# Patient Record
Sex: MALE | Race: WHITE
[De-identification: names, ages, dates, MRNs, and addresses within clinical notes are randomized per-mention and may not be internally consistent; named-entity substitution may affect disease eponyms.]

---

## 2017-05-09 ENCOUNTER — HOSPITAL ENCOUNTER (OUTPATIENT)
Dept: HOSPITAL 57 - HPCALD | Age: 77
Discharge: HOME | End: 2017-05-09
Attending: FAMILY MEDICINE
Payer: MEDICARE

## 2017-05-09 DIAGNOSIS — R31.0: Primary | ICD-10-CM

## 2017-05-09 LAB
ANION GAP SERPL CALC-SCNC: 12 MMOL/L (ref 10–20)
BASOPHILS # BLD AUTO: 0.1 THOU/UL (ref 0–0.2)
BASOPHILS NFR BLD AUTO: 0.7 % (ref 0–1)
BUN SERPL-MCNC: 20 MG/DL (ref 8.4–25.7)
CALCIUM SERPL-MCNC: 8.8 MG/DL (ref 7.8–10.44)
CHLORIDE SERPL-SCNC: 110 MMOL/L (ref 98–107)
CO2 SERPL-SCNC: 26 MMOL/L (ref 23–31)
CREAT CL PREDICTED SERPL C-G-VRATE: 0 ML/MIN (ref 70–130)
EOSINOPHIL # BLD AUTO: 0 THOU/UL (ref 0–0.7)
EOSINOPHIL NFR BLD AUTO: 0.5 % (ref 0–10)
GLUCOSE SERPL-MCNC: 92 MG/DL (ref 83–110)
HGB BLD-MCNC: 15.6 G/DL (ref 14–18)
LYMPHOCYTES # BLD AUTO: 1.5 THOU/UL (ref 1.2–3.4)
LYMPHOCYTES NFR BLD AUTO: 20 % (ref 21–51)
MCH RBC QN AUTO: 30.4 PG (ref 27–31)
MCV RBC AUTO: 94.7 FL (ref 80–94)
MONOCYTES # BLD AUTO: 0.7 THOU/UL (ref 0.11–0.59)
MONOCYTES NFR BLD AUTO: 9.3 % (ref 0–10)
NEUTROPHILS # BLD AUTO: 5.2 THOU/UL (ref 1.4–6.5)
NEUTROPHILS NFR BLD AUTO: 69.4 % (ref 42–75)
PLATELET # BLD AUTO: 194 THOU/UL (ref 130–400)
POTASSIUM SERPL-SCNC: 4.4 MMOL/L (ref 3.5–5.1)
RBC # BLD AUTO: 5.16 MILL/UL (ref 4.7–6.1)
SODIUM SERPL-SCNC: 144 MMOL/L (ref 136–145)
WBC # BLD AUTO: 7.5 THOU/UL (ref 4.8–10.8)

## 2017-05-09 PROCEDURE — 80048 BASIC METABOLIC PNL TOTAL CA: CPT

## 2017-05-09 PROCEDURE — 85025 COMPLETE CBC W/AUTO DIFF WBC: CPT

## 2017-05-09 PROCEDURE — 36415 COLL VENOUS BLD VENIPUNCTURE: CPT

## 2017-05-09 PROCEDURE — 87086 URINE CULTURE/COLONY COUNT: CPT

## 2017-05-14 ENCOUNTER — HOSPITAL ENCOUNTER (EMERGENCY)
Dept: HOSPITAL 57 - BURERS | Age: 77
LOS: 1 days | Discharge: HOME | End: 2017-05-15
Payer: MEDICARE

## 2017-05-14 DIAGNOSIS — N13.2: Primary | ICD-10-CM

## 2017-05-14 DIAGNOSIS — Z79.82: ICD-10-CM

## 2017-05-14 DIAGNOSIS — Z79.899: ICD-10-CM

## 2017-05-14 DIAGNOSIS — I10: ICD-10-CM

## 2017-05-14 LAB
BACTERIA UR QL AUTO: (no result) HPF
RBC UR QL AUTO: (no result) HPF (ref 0–3)
SP GR UR STRIP: 1.02 (ref 1–1.03)
WBC UR QL AUTO: (no result) HPF (ref 0–3)

## 2017-05-14 PROCEDURE — 74176 CT ABD & PELVIS W/O CONTRAST: CPT

## 2017-05-14 PROCEDURE — 96372 THER/PROPH/DIAG INJ SC/IM: CPT

## 2017-05-14 PROCEDURE — 87086 URINE CULTURE/COLONY COUNT: CPT

## 2017-05-14 PROCEDURE — 81003 URINALYSIS AUTO W/O SCOPE: CPT

## 2017-05-14 PROCEDURE — 81015 MICROSCOPIC EXAM OF URINE: CPT

## 2017-05-15 NOTE — CT
PRELIMINARY REPORT/[facility]/EMERGENCY AFTER

HOURS PROCEDURE:

 

EXAM:

CT Abdomen and Pelvis Without Intravenous Contrast

 

CLINICAL HISTORY:

76 years old, male; Pain; Abdominal pain; Flank; Right lower quadrant (rlq); Patient HX: Pt having s
harp right flank pain started today.

 

TECHNIQUE:

Axial computed tomography images of the abdomen and pelvis without intravenous contrast. This CT exa
m was performed using one or more of the following dose reduction techniques: automated exposure con
trol, adjustment of the mA and/or kV according to patient size, and/or use of iterative

reconstruction technique.

Coronal reformatted images were created and reviewed.

 

EXAM DATE/TIME:

5/14/2017 10:34 PM

 

COMPARISON:

No relevant prior studies available.

 

FINDINGS:

Limitations: Masses and lesions in the solid organs including traumatic injury and vascular and infe
ctious pathology can be missed without intravenous contrast.

Lower thorax: Right lower lobe 9 mm pulmonary nodule. Small hiatal hernia

 

ABDOMEN:

Liver: unremarkable for age

Gallbladder and bile ducts: No gallbladder distention. No CBD dilation.

Pancreas: unremarkable

Spleen: unremarkable

Adrenals: unremarkable

Kidneys and ureters: Right nephrolithiasis. Mild right hydronephrosis from right UVJ 2 mm stone.

Left renal cortical and parapelvic cysts.

Stomach and bowel: No obstruction.

Appendix: Not seen

 

PELVIS:

Bladder: Urinary bladder decompressed

Reproductive: Slightly enlarged prostate with hypertrophy of the lobe of Tom.

 

ABDOMEN and PELVIS:

Intraperitoneal space: No ascites. No pneumoperitoneum.

Bones/joints: Multiple osteosclerotic foci

Soft tissues: Fat enters umbilicus.

Vasculature: No abdominal aortic aneurysm.

Lymph nodes: Retroperitoneal and pelvic lymphadenopathy most pronounced left iliac chain

 

IMPRESSION:

- Right nephrolithiasis. Mild right hydronephrosis from right UVJ 2 mm stone.

- Adenopathy and osteosclerosis worrisome for metastatic disease.

- Right lower lobe 9 mm pulmonary nodule.

 

Thank you for allowing us to participate in the care of your patient.

 

Dictated and Authenticated by: Alexander Gomez MD

05/14/2017 11:03 PM Central Time ([place] \T\ [place])

 

 

FINAL REPORT

CT ABDOMEN AND PELVIS WITHOUT CONTRAST:

 

Date:  05/14/17 

 

Comparison made with the 09/23/16 CT of the chest. 

 

As noted previously, there is a small nodule in the right lower lobe. It seems slightly larger today
 and measures about 10.0 mm in size. The fluid collection seen previously along the right posterolat
eral hemithorax has resolved. There are no effusions. 

 

Right hydronephrosis is present secondary to a small distal right ureteral calculus measuring about 
5.0 mm in size near the right UVJ. There is at least one tiny calculus remaining in the right kidney
. The left kidney shows some cysts and parapelvic cysts. Without IV contrast, it would be difficult 
to see any solid masses. 

 

The liver, spleen, pancreas, and gallbladder show no acute findings. A tiny right adrenal mass seen 
on the 2016 study is difficult to assess well in this noncontrast study, but the left adrenal seems 
to be slightly enlarged. The aorta is normal in caliber. 

 

Of considerable concern on this study is abundance of paraaortic and pericaval adenopathy that jazmyn
nues on into the iliac chains bilaterally. In addition, there are several lumbar vertebra that have 
sclerotic areas within it that are suspicious for metastatic disease. There is considerable degenera
tive change throughout the spine otherwise. 

 

CT of the pelvis was remarkable for some of the iliac chain adenopathy, some nodes of which are jaden
uring up to 3.0 cm in size. Otherwise, no pelvic masses were seen. The patient's prostate seems rath
er generous in size and not entirely uniform. It would be worth being checked. 

 

IMPRESSION: 

1.  5.0 mm distal right ureteral calculus causing mild right hydronephrosis. 

 

2.  Tiny, nonobstructing calculus in the right kidney. Multiple cystic areas seen in the left kidney
. 

 

3.  Abundance of paraaortic and pericaval adenopathy, as well as iliac chain adenopathy. This, taken
 in conjunction with sclerotic areas seen in the lumbar spine, raise a significant question of metas
tatic disease. The prostate, in particular, would be worth checking. 

 

4.  10.0 mm right lower lobe nodule which seems slightly larger than on the 2016 CT of the chest. 

 

Findings in agreement with preliminary reading by Trip. 

 

 

POS: HOME

## 2018-02-25 ENCOUNTER — HOSPITAL ENCOUNTER (EMERGENCY)
Dept: HOSPITAL 57 - BURERS | Age: 78
Discharge: HOME | End: 2018-02-25
Payer: MEDICARE

## 2018-02-25 DIAGNOSIS — Z79.82: ICD-10-CM

## 2018-02-25 DIAGNOSIS — Z79.899: ICD-10-CM

## 2018-02-25 DIAGNOSIS — D49.2: ICD-10-CM

## 2018-02-25 DIAGNOSIS — G89.29: ICD-10-CM

## 2018-02-25 DIAGNOSIS — C41.4: ICD-10-CM

## 2018-02-25 DIAGNOSIS — M54.5: Primary | ICD-10-CM

## 2018-02-25 DIAGNOSIS — I10: ICD-10-CM

## 2018-02-25 LAB
BACTERIA UR QL AUTO: (no result) HPF
HYALINE CASTS #/AREA URNS LPF: (no result) LPF
RBC UR QL AUTO: (no result) HPF (ref 0–3)
SP GR UR STRIP: 1.02 (ref 1–1.03)
WBC UR QL AUTO: (no result) HPF (ref 0–3)

## 2018-02-25 PROCEDURE — 81015 MICROSCOPIC EXAM OF URINE: CPT

## 2018-02-25 PROCEDURE — 74176 CT ABD & PELVIS W/O CONTRAST: CPT

## 2018-02-25 PROCEDURE — 81003 URINALYSIS AUTO W/O SCOPE: CPT

## 2018-02-25 NOTE — CT
CT ABDOMEN AND PELVIS WITHOUT CONTRAST:

 

Date:  02/25/18 

 

Comparison made with the 05/14/17 study. 

 

Axial slices were acquired, then coronal and sagittal reconstructions were done. No oral or IV contra
st was used. 

 

FINDINGS:

No infiltrates or effusions seen in the lung bases. There are several small pulmonary nodules seen bi
laterally in all lobes. The largest is in the right lower lobe which is 7.0 mm in size. It is actuall
y a little smaller than before. 

 

The liver, spleen, pancreas, adrenal glands, and aorta show no acute findings. A hiatal hernia is pre
sent. 

 

There is no obstruction on the right today or renal calculi. There is moderate left hydronephrosis an
d hydroureter all the way down to the urinary bladder. Inside the bladder, there appears to be a mass
 near the left UVJ internally that may be originating from the prostate. In addition to these finding
s, there is extensive paraaortic and pericaval adenopathy, as well as iliac adenopathy bilaterally. T
hese findings were present before, but have advanced somewhat. The other change between now and then 
is presence of many more sclerotic areas in the spine and the pelvis. Findings suggest diffuse metast
atic disease, probably of a prostatic origin given the sclerotic nature of the findings. There is als
o moderately severe spinal stenosis at L4-L5 due to facet and ligamentous hypertrophy and a diffuse b
ulging disc. 

 

The bowel is nondistended and shows no inflammatory change around it. No free air or free fluid seen.
 A minimal fat-filled umbilical hernia was present of no concern. There is a little bit of adenopathy
 in the mesentery. 

 

CT of the pelvis was remarkable for the findings already listed above. There were no inflammatory jacob
nges or fluid. The prostate is enlarged and irregular. 

 

IMPRESSION: 

1.  Left hydronephrosis and hydroureter that appears to be due to a mass within the urinary bladder, 
probably of prostatic origin. No evidence of right-sided obstruction. 

 

2.  Multiple sclerotic areas in the spine and pelvis consistent with metastatic disease. These have i
ncreased in size and number since 2017. 

 

3.  Extensive paraaortic and pericaval adenopathy, in addition to iliac adenopathy. Roughly similar t
o before, but probably slightly more nevertheless. 

 

4.  Spinal stenosis at L4-L5. 

 

Findings discussed with Dr. Augustin at 1942 hours on 02/25/18. 

CODE CR. 

 

 

POS: HOME

## 2018-04-07 ENCOUNTER — HOSPITAL ENCOUNTER (EMERGENCY)
Dept: HOSPITAL 92 - ERS | Age: 78
Discharge: HOME | End: 2018-04-07
Payer: MEDICARE

## 2018-04-07 PROCEDURE — 96374 THER/PROPH/DIAG INJ IV PUSH: CPT

## 2018-04-07 PROCEDURE — 96375 TX/PRO/DX INJ NEW DRUG ADDON: CPT

## 2018-04-07 PROCEDURE — 93970 EXTREMITY STUDY: CPT

## 2018-04-07 NOTE — RAD
RADIOGRAPH LEFT LEG TIBIA AND FIBULA 2 VIEWS:

 

Date:  04/07/18 

 

HISTORY:  

77-year-old male with bilateral leg pain for 1 month. 

 

FINDINGS:

Severe DJD at medial compartment of left knee. Moderate DJD at patellofemoral compartment and lateral
 compartment. Diffuse osteopenia. No periosteal elevation, osteolytic lesion, osteoblastic lesion, or
 permeative lesion, or fracture involving the tibia and fibula. 

 

IMPRESSION: 

1.  Severe osteoarthrosis of the left knee. 

2.  Osteopenia. 

3.  No destructive osseous lesion. 

 

 

POS: [facility]

## 2018-04-07 NOTE — RAD
RADIOGRAPH RIGHT LEG TIBIA AND FIBULA 2 VIEWS:

 

Date:  04/07/18 

 

HISTORY:  

77-year-old male with nontraumatic bilateral leg pain for 1 month. 

 

FINDINGS:

Diffuse osteopenia. No periosteal elevation, permeative lesion, osteoblastic lesion, or osteolytic le
herber. No fracture. DJD at knee, with moderate to severe degenerative changes at the medial compartmen
t and patellofemoral compartment. 

 

IMPRESSION: 

1.  Osteopenia. 

2.  No fracture or destructive osseous lesion. 

3.  Osteoarthrosis of the knee, moderate to severe. 

 

 

POS: [facility]

## 2018-04-07 NOTE — ULT
ULTRASOUND WITH DOPPLER DUPLEX VENOUS LOWER EXTREMITIES BILATERAL:

 

HISTORY: 

77-year-old male with bilateral lower extremity pain. 

 

TECHNIQUE:

Color flow Doppler, spectral waveform analysis of pulsed Doppler, and gray-scale imaging with ronn
herber and augmentation, were used to evaluate the bilateral common femoral, femoral, popliteal, poster
ior tibial, and superficial femoral, veins; and the proximal portions of the profunda femoral and gre
ater saphenous, veins.

 

FINDINGS:

There is normal compressibility, demonstration of blood flow by color Doppler and pulsed Doppler, and
 response to augmentation, in all interrogated veins. Sluggish flow noted by the sonographer during s
levon. 

 

IMPRESSION:

1.  No deep vein thrombosis in the bilateral lower extremities.

2.  Sluggish flow throughout the deep veins of bilateral lower extremities. 

 

 

vonda[] 

 

POS: [facility]

## 2018-07-16 ENCOUNTER — HOSPITAL ENCOUNTER (EMERGENCY)
Dept: HOSPITAL 57 - BURERS | Age: 78
LOS: 1 days | Discharge: ANOTHER HOSPITAL | End: 2018-07-17
Payer: MEDICARE

## 2018-07-16 DIAGNOSIS — Z79.82: ICD-10-CM

## 2018-07-16 DIAGNOSIS — E78.5: ICD-10-CM

## 2018-07-16 DIAGNOSIS — I10: ICD-10-CM

## 2018-07-16 DIAGNOSIS — Z79.899: ICD-10-CM

## 2018-07-16 DIAGNOSIS — G95.20: Primary | ICD-10-CM

## 2018-07-16 LAB
ALBUMIN SERPL BCG-MCNC: 3.7 G/DL (ref 3.4–4.8)
ALP SERPL-CCNC: 286 U/L (ref 40–150)
ALT SERPL W P-5'-P-CCNC: 8 U/L (ref 8–55)
ANION GAP SERPL CALC-SCNC: 19 MMOL/L (ref 10–20)
ANISOCYTOSIS BLD QL SMEAR: (no result) (100X)
APAP SERPL-MCNC: (no result) MCG/ML (ref 10–30)
AST SERPL-CCNC: 11 U/L (ref 5–34)
BILIRUB SERPL-MCNC: 0.4 MG/DL (ref 0.2–1.2)
BUN SERPL-MCNC: 33 MG/DL (ref 8.4–25.7)
CALCIUM SERPL-MCNC: 8.9 MG/DL (ref 7.8–10.44)
CHLORIDE SERPL-SCNC: 108 MMOL/L (ref 98–107)
CK MB SERPL-MCNC: 1.1 NG/ML (ref 0–6.6)
CK SERPL-CCNC: 47 U/L (ref 30–200)
CO2 SERPL-SCNC: 19 MMOL/L (ref 23–31)
CREAT CL PREDICTED SERPL C-G-VRATE: 0 ML/MIN (ref 70–130)
GLOBULIN SER CALC-MCNC: 3.5 G/DL (ref 2.4–3.5)
GLUCOSE SERPL-MCNC: 159 MG/DL (ref 83–110)
HGB BLD-MCNC: 13.5 G/DL (ref 14–18)
LIPASE SERPL-CCNC: 26 U/L (ref 8–78)
MCH RBC QN AUTO: 30 PG (ref 27–31)
MCV RBC AUTO: 85.9 FL (ref 78–98)
MDIFF COMPLETE?: YES
PLATELET # BLD AUTO: 280 THOU/UL (ref 130–400)
PLATELET BLD QL SMEAR: (no result)
POTASSIUM SERPL-SCNC: 4.6 MMOL/L (ref 3.5–5.1)
RBC # BLD AUTO: 4.51 MILL/UL (ref 4.7–6.1)
SALICYLATES SERPL-MCNC: (no result) MG/DL (ref 15–30)
SODIUM SERPL-SCNC: 141 MMOL/L (ref 136–145)
TROPONIN I SERPL DL<=0.01 NG/ML-MCNC: 0.02 NG/ML (ref ?–0.03)
WBC # BLD AUTO: 10.5 THOU/UL (ref 4.8–10.8)

## 2018-07-16 PROCEDURE — 96361 HYDRATE IV INFUSION ADD-ON: CPT

## 2018-07-16 PROCEDURE — 84484 ASSAY OF TROPONIN QUANT: CPT

## 2018-07-16 PROCEDURE — 82553 CREATINE MB FRACTION: CPT

## 2018-07-16 PROCEDURE — 96374 THER/PROPH/DIAG INJ IV PUSH: CPT

## 2018-07-16 PROCEDURE — 71275 CT ANGIOGRAPHY CHEST: CPT

## 2018-07-16 PROCEDURE — 83605 ASSAY OF LACTIC ACID: CPT

## 2018-07-16 PROCEDURE — 93005 ELECTROCARDIOGRAM TRACING: CPT

## 2018-07-16 PROCEDURE — 83690 ASSAY OF LIPASE: CPT

## 2018-07-16 PROCEDURE — 82550 ASSAY OF CK (CPK): CPT

## 2018-07-16 PROCEDURE — 85379 FIBRIN DEGRADATION QUANT: CPT

## 2018-07-16 PROCEDURE — 80307 DRUG TEST PRSMV CHEM ANLYZR: CPT

## 2018-07-16 PROCEDURE — 85025 COMPLETE CBC W/AUTO DIFF WBC: CPT

## 2018-07-16 PROCEDURE — 96375 TX/PRO/DX INJ NEW DRUG ADDON: CPT

## 2018-07-16 PROCEDURE — 80053 COMPREHEN METABOLIC PANEL: CPT

## 2018-07-16 PROCEDURE — 96376 TX/PRO/DX INJ SAME DRUG ADON: CPT

## 2018-07-17 ENCOUNTER — HOSPITAL ENCOUNTER (OUTPATIENT)
Dept: HOSPITAL 92 - ERS | Age: 78
Setting detail: OBSERVATION
LOS: 3 days | Discharge: HOME | End: 2018-07-20
Attending: INTERNAL MEDICINE | Admitting: INTERNAL MEDICINE
Payer: MEDICARE

## 2018-07-17 VITALS — BODY MASS INDEX: 30.1 KG/M2

## 2018-07-17 DIAGNOSIS — N13.1: ICD-10-CM

## 2018-07-17 DIAGNOSIS — N17.9: Primary | ICD-10-CM

## 2018-07-17 DIAGNOSIS — Z91.19: ICD-10-CM

## 2018-07-17 DIAGNOSIS — R41.0: ICD-10-CM

## 2018-07-17 DIAGNOSIS — C61: ICD-10-CM

## 2018-07-17 DIAGNOSIS — R53.83: ICD-10-CM

## 2018-07-17 DIAGNOSIS — Z91.14: ICD-10-CM

## 2018-07-17 DIAGNOSIS — Z79.899: ICD-10-CM

## 2018-07-17 DIAGNOSIS — N32.89: ICD-10-CM

## 2018-07-17 DIAGNOSIS — G62.9: ICD-10-CM

## 2018-07-17 DIAGNOSIS — R33.8: ICD-10-CM

## 2018-07-17 DIAGNOSIS — E87.2: ICD-10-CM

## 2018-07-17 DIAGNOSIS — E78.5: ICD-10-CM

## 2018-07-17 DIAGNOSIS — T43.015A: ICD-10-CM

## 2018-07-17 DIAGNOSIS — Z87.442: ICD-10-CM

## 2018-07-17 DIAGNOSIS — C79.89: ICD-10-CM

## 2018-07-17 DIAGNOSIS — I10: ICD-10-CM

## 2018-07-17 DIAGNOSIS — M48.061: ICD-10-CM

## 2018-07-17 DIAGNOSIS — Z79.82: ICD-10-CM

## 2018-07-17 LAB
ALBUMIN SERPL BCG-MCNC: 3.7 G/DL (ref 3.4–4.8)
ALP SERPL-CCNC: 294 U/L (ref 40–150)
ALT SERPL W P-5'-P-CCNC: 9 U/L (ref 8–55)
ANION GAP SERPL CALC-SCNC: 14 MMOL/L (ref 10–20)
AST SERPL-CCNC: 15 U/L (ref 5–34)
BASOPHILS # BLD AUTO: 0 THOU/UL (ref 0–0.2)
BASOPHILS NFR BLD AUTO: 0 % (ref 0–1)
BILIRUB SERPL-MCNC: 0.3 MG/DL (ref 0.2–1.2)
BUN SERPL-MCNC: 30 MG/DL (ref 8.4–25.7)
CALCIUM SERPL-MCNC: 8.7 MG/DL (ref 7.8–10.44)
CHLORIDE SERPL-SCNC: 110 MMOL/L (ref 98–107)
CO2 SERPL-SCNC: 19 MMOL/L (ref 23–31)
CREAT CL PREDICTED SERPL C-G-VRATE: 0 ML/MIN (ref 70–130)
CRYSTAL-AUWI FLAG: 0 (ref 0–15)
CRYSTAL-AUWI FLAG: 0 (ref 0–15)
EOSINOPHIL # BLD AUTO: 0 THOU/UL (ref 0–0.7)
EOSINOPHIL NFR BLD AUTO: 0.4 % (ref 0–10)
GLOBULIN SER CALC-MCNC: 3 G/DL (ref 2.4–3.5)
GLUCOSE SERPL-MCNC: 184 MG/DL (ref 83–110)
HEV IGM SER QL: 2.4 (ref 0–7.99)
HEV IGM SER QL: 4.5 (ref 0–7.99)
HGB BLD-MCNC: 13.4 G/DL (ref 14–18)
HYALINE CASTS #/AREA URNS LPF: (no result) LPF
HYALINE CASTS #/AREA URNS LPF: (no result) LPF
LYMPHOCYTES # BLD: 0.9 THOU/UL (ref 1.2–3.4)
LYMPHOCYTES NFR BLD AUTO: 11.4 % (ref 21–51)
MCH RBC QN AUTO: 31.5 PG (ref 27–31)
MCV RBC AUTO: 94.4 FL (ref 78–98)
MONOCYTES # BLD AUTO: 0.2 THOU/UL (ref 0.11–0.59)
MONOCYTES NFR BLD AUTO: 2 % (ref 0–10)
NEUTROPHILS # BLD AUTO: 6.9 THOU/UL (ref 1.4–6.5)
NEUTROPHILS NFR BLD AUTO: 86.2 % (ref 42–75)
PATHC CAST-AUWI FLAG: 0 (ref 0–2.49)
PATHC CAST-AUWI FLAG: 1.3 (ref 0–2.49)
PLATELET # BLD AUTO: 250 THOU/UL (ref 130–400)
POTASSIUM SERPL-SCNC: 5 MMOL/L (ref 3.5–5.1)
RBC # BLD AUTO: 4.26 MILL/UL (ref 4.7–6.1)
RBC UR QL AUTO: (no result) HPF (ref 0–3)
RBC UR QL AUTO: (no result) HPF (ref 0–3)
SODIUM SERPL-SCNC: 138 MMOL/L (ref 136–145)
SP GR UR STRIP: 1.02 (ref 1–1.04)
SP GR UR STRIP: 1.03 (ref 1–1.04)
SPERM-AUWI FLAG: 0 (ref 0–9.9)
SPERM-AUWI FLAG: 0 (ref 0–9.9)
WBC # BLD AUTO: 8 THOU/UL (ref 4.8–10.8)
WBC UR QL AUTO: (no result) HPF (ref 0–3)
WBC UR QL AUTO: (no result) HPF (ref 0–3)
YEAST-AUWI FLAG: 0 (ref 0–25)
YEAST-AUWI FLAG: 0 (ref 0–25)

## 2018-07-17 PROCEDURE — 85025 COMPLETE CBC W/AUTO DIFF WBC: CPT

## 2018-07-17 PROCEDURE — 51798 US URINE CAPACITY MEASURE: CPT

## 2018-07-17 PROCEDURE — G0378 HOSPITAL OBSERVATION PER HR: HCPCS

## 2018-07-17 PROCEDURE — 81015 MICROSCOPIC EXAM OF URINE: CPT

## 2018-07-17 PROCEDURE — 97530 THERAPEUTIC ACTIVITIES: CPT

## 2018-07-17 PROCEDURE — 80053 COMPREHEN METABOLIC PANEL: CPT

## 2018-07-17 PROCEDURE — 99285 EMERGENCY DEPT VISIT HI MDM: CPT

## 2018-07-17 PROCEDURE — 84153 ASSAY OF PSA TOTAL: CPT

## 2018-07-17 PROCEDURE — 97535 SELF CARE MNGMENT TRAINING: CPT

## 2018-07-17 PROCEDURE — 81003 URINALYSIS AUTO W/O SCOPE: CPT

## 2018-07-17 PROCEDURE — 87086 URINE CULTURE/COLONY COUNT: CPT

## 2018-07-17 PROCEDURE — 97139 UNLISTED THERAPEUTIC PX: CPT

## 2018-07-17 PROCEDURE — 36415 COLL VENOUS BLD VENIPUNCTURE: CPT

## 2018-07-17 PROCEDURE — 72158 MRI LUMBAR SPINE W/O & W/DYE: CPT

## 2018-07-17 PROCEDURE — 96374 THER/PROPH/DIAG INJ IV PUSH: CPT

## 2018-07-17 PROCEDURE — 96376 TX/PRO/DX INJ SAME DRUG ADON: CPT

## 2018-07-17 PROCEDURE — 81001 URINALYSIS AUTO W/SCOPE: CPT

## 2018-07-17 PROCEDURE — 96375 TX/PRO/DX INJ NEW DRUG ADDON: CPT

## 2018-07-17 PROCEDURE — 97116 GAIT TRAINING THERAPY: CPT

## 2018-07-17 PROCEDURE — A9579 GAD-BASE MR CONTRAST NOS,1ML: HCPCS

## 2018-07-17 PROCEDURE — 96361 HYDRATE IV INFUSION ADD-ON: CPT

## 2018-07-17 PROCEDURE — 80048 BASIC METABOLIC PNL TOTAL CA: CPT

## 2018-07-17 PROCEDURE — 93970 EXTREMITY STUDY: CPT

## 2018-07-17 PROCEDURE — A4216 STERILE WATER/SALINE, 10 ML: HCPCS

## 2018-07-17 RX ADMIN — Medication SCH: at 21:37

## 2018-07-17 NOTE — MRI
PRELIMINARY REPORT/[facility]/EMERGENTY AFTER-HOURS PROCEDURE 

 

MR Lumbar Spine Without And With Intravenous Contrast

 

CLINICAL HISTORY:

78 years old, male; Pain; Low back pain; Patient HX: MJennifer presents to ed as tfr from [place] for bila
teral leg pain. Pt reports pain in legs that began february and has gotten progressively worse. Pt re
ports the pain today was "excruciating, worst ever. " pt reports pain is from the knees down and is

not associated to his previous knee pain, feels different. Pt denies changes in bowel movements, weak
ness in knees. Pt reports decreased urination since february with medication chance. Hx possible canc
er (dr. Kulkarni, dr. Dye a few months ago, possible prostate cancer, has not followed up).

 

TECHNIQUE:

Magnetic resonance images of the lumbar spine without and with intravenous contrast in multiple plane
s.

 

CONTRAST:

8 mL of MULTIHANCE administered intravenously.

 

COMPARISON:

No relevant prior studies available.

 

FINDINGS:

Vertebrae: There is nonspecific signal abnormality of the L5 vertebral body and enhancing foci within
 the posterior elements on the RIGHT for which bone metastasis and/or pathological fracture are possi
ble.

Other bones/joints: There is abnormal enhancement within the RIGHT sacrum possibly representing neopl
astic infiltration.

Spinal cord: Normal. Normal signal. No abnormal enhancement.

Soft tissues: Normal.

Lymph nodes: Innumerable round masses anterior to the spine suspicious for neoplastic lymphadenopathy
.

 

DISCS/SPINAL CANAL/NEURAL FORAMINA:

L1-L2: Normal. No significant disc disease. No stenosis.

L2-L3: Normal. No significant disc disease. No stenosis.

L3-L4: Normal. No significant disc disease. No stenosis.

L4-L5: There is broad-based disc bulge at L4-L5 resulting in moderate to marked spinal canal stenosis
.

L5-S1: Normal. No significant disc disease. No stenosis.

 

IMPRESSION:

1. There is signal abnormality of the L5 vertebral body and enhancing foci within the posterior eleme
nts on the RIGHT suspicious for pathological fracture from bone metastasis. CT lumbar spine may be he
lpful for further evaluation.

2. There is abnormal enhancement within the RIGHT sacrum possibly representing neoplastic infiltratio
n.

3. Innumerable round masses anterior to the spine suggestive of marked lymphadenopathy.

 

Thank you for allowing us to participate in the care of your patient.

Dictated and Authenticated by: Andrew Horta MD

07/17/2018 5:07 AM Central Time ([place] & [place])

 

 

FINAL REPORT 

 

MRI LUMBAR SPINE WITH AND WITHOUT CONTRAST:

 

HISTORY: 

History of possible prostate cancer.  Back pain.

 

FINDINGS: 

There is abnormal T1 signal throughout the visualized vertebrae including lumbar spine and sacrum.  D
iffuse low T1 signal throughout all visualized osseous structures.  This is nonspecific but indicates
 replacement of fatty marrow.  Findings could represent diffuse neoplastic infiltration or anemia.

 

There are areas of abnormal enhancement in the L5 vertebra and in the sacrum consistent with neoplast
ic involvement as noted on the preliminary report.  Retroperitoneal adenopathy is noted as described 
on the preliminary report.

 

I am in agreement with the preliminary report.

 

POS: [facility]

## 2018-07-17 NOTE — HP
DATE OF ADMISSION:  2018

 

REASON FOR ADMISSION:  Acute kidney injury, left hydronephrosis with 
obstructive uropathy, prostate cancer with multiple mets, bilateral lower 
extremity weakness and pain, severe spinal stenosis at the L3-L4, metabolic 
acidosis.

 

HISTORY OF PRESENTING ILLNESS:  The patient initially came to ER with 
complaints of lower extremity severe burning pain with weakness, which has been 
worse from last 2 days.  He has lower extremity pain from February on, but this 
became very severe to the point that he could not ambulate.  He got excellent 
relief with morphine after arriving in the ER.  The patient also has a history 
of prostate cancer, which has been slowly metastasizing from  on.  No 
complaints of shortness of breath, but he is on oxygen due to him receiving 
morphine with resultant mild respiratory depression from it.  No complaints of 
chest pain or palpitation.  No complaints of fever.  Has trouble with urinary 
stream and also has associated dribbling after urination.  The patient states 
he has seen Dr. Dye for his prostate cancer once in February and has not 
followed up as the treatment was very expensive for him.  He has never seen a 
urologist so far.

 

PAST MEDICAL AND SURGICAL HISTORY:  History of metastatic prostate cancer, 
hypertension, dyslipidemia, appendectomy, history of kidney stones in the past 
with no procedures done towards the same.

 

CURRENT MEDICATIONS:  Norvasc 10 mg daily, losartan 100 mg daily, Ultram p.r.n. 
for pain, Colace 100 mg twice daily, aspirin 81 mg daily.

 

ALLERGIES:  No known drug allergies.

 

PERSONAL HISTORY:  Does not abuse alcohol or drugs.  No history of smoking.  
Lives with his wife.  Ambulates with a cane.

 

FAMILY HISTORY:  Mother  at the age of 89 years from natural causes.  
Father  at the age of 79 years.  He has had a history of CVA.

 

REVIEW OF SYSTEMS:  The following complete review of systems was negative, 
unless otherwise mentioned in the HPI or below:  Constitutional:  Weight loss 
or gain, ability to conduct usual activities.  Skin:  Rash, itching.  Eyes:  
Double vision, pain.  ENT/Mouth:  Nose bleeding, neck stiffness, pain, 
tenderness.  Cardiovascular:  Palpitations, dyspnea on exertion, orthopnea.  
Respiratory:  Shortness of breath, wheezing, cough, hemoptysis, fever or night 
sweats.  Gastrointestinal:  Poor appetite, abdominal pain, heartburn, nausea, 
vomiting, constipation or diarrhea.  Genitourinary:  Urgency, frequency, dysuria
, nocturia.  Musculoskeletal:  Pain, swelling.  Neurologic/Psychiatric:  Anxiety
, depression.  Allergy/Immunologic:  Skin rash, bleeding tendency.

 

CODE STATUS:  Full.  Power of  is his wife.

 

PHYSICAL EXAMINATION:

GENERAL:  The patient is a 78-year-old male who is currently not in any acute 
distress.  His lower extremity pain is resolved with morphine, which he 
received just a while ago.

VITAL SIGNS:  Blood pressure 144/94, pulse 110 per minute, respiratory rate 18 
per minute, temperature 98.2 degrees Fahrenheit, saturating 94% on 3 liters 
oxygen.

NECK:  Supple.  No elevated JVD.

HEENT:  Extraocular muscles intact.  Pupils reacting to light.  Oral cavity 
mucous membranes are moist.

CARDIOVASCULAR:  S1, S2 heard.  Regular rhythm.

RESPIRATORY:  Air entry 1+ bilateral.  Scattered rhonchi plus.  No wheezes.

ABDOMEN:  Soft.  Bowel sounds heard.  No tenderness, rigidity or guarding.

EXTREMITIES:  There is bilateral edema in the lower extremities.  No calf 
tenderness.

VASCULAR:  Peripheral pulses are 1+ bilateral.  No ischemic ulcerations or 
gangrene.

CENTRAL NERVOUS SYSTEM:  No gross focal deficits noted.  The patient is alert, 
awake, oriented well.

PSYCHIATRIC:  The patient's mood is euthymic.  No hallucinations or delusions.

 

LABORATORY DATA:  Had CT aortic dissection protocol done, which showed no 
evidence of dissection or aneurysm.  There is evidence of diffuse metastatic 
disease including pulmonary nodules, changes in the bones and adenopathy.  
There were enlarged nodes seen in the paraaortic, pericaval and iliac chains, 
particularly the left iliac region.  There were also mesenteric nodes seen as 
well as mediastinal nodes.  Severe spinal stenosis at L3-L4.  Severe left 
hydronephrosis due to mass in the urinary bladder that most likely is of 
prostatic origin.  MRI lumbar spine done showed findings suggestive of possible 
diffuse neoplastic infiltration.  There is also abnormal enhancement in the L5 
vertebra and in the sacrum consistent with neoplastic involvement.  There is an 
incidental finding of retroperitoneal adenopathy.  White count of 10, H and H 
13 and 38, platelet count 280 with 48% neutrophils and 43% lymphocytes.  Serum 
bicarbonate 19, BUN 33, creatinine 1.7, glucose 159, lactic acid 3.8, alkaline 
phosphatase 286.  AST and ALT within normal limits.  Albumin is 3.7.  Serum 
calcium level is 8.9.  Albumin is 3.7.  First set of cardiac enzymes are 
negative.  Lipase is 26.

 

CLINICAL IMPRESSION AND PLAN:  The patient will be admitted to medical floor 
with acute kidney injury, left severe hydronephrosis due to obstructive 
uropathy from malignancy, metabolic acidosis, multiple metastases including 
spine, lower extremity pain and burning sensation.  He will be on normal saline 
at 80 mL per hour and continue Norvasc as before.  We will also place him on 
Flomax.  I have discussed his clinical findings and imaging studies with Dr. Dye and Dr. Edgar who will be consulting on patient.  Neurosurgery 
has already evaluated the patient on the floor.  He will be kept n.p.o. for 
possible urologic procedure for his severe hydronephrosis on the left with 
obstructive uropathy.  For pain, the patient will be on Ultram, lidocaine patch 
and Lyrica 75 mg twice daily.  The patient is wanting to have treatment for his 
metastatic prostate cancer now and we will follow up with Dr. Dye as well 
for the same.  He wants financial help with medications if it is too expensive.

 

MTDD

## 2018-07-17 NOTE — CON
DATE OF CONSULTATION:  07/17/2018

 

PRIMARY CARE PHYSICIAN:  Dr. Peres.

 

ONCOLOGIST:  Dr. Dye.

 

REFERRING:  Dr. Garcia from Hospitalist.

 

REASON FOR CONSULTATION:  Regarding history of prostate cancer.

 

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 
, who is followed by Dr. Peres in [place], seen by  Dr. Dye, 
regarding his prostate cancer.  He did not have a prior biopsy in the past; 
however, as he presented with grossly elevated PSA of 852, consistent with 
metastatic disease he was advised regarding Lupron, Zytiga, prednisone by Dr. Dye.  They have attempted to contact him on numerous occasions; however, 
has been noncompliant.  He was referred to my partner, Dr. Flaherty; however, 
failed to follow up for that appointment as well.  He does relate history of 
incontinence, urinary caliber variable.  Denies sensation of incomplete void, 
dysuria, gross hematuria, flank or abdominal pain.  The only reason he 
presented to the emergency room is bilateral lower extremity discomfort; lower 
extremity Doppler demonstrates no evidence of DVT.  He states he lives in a 
private residence with his wife.  He has been incontinent of urine.  Per my 
request, postvoid residue was obtained by nursing staff demonstrating 400 mL of 
post-void residual.  Admitting urinalysis demonstrates 0-3 rbc's, 4-6 wbc's.  
Culture was not obtained.  He does have a spontaneous passage of kidney stone 
in the remote past.  Denies current symptoms.  Much of the history is obtained 
per chart as well as his imaging, which dates back, which I reviewed myself.  
He does have history of hydronephrosis that was initially diagnosed in 02/2018, 
and recent CT scan demonstrates hydronephrosis that not significantly changed 
down to the level of the bladder consistent with local invasion of prostate 
cancer, obstructing his trigone.

 

PAST MEDICAL HISTORY:  Hyperlipidemia; hypertension; history of kidney stones 
with spontaneous passage; elevated PSA of 852, consistent with metastatic 
prostate cancer.

 

PAST SURGICAL HISTORY:  Appendectomy.

 

PSYCHIATRIC HISTORY:  Denies psychiatric history.

 

SOCIAL HISTORY:  Negative x3.

 

FAMILY HISTORY:  Unknown.

 

ALLERGIES:  No known drug allergies.

 

HOME MEDICATIONS:  Include amlodipine, losartan, tramadol, Colace, aspirin.

 

CURRENT MEDICATIONS:  Include acetaminophen; amlodipine; Pepcid; finasteride 
was started by Hospitalist on this admission; guaifenesin; morphine; Flomax 0.4 
mg one p.o. daily, which was started by the hospitalist on admission, he has 
received 1 dose so far; tramadol.

 

PHYSICAL EXAMINATION:

VITAL SIGNS:  Stable.  He is 97.8, 119, 18, 93, 158/81.  I's and O's are not 
strictly documented.

GENERAL:  The patient appears to be in no acute distress.  He is alert and 
oriented x3, answers questions appropriately, and provides his own clinical 
history.

HEENT:  Grossly unremarkable.

HEART:  Regular rate.

LUNGS:  Decreased inspiratory effort.

ABDOMEN:  Soft, protuberant, no CVA tenderness, no suprapubic tenderness.

GENITOURINARY EXAM:  Demonstrates circumcised phallus.  Meatus is grossly 
unremarkable.  Testes are descended with no evidence of intratesticular mass.

DIGITAL RECTAL EXAM:  Demonstrates grossly abnormal CHANTE with nodularity/
diffusely firm.  No gross palpable extension; however, it is consistent with 
locally invasive prostate cancer.

EXTREMITIES:  Lower extremity edema.  No calf tenderness is appreciated.

 

PERTINENT LABORATORIES AND IMAGING:  White count 8, hemoglobin 13, platelets 
250.  Baseline creatinine is 1.1 in March; 07/16, yesterday evening 1.74; with 
IV fluids, his creatinine is 1.46.  Urinalysis demonstrates trace leukocytes, 
no rbc's, 4-6 wbc's.  Culture was not obtained. 

  in 03/2018.  Alkaline phosphatase elevated at 294, consistent with 
bony metastatic prostate cancer.

 

CT scan, 05/2017, demonstrates right 2 mm ureterovesical junction stone with 
mild hydronephrosis.  Bladder is decompressed, slightly enlarged prostate on 
that CT with numerous periaortic-pericaval lymphadenopathy, iliac 
lymphadenopathy, sclerotic areas of the lumbar spine consistent with metastatic 
prostate cancer, right lower lobe lung nodule.

 

CT  02/2018, demonstrates right kidney is grossly unremarkable with no evidence 
of renal calculi;  left hydroureteronephrosis, unchanged from 02/2018.  There 
is extensive lymphadenopathy of the periaortic-pericaval iliac nodes, many 
sclerotic areas of the spine and pelvis consistent with diffuse metastatic 
disease.  Moderate L4-L5 spinal stenosis.

 

07/16/2018, multiple bilateral lung nodules consistent with widespread osseous 
metastatic disease.  Mediastinal and left hilar lymphadenopathy, no pulmonary 
embolism, severe left hydroureteronephrosis, unchanged from 02/2018.  Prostate 
is irregular with lobulated mass in the bladder extending from the prostate 
consistent with local invasion.  Extensive bilateral pelvic lymphadenopathy, 
measuring 6.8 x 2.8 cm, left inguinal lymphadenopathy.

 

Lower extremity Doppler, 04/07/2018, no evidence of DVT.

 

MRI, 07/2018, abnormal signal of the L5 vertebral body, suspicious for 
pathologic fracture from bony metastatic disease.  Marked lymphadenopathy.  



Due to incomplete void(voided approximately 150 mL of clear yellow urine)  
Subsequently, bladder scan demonstrates 400 mL.  I informed patient regarding 
indications for Andrew catheter.  He agreed.  A 16-[place]ian Andrew catheter passed 
without difficulty with clear urine yellow output of 400 mL of post-void 
residual return.  This was secured to gravity bag with stat locks.

 

IMPRESSION/PLAN:  Mr. Link is a 78-year-old male with history of hypertension, 
hyperlipidemia, remote history of kidney stone, presents for bilateral lower 
extremity discomfort, deep vein thrombosis has been ruled out.

1.  Labs and clinical history consistent with diffuse metastatic prostate 
cancer.  He has established with Dr. Dye. I did speak with Dr. Dye 
regarding his clinical history and care. He recommends patient follow-up with 
him upon discharge to initiate medical therapy as previously advise. I 
discussed with patient regarding importance of followup with Dr. Dye to 
proceed with palliative therapy for his metastatic prostate cancer.  I do 
prefer to perform prostate biopsy for tissue diagnosis, which he declines.  As 
his physical exam, clinical history labs indicates obvious prostate cancer, I 
agree with Dr. Dye regarding medical therapy. Compliance have been an 
issue. I offered tomorrow morning for transrectal ultrasound biopsy.  He 
appears to be more concerned about infection process than cancer itself.  A 
long discussion with the patient at bedside regarding importance of followup 
regarding his prostate cancer, as his prognosis is poor/grim given the diffuse 
metastatic disease.  He verbalizes understanding and states that he will follow 
up with Dr. Dye

.

2.  Bladder mass consistent with local invasion from prostate cancer.



3.  Left hydronephrosis, chronic in nature from invasive prostate cancer.  This 
is inapproachable from cystoscopic ureteral stent, as there is a mass in the 
trigone consistent with local invasion from the prostate cancer.  This is 
better addressed with left percutaneous nephrostomy tube.  However, given that 
he has had more than 5 months of hydronephrosis, permanent loss of left kidney 
is likely.  Nephrostomy tube was discussed, indications reviewed.  He declines 
nephrostomy tube.  Ongoing noncompliance will result in trigonal invasion of 
the contralateral kidney, result in renal failure, requiring dialysis.  He 
verbalizes understanding and declines nephrostomy tube nor prostate biopsy on 
this admission.



4. Incomplete emptying, patient subjectively not having any symptoms of pain. 
Flomax and Proscar was initiated by hospitalist on admission.

  will increase Flomax  to b.i.d.  I will reassess his voiding parameters.  I 
do have concerns regarding this patient being discharged with indwelling Andrew, 
as there is high risk of trauma/ traumatic Andrew catheter removal. If he 
develops hematuria trauma with his prostate cancer, hematuria may be very 
difficult to control. As such  repeat voiding trial when he is ready for 
discharge, if no significant postvoid residual concern and mild incomplete void 
I do believe it to be safer for him to be discharged without indwelling Anrdew 
catheter with aggressive medical therapy.   I will recheck another PSA on this 
admission.  It may have a component of elevation due to catheter placement, 
however follow-up PSA to assess for gross elevation from previous.  Prognosis 
remains guarded and poor.  Consider Palliative Care consult.  He may be 
discharged in the next few days if he is cleared by neurosurgery /and medical 
team. Follow-up appointment with Dr. Dye should be provided prior to 
discharge. We'll initiate Casodex .

 

IRENE

## 2018-07-17 NOTE — CT
PRELIMINARY REPORT/[facility]/EMERGENTY AFTER-HOURS PROCEDURE 

 

CT Angiography Chest With Intravenous Contrast

 

CLINICAL HISTORY:

78 years old, male; Signs and symptoms; Dyspnea; Other: Diaphoretic; Patient HX: Diaphoretic rt leg p
ain + d dimer; Additional info: Pt has sevre ca fround out post scan/reviewed patient's previous note
s and radiology studies. Patient has advanced metastatic cancer seen on previous studies. Has rae dolany been told multiple times about the findings. I reviewed previous findings with the patient and sp
ouse. Seems to be some lack of understanding and/or acceptance about the nature and severity of his c
ondition. Directly stated the facts to patient and spouse, met with some resistance.

 

TECHNIQUE:

Axial computed tomographic angiography images of the chest with intravenous contrast using pulmonary 
embolism protocol.

MIP reconstructed images were created and reviewed.

 

CONTRAST:

100 mL of [id number] administered intravenously.

 

COMPARISON:

No relevant prior studies available.

 

FINDINGS:

Pulmonary arteries: Unremarkable. No pulmonary embolism.

Aorta: Calcifications in the walls of the aorta and other arteries are consistent with atherosclerosi
s. No thoracic aortic aneurysm or dissection is identified.

Lungs: Small low density areas adjacent to left subscapularis muscle are likely bullae. Ground glass 
opacities in the lungs may be due to under expansion and edema. There is evidence of air trapping. Th
ere is a consolidation in the left upper lobe. Multiple bilateral pulmonary nodules m 2.1 x 1.4 cm in
 the left lung base.

Pleural space: Unremarkable. No significant effusion. No pneumothorax.

Heart: There are widespread coronary artery calcifications. The heart is enlarged.

Mediastinum: There is a small hiatal hernia.

Thyroid: Subcentimeter thyroid lesions are indeterminate.

Bones/joints: There are multiple sclerotic bone lesions. There are degenerative changes in the spine.
 No acute fracture. No dislocation.

Soft tissues: Unremarkable.

Lymph nodes: An enlarged mediastinal lymph node measures 1.9 x 1.4 cm. There are confluent enlarged l
eft hilar lymph nodes with overall measurements 2.6 x 1.8 cm.

 

IMPRESSION:

1. Multiple bilateral lung nodules consistent with metastases.

2. Widespread osseous metastases.

3. Mediastinal and left hilar lymphadenopathy.

4. No pulmonary embolism identified.

5. No thoracic aortic aneurysm or dissection identified.

6. Additional findings as above.

_______________________________________________

CT Angiography Abdomen and Pelvis With Intravenous Contrast

 

TECHNIQUE:

Axial computed tomographic angiography images of the abdomen and pelvis with intravenous contrast. Al
l CT scans at this facility use at least one of these dose optimization techniques: automated exposur
e control; mA and/or kV adjustment per patient size (includes targeted exams where dose is matched to
 clinical indication); or iterative reconstruction. 3D reconstructed images were created and reviewed
.

 

CONTRAST:

100 mL of [id number] administered intravenously. 100 mL of [id number] administered intravenously.

 

COMPARISON:

No relevant prior studies available.

 

FINDINGS:

 

VASCULATURE:

Aorta: Calcifications in the walls of the aorta and other arteries are consistent with atherosclerosi
s. No abdominal aortic aneurysm or dissection is identified.

Celiac trunk and mesenteric arteries: No acute findings. No occlusion or significant stenosis.

Renal arteries: No acute findings. No occlusion or significant stenosis.

Iliac arteries: No acute findings. No occlusion or significant stenosis.

 

ABDOMEN:

Liver: There is evidence of fatty infiltration of the liver.

Gallbladder and bile ducts: Unremarkable. No calcified stones. No ductal dilation.

Pancreas: Unremarkable. No ductal dilation. No mass.

Spleen: Unremarkable. No splenomegaly.

Adrenals: Unremarkable. No mass.

Kidneys and ureters: There is severe left hydronephrosis with hydroureter. The cause of ureteral obst
ruction is apparently the pelvic mass.

Stomach and bowel: Diverticulosis is identified in the colon. No obstruction. No mucosal thickening.

 

PELVIS:

Appendix: The appendix is not identified.

Bladder: There is a 6.0 x 5.5 cm lobulated mass in the pelvis. It contains calcifications and invades
 the urinary bladder.

Reproductive: See above.

 

ABDOMEN and PELVIS:

Intraperitoneal space: Unremarkable. No significant fluid collection. No free air.

Bones/joints: There are widespread sclerotic osseous lesions. No acute fracture. No dislocation.

Soft tissues: A small hernia in the umbilical area contains fat.

Lymph nodes: There is confluent retroperitoneal lymphadenopathy. The largest lymph node measures 3.6 
x 2.9 cm. Enlarged bilateral pelvic sidewall lymph nodes measure up to 6.8 x 2.8 cm. There is left in
guinal lymphadenopathy.

 

IMPRESSION:

1. Lobulated pelvic mass invading the urinary bladder and causing left ureteral obstruction.

2. Widespread retroperitoneal and pelvic sidewall lymphadenopathy.

3. Widespread osseous metastatic disease.

4. No abdominal aortic aneurysm or dissection identified.

5. Additional findings as above.

 

Thank you for allowing us to participate in the care of your patient.

Dictated and Authenticated by: Terry Mendoza MD

07/17/2018 1:06 AM Central Time ([place] & [place])

 

 

FINAL REPORT 

 

CT AORTIC DISSECTION PROTOCOL:

 

DATE: 7/16/18.

 

COMPARISON: 

Comparison is made with the prior study dated 2/25/18.  That exam showed diffuse metastatic disease o
f the bones, urinary bladder, and extensive adenopathy.  

 

FINDINGS: 

Axial slices were acquired today after giving IV contrast.  Coronal and sagittal reconstructions were
 then done.  Because of the findings, the scan covered the entire abdomen and most of the pelvis.  

 

There is reasonably good opacification of the aorta that shows no evidence of dissection or aneurysm.
  Arteriosclerotic change is seen throughout it.  There is normal filling of the celiac artery and me
senteric arteries.  Both renal arteries fill appropriately.  The iliac arteries fill appropriately.  
Thus, there is no sign of aortic disease beyond arteriosclerosis.

 

The thoracic portion of the CT shows several pulmonary nodules, bilateral, with the largest measuring
 about 2 cm in the left upper lobe.  There is significant adenopathy present with 1 node just lateral
 to the aortic arch measuring up to about 8.4 cm in size.  There is a density adjacent to this that i
s probably collapsed atelectatic lung.  There are no effusions.  No filling defects were seen in the 
pulmonary arteries to suggest emboli.  No pericardial effusion is seen..  Areas of sclerosis and a fe
w lytic areas are seen within bone, spine, and ribs, consistent with metastatic disease.  

 

The abdominal portion of the CT suggests a small hiatal hernia.  No focal hepatic mass was seen.  Gal
lbladder appears normal as does the pancreas and spleen.  There appears to be a small 2 cm mass in th
e right adrenal gland that I do not appreciate on the February study and there may be a small one on 
the left.  The right kidney showed no acute change.  The left kidney shows longstanding hydronephrosi
s and thinning of its cortex.  There is massive paraaortic and pericaval adenopathy as before.  This 
continues down into the pelvis with large amounts of iliac adenopathy, more on the left than the righ
t.  

 

CT of the pelvis shows the large mass protruding into the urinary bladder, probably of prostatic orig
in with adjacent severe adenopathy.  Some johnny groups are as big as 5-6 cm in size.  No free fluid i
s seen.  There are advanced changes of metastatic disease in the bony pelvis and lumbar spine consist
ing of lytic or sclerotic lesions, mainly sclerotic.  There is severe spinal stenosis at the L4-L5 le
deon.  Otherwise, I do not see any gross spinal canal obstruction. 

 

IMPRESSION: 

1.  No evidence of aortic dissection or aneurysm.

 

2.  Evidence of diffuse metastatic disease including pulmonary nodules, changes in the bones, and alea
nopathy.

 

3.  Enlarged nodes are most prominent in the paraaortic, pericaval, and iliac chains, particularly th
e left iliac region.  There are also mesenteric nodes present as well as mediastinal nodes.  

 

4.  Severe spinal stenosis of L3-L4.

 

5.  Severe left hydronephrosis, longstanding, due to mass in the urinary bladder that is most likely 
of prostatic origin.

 

Note, overall, the appearance of these findings has increased and worsened since the 2/25/18 scan.  N
odes are more numerous and larger and there are more bony lesions than before.  

 

Report in agreement with preliminary reading by [facility].

 

POS: HOME

## 2018-07-17 NOTE — ULT
BILATERAL LOWER EXTREMITY VENOUS DOPPLER WITH SPECTRAL ANALYSIS AND COLOR FLOW EVALUATION

7/17/18

 

HISTORY: 

Bilateral lower extremity swelling and pain. 

 

FINDINGS:  

Gray scale, color flow, doppler evaluation, with spectral analysis of the bilateral lower extremity v
enous structures is performed with 2D imaging. The bilateral lower extremity common femoral, superfic
ial femoral, popliteal, posterior tibial, and most proximal greater saphenous 

and profunda femoral veins are imaged. 

 

There is normal lumen compressibility, flow, and augmentation in the visualized deep venous structure
s of the bilateral lower extremities.

 

IMPRESSION:  

No evidence of a DVT involving the visualized deep venous structures bilateral lower extremities.

 

POS: [facility]

## 2018-07-17 NOTE — CON
DATE OF CONSULTATION:  07/17/2018

 

ATTENDING PHYSICIAN:  Dr. Juan Antonio Hankins

 

HISTORY OF PRESENT ILLNESS:  The patient is a 78-year-old  male with a past medical history 
of hypertension, kidney stones, elevated PSA with recent diagnosis of prostate cancer who presented t
o the emergency department as transfer from [facility] for bilateral leg discomfort and urinary inco
ntinence.  The patient reports that his symptoms of bilateral leg pain began in February as well as i
ntermittent urinary incontinence and the only being able to go small amounts at a time.  He describes
 his bilateral leg pain as a burning sensation which is diffuse from the knees down.  He denies any l
eg weakness, numbness, tingling, difficulty walking or fecal incontinence.  The patient was recently 
seen by Jamar Gomez in the office for similar symptoms approximately 1 month ago and was recommend
ed for outpatient lumbar MRI.  The patient did not follow up with this imaging at that time.  He also
 states several months ago he was told he likely has prostate cancer, but he also did not follow up f
urther for this diagnosis.  The patient received lumbar MRI through the emergency department which wa
s notable for bony enhancement at multiple levels, particularly L5 and the sacrum consistent with lik
ely metastatic bony lesions as well as a broad based disk bulge at L4-L5 with some central canal sten
osis.  Other CT imaging is notable for bilateral lung nodules consistent with metastases, mediastinal
 and hilar lymphadenopathy and a lobulated pelvic mass which invades the urinary bladder causing left
 ureteral obstruction along with retroperitoneal and pelvic lymphadenopathy.  The patient appears to 
have widespread metastatic disease.  

 

I am seeing the patient at the bedside.  He is awake, alert, in no acute distress.  His vital signs a
re stable.  He is moving all 4s without difficulty.  He has no focal motor weakness.  He is hyporefle
xive throughout the lower extremities.  Negative Nash's, negative clonus.

 

PAST MEDICAL HISTORY:  Hypertension, elevated PSA with a suspected diagnosis of prostate cancer; butterfield
chad, the patient has not followed up further for this.

 

PAST SURGICAL HISTORY:  Appendectomy.

 

SOCIAL HISTORY:  The patient does not smoke, drink or use any drugs.

 

FAMILY HISTORY:  Noncontributory.

 

ALLERGIES:  The patient has no known drug allergies.

 

MEDICATIONS:  Amlodipine 10 mg tab 1 tab p.o. daily, losartan 100 mg tab 1 tab p.o. daily, tramadol 5
0 mg tab 1 tab p.o. p.r.n., Colace 100 mg tab 1 tab p.o. b.i.d., aspirin 81 mg tab 1 tab p.o. q.a.m.

 

PHYSICAL EXAMINATION:  

VITAL SIGNS:  BP is 157/91, pulse is 116.  O2 is 98%.

CONSTITUTIONAL:  The patient is awake, alert, no acute distress.

HEAD:  Normocephalic, atraumatic.

EYES:  PERRLA. Extraocular movements are intact.

ENT:  Oral mucosa is pink, intact and moist.  Patient has normal voice.

NECK:  Nontender to palpation.  Free active range of motion, no meningismus or nuchal rigidity.

RESPIRATORY:  Symmetric chest expansion, no evidence of respiratory distress.

CARDIOVASCULAR:  The patient is tachycardic.

BACK:  Nontender to palpation.  Free active range of motion.

MUSCULOSKELETAL:  He has free active range of motion of all extremities.  No focal motor weakness is 
appreciated.  The patient is hyporeflexive throughout the upper and lower extremities 0-4.

NEURO:  He is alert and oriented x4.  No focal neurologic deficits are appreciated.  The patient has 
normal speech.  He has normal gait.

 

ASSESSMENT AND PLAN:  This is an unfortunate 78-year-old male who presents to the ER [facility] for wors
ening bilateral leg pain as well as some decreased urination, only able to go small amounts at a time
 and intermittent incontinence.  This has been progressive over the last several months.  He has a re
cent diagnosis of likely prostate cancer, but he has not followed up for this with Dr. Dye.

 

His CT imaging today shows widespread metastatic disease.  He also had a lumbar MRI which did show me
tastatic lesions to the spine and had broad based disk bulge at L4-L5 with moderate central canal xander
nosis.  I reviewed this imaging with Dr. Hankins who thought although he does have stenosis at this 
level, this is less likely contributing to his urinary complaints.  His urinary issues are more likel
y related to his underlying prostate disease.  With regards to his leg pain, he has no focal motor we
akness or any other deficits at this time.  We will recommend that he follow outpatient with Neurosur
jaye for continued evaluation of his back.  Please reach out to Neurosurgery for additional questions
 or concerns.

## 2018-07-18 LAB
ANION GAP SERPL CALC-SCNC: 12 MMOL/L (ref 10–20)
BASOPHILS # BLD AUTO: 0 THOU/UL (ref 0–0.2)
BASOPHILS NFR BLD AUTO: 0.3 % (ref 0–1)
BUN SERPL-MCNC: 27 MG/DL (ref 8.4–25.7)
CALCIUM SERPL-MCNC: 7.9 MG/DL (ref 7.8–10.44)
CHLORIDE SERPL-SCNC: 111 MMOL/L (ref 98–107)
CO2 SERPL-SCNC: 22 MMOL/L (ref 23–31)
CREAT CL PREDICTED SERPL C-G-VRATE: 61 ML/MIN (ref 70–130)
EOSINOPHIL # BLD AUTO: 0 THOU/UL (ref 0–0.7)
EOSINOPHIL NFR BLD AUTO: 0.4 % (ref 0–10)
GLUCOSE SERPL-MCNC: 80 MG/DL (ref 83–110)
HGB BLD-MCNC: 12 G/DL (ref 14–18)
LYMPHOCYTES # BLD: 1.2 THOU/UL (ref 1.2–3.4)
LYMPHOCYTES NFR BLD AUTO: 15.6 % (ref 21–51)
MCH RBC QN AUTO: 31.8 PG (ref 27–31)
MCV RBC AUTO: 94.9 FL (ref 78–98)
MONOCYTES # BLD AUTO: 0.5 THOU/UL (ref 0.11–0.59)
MONOCYTES NFR BLD AUTO: 6.7 % (ref 0–10)
NEUTROPHILS # BLD AUTO: 6 THOU/UL (ref 1.4–6.5)
NEUTROPHILS NFR BLD AUTO: 77 % (ref 42–75)
PLATELET # BLD AUTO: 228 THOU/UL (ref 130–400)
POTASSIUM SERPL-SCNC: 4.5 MMOL/L (ref 3.5–5.1)
RBC # BLD AUTO: 3.77 MILL/UL (ref 4.7–6.1)
SODIUM SERPL-SCNC: 140 MMOL/L (ref 136–145)
WBC # BLD AUTO: 7.8 THOU/UL (ref 4.8–10.8)

## 2018-07-18 RX ADMIN — Medication SCH ML: at 08:09

## 2018-07-18 RX ADMIN — Medication SCH: at 20:58

## 2018-07-18 NOTE — PRG
DATE OF SERVICE:   07/18/2018

 

SUBJECTIVE:  The patient is resting, wife is at bedside, complaining of 
neuropathic pain, burning-like sensation is right lower leg on Lyrica.  Per 
nursing staff the patient refused his Casodex, and his Flomax b.i.d. dosing due 
to history of urinary retention.  When inquired regarding why he did not comply 
with medical therapy, he states that he does not like taking medication at 
night.  I informed him the importance of compliance with medical therapy as he 
presents with grossly metastatic prostate cancer.  His prognosis and diagnosis 
reviewed at bedside with the patient and his wife in detail and the importance 
of follow up with Medical Oncology.  They verbalize understanding and states 
that they will comply with Casodex / Flomax as previously prescribed.  

 

PHYSICAL EXAMINATION: 

VITAL SIGNS:  Stable.  He is afebrile.  Urine output 1920 of clear concentrated 
yellow urine.

ABDOMEN:  Soft, nontender, nondistended.  

:  Andrew catheter draining yellow urine.

 

LABORATORY DATA:  Creatinine improved from creatinine of 1.7, 1.4, subsequently 
this morning is 1.2, PSA 03/2018 852.  Repeat PSA is pending.  Urine culture 
preliminary is negative.  His prior UA did not demonstrate microscopic hematuria
, repeat UA was sent via Andrew catheter demonstrating microhematuria catheter 
related, no gross evidence of infection.

 

IMPRESSION AND PLAN:

1.  Mr. Link is a 78-year-old male with clinical history consistent with 
grossly metastatic prostate cancer.

2.  History of noncompliance.

3.  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

4.  Prostate cancer with local extension involving the left hemitrigone 
resulting in left hydronephrosis, chronic

5.  Bladder mass consistent with local invasive prostate cancer.

6.  Left hydronephrosis due to local invasion, longstanding.

 

RECOMMENDATIONS:  We previously discussed indications for nephrostomy tube, he 
refuses.  Moreover, given the prolonged interval of hydronephrosis, concern 
regarding permanent compromise of the left kidney.  I informed the patient 
regarding compliance with medical therapy is advised with Flomax b.i.d. dosing.
  I did initiate Casodex as Lupron will be provided by Medical Oncology in the 
very near future.  He will be provided an appointment with Dr. Dye.  A long 
discussion with patient and wife at the bedside regarding importance of 
compliance with medical therapy as he presents with metastatic prostate cancer, 
as his prognosis is poor, progression of cancer resulting in renal failure, 
pathologic fracture, morbidity/mortality reviewed.  Discussed with Hospitalist 
regarding better pain control of his neuropathic pain.  Recommend [place].  As 
outpatient continue Flomax b.i.d., Casodex as outpatient.   will remove his 
Andrew catheter tomorrow morning to assess for degree of postvoid residual.  I 
informed the patient if it is relatively stable, I will consider him to go home 
without a Andrew catheter.  He states  that he will not go home with the Andrew 
catheter.  I informed him that if he agrees I will keep him overnight and see 
what his voiding status is tomorrow for further consultation.  He agrees to 
that.

 

IRENE

## 2018-07-18 NOTE — CON
DATE OF CONSULTATION:  07/17/2018

 

REASON FOR CONSULTATION:  Prostate cancer.

 

HISTORY OF PRESENT ILLNESS:  Mr. Link is a 78-year-old  male who saw 
Dr. Dye in 03/2018.  He was diagnosed with metastatic hormone sensitive 
prostate cancer.  The recommendation was that he began Lupron and Zytiga.  
Unfortunately, the patient did not follow up with Dr. Dye.  He states that 
he was unable to afford the Zytiga medication, although he did not inform us of 
his concerns. He did not start Lupron injections. He saw Dr. Keller in 
[place] but did not follow-up with him either. Over the last few months, he has 
had some burning down the back of his leg.  He has seen Dr. Palumbo and was 
diagnosed with spinal stenosis.  He presented to the emergency room yesterday 
for lower extremity weakness and pain.  Neurosurgery has seen the patient and 
feels that his burning is likely from metastatic disease rather than his spinal 
stenosis.  Dr. Edgar has seen Mr. Link.  Patient does have left 
hydronephrosis.  She discussed a nephrostomy tube and possible stents with the 
patient.  He declined at this time.  He has no complaints at this time other 
than occasional back pain, depending on how he moves his leg.

 

PAST MEDICAL HISTORY:

1.  Metastatic prostate cancer.

2.  Hyperlipidemia.

3.  Hypertension.

4.  History of kidney stones.

5.  Spinal stenosis.

6.  Noncompliance

 

PAST SURGICAL HISTORY:  Appendectomy.

 

ALLERGIES:  No known drug allergies.

 

HOME MEDICATIONS:

1.  Amlodipine 10 mg daily.

2.  Aspirin daily.

3.  Dulcolax daily.

4.  Losartan 100 mg daily.

5.  Tramadol p.r.n.

 

FAMILY HISTORY:  No history of malignancy.

 

SOCIAL HISTORY:  He is , has one child.  Lives with his spouse.  No 
alcohol, tobacco, or illicit drug use.

 

REVIEW OF SYSTEMS:  Twelve-point review of systems is negative except for noted 
in HPI.

 

PHYSICAL EXAMINATION:

VITAL SIGNS:  Temperature is 97.5, pulse is 62, respiratory rate 18, BP is 129/
75.  He is 95% on room air.

GENERAL:  This is a disheveled male in no acute distress.

HEENT:  Normocephalic, atraumatic.  Pupils equal and reactive to light.

NECK:  Supple.

CARDIOVASCULAR:  Regular rate and rhythm.

LUNGS:  Clear.

ABDOMEN:  Soft, nontender, bowel sounds are positive.

GENITOURINARY:  He has a Andrew catheter with yellow urine.

EXTREMITIES:  He has got 1+ bilateral lower extremities.

SKIN:  No rash.

HEMATOLOGIC:  No petechia or purpura.

NEUROLOGIC:  Nonfocal.

PSYCHIATRIC:  Patient is alert and oriented.

 

PERTINENT LABORATORY AND X-RAYS:  Current WBCs are 8.0, hemoglobin 13.4, 
hematocrit 40.2, platelet count 250,000.  He has got 86% neutrophils, 12% 
lymphocytes.  Sodium is 138, potassium 5.0, chloride 110, CO2 is 19, BUN is 30, 
creatinine 1.46, calcium is 8.7.  Bilirubin 0.3, AST is 15, ALT is 9, alkaline 
phosphatase is 294.  Serum total protein is 6.7, albumin 3.7, globulin 3.  
Urine showed small leukocyte esterase, small blood, no bacteria.  MRI of the 
lumbar spine showed diffuse metastatic lesions of the bone.

 

ASSESSMENT:  Metastatic prostate cancer.

 

DISCUSSION:  Patient has not seen Dr. Dye since March.  He has failed to 
follow up regarding treatment options.  We discussed possible financial 
assistance with chemotherapy.  We also discussed Lupron. He has been started on 
Casodex by Urology.  If he chooses to seek treatment for his prostate cancer, 
he can follow up with Dr. Dye in the clinic.  I have discussed this with 
Dr. Dye. If he does not wish to see Dr. Dye, I encouraged him to seek 
treatment elsewhere. He states he understand the gravity of his metastatic 
disease. 

 

Thank you for the consult.

 

[facility]ENNIFER

## 2018-07-18 NOTE — PDOC.PN
- Subjective


Encounter Start Date: 07/18/18


Encounter Start Time: 09:25


Subjective: awake, not in distress


-: has burning sensation over both legs





- Objective


Resuscitation Status: 


 











Resuscitation Status           FULL:Full Resuscitation














MAR Reviewed: Yes


Vital Signs & Weight: 


 Vital Signs (12 hours)











  Temp Pulse Pulse Pulse Resp BP BP


 


 07/18/18 09:05    105 H  105 H    152/86 H


 


 07/18/18 08:00  97.6 F  106 H    18  163/84 H 


 


 07/18/18 07:21  97.6 F  106 H    18  


 


 07/18/18 06:03  98.2 F  85    18  


 


 07/18/18 01:15       














  BP BP BP Pulse Ox Pulse Ox Pulse Ox


 


 07/18/18 09:05  114/80     89 L  96


 


 07/18/18 08:00     93 L  


 


 07/18/18 07:21   163/84 H   93 L  


 


 07/18/18 06:03   144/76 H   99  


 


 07/18/18 01:15    163/82 H   








 Weight











Weight                         196 lb 3.382 oz














I&O: 


 











 07/17/18 07/18/18 07/19/18





 06:59 06:59 06:59


 


Intake Total  2010 


 


Output Total  1920 


 


Balance  90 











Result Diagrams: 


 07/18/18 05:00





 07/18/18 05:00





Phys Exam





- Physical Examination


HEENT: PERRLA, moist MMs


Neck: no JVD, supple


Respiratory: no wheezing, no rales


Cardiovascular: RRR, no significant murmur


Gastrointestinal: soft, non-tender, positive bowel sounds


Musculoskeletal: no edema, pulses present


Neurological: non-focal, moves all 4 limbs


Psychiatric: normal affect, A&O x 3





Dx/Plan


(1) Prostate cancer metastatic to multiple sites


Code(s): C61 - MALIGNANT NEOPLASM OF PROSTATE   Status: Acute   





(2) KENAN (acute kidney injury)


Code(s): N17.9 - ACUTE KIDNEY FAILURE, UNSPECIFIED   Status: Acute   





(3) Hydronephrosis, left


Code(s): N13.30 - UNSPECIFIED HYDRONEPHROSIS   Status: Acute   Comment: sec to 

prostate cancer invasion of trigone and ureteral orifice on left   





(4) HTN (hypertension)


Code(s): I10 - ESSENTIAL (PRIMARY) HYPERTENSION   Status: Chronic   


Qualifiers: 


   Hypertension type: essential hypertension   Qualified Code(s): I10 - 

Essential (primary) hypertension   





(5) Spinal stenosis of lumbar region


Code(s): M48.061 - SPINAL STENOSIS, LUMBAR REGION WITHOUT NEUROGENIC CASA   

Status: Chronic   


Qualifiers: 


   Neurogenic claudication status: unspecified   Qualified Code(s): M48.061 - 

Spinal stenosis, lumbar region without neurogenic claudication   


Comment: L3-4   





- Plan


is on casodex, flomax and finasteride


-: counselled reg med compliance and f/u with specialists


-: is aware of very advanced prostate cancer with mets


-: d/w , will re-eval in am for keenan removal 


-: trial of amitryptiline along with lyrica for burning pain in legs





* .


Will dc amitryptiline if he has voiding issues and add gabapentin if needed or 

increase lyrica dose based on renal function.


To ambulate in hallway


discussed with patient and wife at length about all the tests results, current 

diagnosis and need for f/u with specialists.


Also gave a option of hospice/palliative care if he didn't want to pursue med 

treatment for cancer.


I have also drawn anatomic line diagrams to show him the situation of his left 

ureter and cancer encroachment.


Plan is for dc in am if ok with 


May switch to inpatient if he qualifies, d/w Mr.Rodney FRENCH





Review of Systems





- Medications/Allergies


Allergies/Adverse Reactions: 


 Allergies











Allergy/AdvReac Type Severity Reaction Status Date / Time


 


No Known Allergies Allergy   Unverified 07/17/18 07:41











Medications: 


 Current Medications





Acetaminophen (Tylenol)  650 mg PO Q4H PRN


   PRN Reason: Headache/Fever or Pain


Amitriptyline HCl (Elavil)  50 mg PO DAILY [facility]


Amlodipine Besylate (Norvasc)  10 mg PO 2030 [facility]


Bicalutamide (Casodex)  50 mg PO DAILY [facility]


   Last Admin: 07/18/18 08:20 Dose:  Not Given


Enoxaparin Sodium (Lovenox)  40 mg SC 0900 [facility]


   Last Admin: 07/18/18 08:09 Dose:  Not Given


Famotidine (Pepcid)  20 mg PO BID [facility]


   Last Admin: 07/18/18 08:08 Dose:  Not Given


Finasteride (Proscar)  5 mg PO DAILY [facility]


   Last Admin: 07/18/18 08:07 Dose:  Not Given


Guaifenesin/Dextromethorphan (Robitussin Dm)  15 ml PO Q4H PRN


   PRN Reason: Cough


Pregabalin (Lyrica)  75 mg PO BID [facility]


   Last Admin: 07/18/18 08:08 Dose:  Not Given


Sodium Chloride (Flush - Normal Saline)  10 ml IVF Q12HR [facility]


   Last Admin: 07/18/18 08:09 Dose:  10 ml


Sodium Chloride (Flush - Normal Saline)  10 ml IVF PRN PRN


   PRN Reason: Saline Flush


Tamsulosin HCl (Flomax)  0.4 mg PO BID [facility]


   Last Admin: 07/18/18 08:08 Dose:  0.4 mg


Tramadol HCl (Ultram)  50 mg PO Q6H PRN


   PRN Reason: Pain

## 2018-07-19 RX ADMIN — Medication SCH: at 20:13

## 2018-07-19 RX ADMIN — Medication SCH ML: at 08:22

## 2018-07-19 NOTE — PRG
DATE OF SERVICE:  07/19/2018

 

SUBJECTIVE:  The patient  voided, PVR approximately 114 mL, Andrew catheter was 
removed at 5:00 a.m.

 

PHYSICAL EXAMINATION:

VITAL SIGNS:  Stable, afebrile.

ABDOMEN:  Soft, nontender, nondistended.

 

LABORATORY DATA:  Repeat PSA which I obtained 07/18/2018 finalized at 2274.  
Urine culture preliminary is negative.

 

IMPRESSION AND PLAN:  

1.  A 78-year-old male with history of metastatic prostate cancer, noncompliant.

2.  History of  mL on this admission.

3.  History of left hydro longstanding due to trigonal invasion of prostate 
cancer.

4.  Bladder mass consistent with locally invasive prostate cancer.  I have 
previously discussed with patient regarding nephrostomy tube, which he declines
, however, it is unlikely the kidney has adequate function given prolonged 
obstructive component.  I recommend the patient be discharged with Casodex.  He 
will follow up with Dr. Dye for Lupron in situ/prednisone.

5.  Urinary retention.  Andrew catheter removed today, will monitor for 
significant elevation of PVR, patient, although refuses a Andrew catheter; 
however, I will assess for grossly significant retention of concern.  If it is 
near his baseline, I am okay with him being discharged with indwelling Andrew 
catheter as there is higher risk of him removing his catheter given his 
clinical history, and his history of noncompliance is concerning to me.  He has 
a follow up with me, appointment in chart.  Please discharge, patient with 
Casodex, Flomax 0.4 mg one p.o. b.i.d.  Nursing staff will contact me later 
this morning regarding update of repeat PVR.  From a urologic perspective, upon 
my review I will recheck PVR.  The patient can be discharged.

Updated from nurse PVR largest 140. This is significantly improved. No change 
in plans discharged with above medication from urologic perspective when 
medically cleared 

 

MTDD

## 2018-07-19 NOTE — PDOC.PN
- Subjective


Encounter Start Date: 07/19/18


Encounter Start Time: 09:20


Subjective: got his keenan removed at 4am per patient


-: has been voiding small amounts of urine per patient


-: no pain/burning in LE now





- Objective


Resuscitation Status: 


 











Resuscitation Status           FULL:Full Resuscitation














MAR Reviewed: Yes


Vital Signs & Weight: 


 Vital Signs (12 hours)











  Temp Pulse Resp BP BP BP Pulse Ox


 


 07/19/18 11:21       102/67 


 


 07/19/18 08:00  98.7 F  102 H  20  94/64  94/64   100


 


 07/19/18 04:10  98.1 F  95  20   118/75   95








 Weight











Admit Weight                   196 lb 3.382 oz


 


Weight                         196 lb 3.382 oz














I&O: 


 











 07/18/18 07/19/18 07/20/18





 06:59 06:59 06:59


 


Intake Total 2010 981 


 


Output Total 1920 2070 


 


Balance 90 -1089 











Result Diagrams: 


 07/18/18 05:00





 07/18/18 05:00





Phys Exam





- Physical Examination


HEENT: PERRLA, moist MMs


Neck: no JVD, supple


Respiratory: no wheezing, no rales


Cardiovascular: RRR, no significant murmur


Gastrointestinal: soft, non-tender, positive bowel sounds


Musculoskeletal: no edema, pulses present


Neurological: non-focal, moves all 4 limbs


Psychiatric: A&O x 3





Dx/Plan


(1) Prostate cancer metastatic to multiple sites


Code(s): C61 - MALIGNANT NEOPLASM OF PROSTATE   Status: Acute   





(2) KENAN (acute kidney injury)


Code(s): N17.9 - ACUTE KIDNEY FAILURE, UNSPECIFIED   Status: Resolved   





(3) Hydronephrosis, left


Code(s): N13.30 - UNSPECIFIED HYDRONEPHROSIS   Status: Acute   Comment: sec to 

prostate cancer invasion of trigone and ureteral orifice on left   





(4) HTN (hypertension)


Code(s): I10 - ESSENTIAL (PRIMARY) HYPERTENSION   Status: Chronic   


Qualifiers: 


   Hypertension type: essential hypertension   Qualified Code(s): I10 - 

Essential (primary) hypertension   





(5) Spinal stenosis of lumbar region


Code(s): M48.061 - SPINAL STENOSIS, LUMBAR REGION WITHOUT NEUROGENIC CASA   

Status: Chronic   


Qualifiers: 


   Neurogenic claudication status: unspecified   Qualified Code(s): M48.061 - 

Spinal stenosis, lumbar region without neurogenic claudication   


Comment: L3-4   





- Plan


dc amitryptiline, continue lyrica 75mg daily


-: continue casodex, flomax and finasteride


-: d/w pt and wife 


-: may dc home once cleared by urology today





* .








Review of Systems





- Medications/Allergies


Allergies/Adverse Reactions: 


 Allergies











Allergy/AdvReac Type Severity Reaction Status Date / Time


 


No Known Allergies Allergy   Unverified 07/17/18 07:41











Medications: 


 Current Medications





Acetaminophen (Tylenol)  650 mg PO Q4H PRN


   PRN Reason: Headache/Fever or Pain


   Last Admin: 07/18/18 17:15 Dose:  650 mg


Amitriptyline HCl (Elavil)  50 mg PO DAILY [facility]


   Last Admin: 07/19/18 08:22 Dose:  Not Given


Amlodipine Besylate (Norvasc)  10 mg PO 2030 [facility]


   Last Admin: 07/18/18 20:48 Dose:  Not Given


Bicalutamide (Casodex)  50 mg PO DAILY [facility]


   Last Admin: 07/19/18 08:19 Dose:  50 mg


Enoxaparin Sodium (Lovenox)  40 mg SC 0900 [facility]


   Last Admin: 07/19/18 08:22 Dose:  Not Given


Famotidine (Pepcid)  20 mg PO BID [facility]


   Last Admin: 07/19/18 08:22 Dose:  Not Given


Finasteride (Proscar)  5 mg PO DAILY [facility]


   Last Admin: 07/19/18 08:21 Dose:  5 mg


Guaifenesin/Dextromethorphan (Robitussin Dm)  15 ml PO Q4H PRN


   PRN Reason: Cough


Pregabalin (Lyrica)  75 mg PO BID [facility]


   Last Admin: 07/19/18 08:19 Dose:  75 mg


Sodium Chloride (Flush - Normal Saline)  10 ml IVF Q12HR [facility]


   Last Admin: 07/19/18 08:22 Dose:  10 ml


Sodium Chloride (Flush - Normal Saline)  10 ml IVF PRN PRN


   PRN Reason: Saline Flush


Tamsulosin HCl (Flomax)  0.4 mg PO BID [facility]


   Last Admin: 07/19/18 08:19 Dose:  0.4 mg


Tramadol HCl (Ultram)  50 mg PO Q6H PRN


   PRN Reason: Pain DISPLAY PLAN FREE TEXT

## 2018-07-20 VITALS — TEMPERATURE: 99.4 F | DIASTOLIC BLOOD PRESSURE: 76 MMHG | SYSTOLIC BLOOD PRESSURE: 130 MMHG

## 2018-07-20 RX ADMIN — Medication SCH: at 10:36

## 2018-07-20 NOTE — PRG
DATE OF SERVICE:  07/20/2018

 

SUBJECTIVE:  The patient appears to be comfortable this morning.  His clinical 
impression significantly changed from yesterday.  He appears quite comfortable.
  States that he feels well.  Wife is at bedside.

 

PHYSICAL EXAMINATION:

VITAL SIGNS:  T-max of 100, currently 99.4, 94%, blood pressure is stable.

ABDOMEN:  Soft, nontender, nondistended.  No CVA tenderness.

 

LABORATORY DATA:  White count previously normal, creatinine 1.2.  Urine culture 
negative.  Voiding trial yesterday demonstrates postvoid residual minimal at 
114 mL.  He previously had an indwelling Andrew catheter 400 mL of post-void 
residual.



PSA>[id number]

 

IMPRESSION AND PLAN:  

1.  Mr. Link is a 78-year-old male with history of metastatic prostate cancer, 
noncompliant. 

2.  History of  on this admission,  resolved on Flomax b.i.d.

3.  History of left hydronephrosis longstanding secondary to trigonal invasion 
of prostate cancer.

4.  Bladder mass consistent with locally invasive prostate cancer.  

 

His PSA is 2274, PSA on 03/21/2018 was 852.  The patient has diffuse metastatic 
disease, his prognosis is poor.  He has a followup appointment with Dr. Dye 
to proceed with Lupron, Zytiga/prednisone as previously advised.  Please 
continue his Casodex, Flomax b.i.d. as an outpatient.  Outpatient appointment 
with me in chart.  He can be discharged when medically clear.   will sign off.

DR Baumann covering me this weekend

 

MTDD

## 2018-07-20 NOTE — PDOC.PN
- Subjective


Encounter Start Date: 07/20/18


Encounter Start Time: 07:30


Subjective: alert and awake this am


-: wants to try low dose lyrica before he goes home to see if he stays awake





- Objective


Resuscitation Status: 


 











Resuscitation Status           FULL:Full Resuscitation














MAR Reviewed: Yes


Vital Signs & Weight: 


 Vital Signs (12 hours)











  Temp Pulse Resp BP Pulse Ox Pulse Ox


 


 07/20/18 09:15       93 L


 


 07/20/18 08:00  99.4 F  109 H  18   


 


 07/20/18 07:10  99.4 F  109 H  18  130/76  94 L 








 Weight











Admit Weight                   196 lb 3.382 oz


 


Weight                         196 lb 3.382 oz














I&O: 


 











 07/19/18 07/20/18 07/21/18





 06:59 06:59 06:59


 


Intake Total 981 1580 


 


Output Total 2070 370 


 


Balance -1089 1210 











Result Diagrams: 


 07/18/18 05:00





 07/18/18 05:00





Phys Exam





- Physical Examination


HEENT: PERRLA, moist MMs


Neck: no JVD, supple


Respiratory: no wheezing, no rales


Cardiovascular: RRR, no significant murmur


Gastrointestinal: soft, non-tender, positive bowel sounds


Musculoskeletal: no edema, pulses present


Neurological: non-focal, moves all 4 limbs


Psychiatric: normal affect, A&O x 3





Dx/Plan


(1) Prostate cancer metastatic to multiple sites


Code(s): C61 - MALIGNANT NEOPLASM OF PROSTATE   Status: Acute   





(2) KENNA (acute kidney injury)


Code(s): N17.9 - ACUTE KIDNEY FAILURE, UNSPECIFIED   Status: Resolved   





(3) Hydronephrosis, left


Code(s): N13.30 - UNSPECIFIED HYDRONEPHROSIS   Status: Acute   Comment: sec to 

prostate cancer invasion of trigone and ureteral orifice on left   





(4) HTN (hypertension)


Code(s): I10 - ESSENTIAL (PRIMARY) HYPERTENSION   Status: Chronic   


Qualifiers: 


   Hypertension type: essential hypertension   Qualified Code(s): I10 - 

Essential (primary) hypertension   





(5) Spinal stenosis of lumbar region


Code(s): M48.061 - SPINAL STENOSIS, LUMBAR REGION WITHOUT NEUROGENIC CASA   

Status: Chronic   


Qualifiers: 


   Neurogenic claudication status: unspecified   Qualified Code(s): M48.061 - 

Spinal stenosis, lumbar region without neurogenic claudication   


Comment: L3-4   





- Plan


low dose lyrica for neuropathic b/l leg pains


-: hemostable


-: dc pt home


-: d/w pt and wife at bedside





* .








Review of Systems





- Medications/Allergies


Allergies/Adverse Reactions: 


 Allergies











Allergy/AdvReac Type Severity Reaction Status Date / Time


 


No Known Allergies Allergy   Unverified 07/17/18 07:41

## 2018-07-20 NOTE — DIS
DATE OF ADMISSION:  07/17/2018

 

DATE OF DISCHARGE:  07/19/2018

 

DISCHARGE DISPOSITION:  To home.

 

PRIMARY DISCHARGE DIAGNOSES:  Prostate cancer with multiple mets, noncompliant 
with followups or treatment; spinal stenosis of L3-L4 area; hypertension; left 
hydronephrosis due to encroachment of cancer on the ureteral orifice to the 
left in the trigone area of the bladder; acute kidney injury.

 

PROCEDURES DONE DURING HOSPITALIZATION:  Lumbar spine MRI done showed L4-L5 
broad-based disk bulge with moderate-to-marked spinal canal stenosis, those L5 
vertebral body signal abnormality with enhancing foci within the posterior 
elements on the right, suspicious for pathological fracture from bone 
metastasis.  There is also abnormal enhancement of the right sacrum suspicious 
for neoplastic infiltration, innumerable round mass is anterior to the spine 
suggestive of marked lymphadenopathy.  CT dissection protocol done showed 
multiple bilateral lung nodules consistent with widespread osseous metastasis 
was seen.  Mediastinal and left hilar adenopathy.  No thoracic aneurysm or 
dissection was seen.  No pulmonary embolism was seen.  Bilateral lower 
extremity venous Doppler done showed no evidence of DVT.  Urine culture, no 
growth.  H and H 12 and 35, platelet count 228,000.  White count of 7.8, MCV 
94.  His prostate specific antigen was 2274.  Admitting BUN and creatinine of 
33 and 1.7.  Discharge BUN and creatinine of 27 and 1.2, alkaline phosphatase 
was 286.

 

DISCHARGE MEDICATIONS:  Flomax 0.4 mg p.o. twice daily, Lyrica 75 mg p.o. daily
, finasteride 5 mg p.o. daily, Casodex 50 mg p.o. daily, aspirin 81 mg p.o. 
daily, Norvasc 10 mg p.o. daily, Ultram p.r.n. for pain.

 

ALLERGIES:  No known drug allergies.

 

INPATIENT CONSULTS:  Dr. Edgar for Urology, Ms. Narcisa Lopes for Dr. Dye, nurse practitioner for Oncology.

 

DISCHARGE PLAN:  Patient is to follow up with Dr. Dye as advised and Dr. Edgar as advised to follow up with primary care physician in 1 week.

 

BRIEF COURSE DURING HOSPITALIZATION:  Patient initially came to ER with 
complaints of lower extremity burning sensation.  He had a CT dissection 
protocol done at the transferring ER where he was found to have had multiple 
metastases and left hydronephrosis as well.  The patient has known history of 
prostate cancer from almost a year and has not followed up with Dr. Dye.  
He has been known to be noncompliant with medications as well.  His CAT scan 
revealed prostate cancer encroaching onto the trigone area and to the left 
ureteral orifice causing left hydronephrosis.  He also had acute kidney injury, 
which is resolved with fluid resuscitation.  The patient's lower extremity 
burning sensation and pain is likely due to neuropathy and has responded well 
to Lyrica.  He was initially on morphine for the same.  He was evaluated by Dr. Dye, nurse practitioner and Dr. Carmencita Edgar for Urology.  He was 
counseled multiple times with regard to medication compliance and followups 
with specialists.  He is new to Flomax, finasteride, Casodex, Lyrica.  Prior to 
discharge, he is voiding and has been cleared by Dr. Edgar.  He is 
otherwise hemodynamically stable and will be shortly discharged home. Please 
see a face to face documentation on [facility] for the day of discharge.

 

[facility]

## 2018-07-22 NOTE — DIS
DATE OF ADMISSION:  07/17/2018

 

DATE OF DISCHARGE:  07/20/2018 

 

BRIEF COURSE DURING HOSPITALIZATION:  Please see my discharge summary dictated on the 19th.  The hermelinda
ent remained in the hospital for increased lethargy and confusion after he took Lyrica and amitriptyl
ine.  His amitriptyline was discontinued and Lyrica was reduced to 25 mg daily.  The patient needs to
 continue on 25 mg for now on Lyrica and slowly escalate the dose for response for his lower extremit
y neuropathy.  He is otherwise hemodynamically stable and is being discharged home.  He needs to foll
ow up with Dr. Dye as advised and Dr. Edgar as advised as well.

 

Please see a face-to-face documentation on [facility] for the day of discharge.

## 2018-07-31 ENCOUNTER — HOSPITAL ENCOUNTER (EMERGENCY)
Dept: HOSPITAL 57 - BURERS | Age: 78
Discharge: ANOTHER HOSPITAL | End: 2018-07-31
Payer: MEDICARE

## 2018-07-31 ENCOUNTER — HOSPITAL ENCOUNTER (INPATIENT)
Dept: HOSPITAL 92 - ERS | Age: 78
LOS: 3 days | Discharge: HOME | DRG: 872 | End: 2018-08-03
Attending: HOSPITALIST | Admitting: HOSPITALIST
Payer: MEDICARE

## 2018-07-31 VITALS — BODY MASS INDEX: 30.9 KG/M2

## 2018-07-31 DIAGNOSIS — Z87.442: ICD-10-CM

## 2018-07-31 DIAGNOSIS — E78.5: ICD-10-CM

## 2018-07-31 DIAGNOSIS — I12.9: ICD-10-CM

## 2018-07-31 DIAGNOSIS — A41.9: Primary | ICD-10-CM

## 2018-07-31 DIAGNOSIS — E87.2: ICD-10-CM

## 2018-07-31 DIAGNOSIS — D64.9: ICD-10-CM

## 2018-07-31 DIAGNOSIS — Z90.49: ICD-10-CM

## 2018-07-31 DIAGNOSIS — N17.9: ICD-10-CM

## 2018-07-31 DIAGNOSIS — N18.3: ICD-10-CM

## 2018-07-31 DIAGNOSIS — N12: ICD-10-CM

## 2018-07-31 DIAGNOSIS — C61: ICD-10-CM

## 2018-07-31 DIAGNOSIS — C79.11: ICD-10-CM

## 2018-07-31 DIAGNOSIS — N13.30: ICD-10-CM

## 2018-07-31 DIAGNOSIS — I47.1: ICD-10-CM

## 2018-07-31 DIAGNOSIS — Z91.19: ICD-10-CM

## 2018-07-31 DIAGNOSIS — M48.062: ICD-10-CM

## 2018-07-31 DIAGNOSIS — Z85.46: ICD-10-CM

## 2018-07-31 DIAGNOSIS — I10: ICD-10-CM

## 2018-07-31 DIAGNOSIS — Z79.891: ICD-10-CM

## 2018-07-31 DIAGNOSIS — N39.0: Primary | ICD-10-CM

## 2018-07-31 DIAGNOSIS — Z79.899: ICD-10-CM

## 2018-07-31 LAB
ALBUMIN SERPL BCG-MCNC: 3.2 G/DL (ref 3.4–4.8)
ALP SERPL-CCNC: 298 U/L (ref 40–150)
ALT SERPL W P-5'-P-CCNC: 37 U/L (ref 8–55)
ANION GAP SERPL CALC-SCNC: 18 MMOL/L (ref 10–20)
AST SERPL-CCNC: 12 U/L (ref 5–34)
BACTERIA UR QL AUTO: (no result) HPF
BASOPHILS # BLD AUTO: 0 THOU/UL (ref 0–0.2)
BASOPHILS NFR BLD AUTO: 0.4 % (ref 0–1)
BILIRUB SERPL-MCNC: 0.5 MG/DL (ref 0.2–1.2)
BUN SERPL-MCNC: 60 MG/DL (ref 8.4–25.7)
CALCIUM SERPL-MCNC: 8 MG/DL (ref 7.8–10.44)
CHLORIDE SERPL-SCNC: 108 MMOL/L (ref 98–107)
CK MB SERPL-MCNC: 1.3 NG/ML (ref 0–6.6)
CO2 SERPL-SCNC: 21 MMOL/L (ref 23–31)
CREAT CL PREDICTED SERPL C-G-VRATE: 0 ML/MIN (ref 70–130)
EOSINOPHIL # BLD AUTO: 0 THOU/UL (ref 0–0.7)
EOSINOPHIL NFR BLD AUTO: 0.4 % (ref 0–10)
GLOBULIN SER CALC-MCNC: 2.8 G/DL (ref 2.4–3.5)
GLUCOSE SERPL-MCNC: 104 MG/DL (ref 83–110)
HGB BLD-MCNC: 11.2 G/DL (ref 14–18)
HYALINE CASTS #/AREA URNS LPF: (no result) LPF
LYMPHOCYTES # BLD AUTO: 1 THOU/UL (ref 1.2–3.4)
LYMPHOCYTES NFR BLD AUTO: 9.4 % (ref 21–51)
MCH RBC QN AUTO: 29.7 PG (ref 27–31)
MCV RBC AUTO: 85.2 FL (ref 78–98)
MONOCYTES # BLD AUTO: 0.3 THOU/UL (ref 0.11–0.59)
MONOCYTES NFR BLD AUTO: 3.1 % (ref 0–10)
NEUTROPHILS # BLD AUTO: 9.6 THOU/UL (ref 1.4–6.5)
NEUTROPHILS NFR BLD AUTO: 86.8 % (ref 42–75)
PLATELET # BLD AUTO: 302 THOU/UL (ref 130–400)
POTASSIUM SERPL-SCNC: 5 MMOL/L (ref 3.5–5.1)
RBC # BLD AUTO: 3.77 MILL/UL (ref 4.7–6.1)
RBC UR QL AUTO: (no result) HPF (ref 0–3)
SODIUM SERPL-SCNC: 142 MMOL/L (ref 136–145)
SP GR UR STRIP: 1.01 (ref 1–1.04)
TROPONIN I SERPL DL<=0.01 NG/ML-MCNC: 0.02 NG/ML (ref ?–0.03)
WBC # BLD AUTO: 11.1 THOU/UL (ref 4.8–10.8)

## 2018-07-31 PROCEDURE — 87086 URINE CULTURE/COLONY COUNT: CPT

## 2018-07-31 PROCEDURE — 93005 ELECTROCARDIOGRAM TRACING: CPT

## 2018-07-31 PROCEDURE — 85025 COMPLETE CBC W/AUTO DIFF WBC: CPT

## 2018-07-31 PROCEDURE — 96375 TX/PRO/DX INJ NEW DRUG ADDON: CPT

## 2018-07-31 PROCEDURE — 81015 MICROSCOPIC EXAM OF URINE: CPT

## 2018-07-31 PROCEDURE — 80053 COMPREHEN METABOLIC PANEL: CPT

## 2018-07-31 PROCEDURE — 96374 THER/PROPH/DIAG INJ IV PUSH: CPT

## 2018-07-31 PROCEDURE — 85027 COMPLETE CBC AUTOMATED: CPT

## 2018-07-31 PROCEDURE — 87040 BLOOD CULTURE FOR BACTERIA: CPT

## 2018-07-31 PROCEDURE — A4216 STERILE WATER/SALINE, 10 ML: HCPCS

## 2018-07-31 PROCEDURE — 82553 CREATINE MB FRACTION: CPT

## 2018-07-31 PROCEDURE — 83605 ASSAY OF LACTIC ACID: CPT

## 2018-07-31 PROCEDURE — 80048 BASIC METABOLIC PNL TOTAL CA: CPT

## 2018-07-31 PROCEDURE — 84484 ASSAY OF TROPONIN QUANT: CPT

## 2018-07-31 PROCEDURE — 76770 US EXAM ABDO BACK WALL COMP: CPT

## 2018-07-31 PROCEDURE — 81003 URINALYSIS AUTO W/O SCOPE: CPT

## 2018-07-31 PROCEDURE — 96366 THER/PROPH/DIAG IV INF ADDON: CPT

## 2018-07-31 PROCEDURE — 94760 N-INVAS EAR/PLS OXIMETRY 1: CPT

## 2018-07-31 PROCEDURE — 83880 ASSAY OF NATRIURETIC PEPTIDE: CPT

## 2018-07-31 PROCEDURE — 36415 COLL VENOUS BLD VENIPUNCTURE: CPT

## 2018-07-31 PROCEDURE — 71045 X-RAY EXAM CHEST 1 VIEW: CPT

## 2018-07-31 PROCEDURE — 96365 THER/PROPH/DIAG IV INF INIT: CPT

## 2018-07-31 PROCEDURE — 87077 CULTURE AEROBIC IDENTIFY: CPT

## 2018-07-31 PROCEDURE — 87149 DNA/RNA DIRECT PROBE: CPT

## 2018-07-31 PROCEDURE — 87186 SC STD MICRODIL/AGAR DIL: CPT

## 2018-07-31 PROCEDURE — 96361 HYDRATE IV INFUSION ADD-ON: CPT

## 2018-07-31 NOTE — RAD
PORTABLE CHEST

7/31/18

 

Moderate cardiomegaly is present but the vessels do not seem congested. There are no large effusions.
 There is a very small amount of streaking in the lung bases bilaterally. It would be premature to co
nfidently diagnose a pneumonia, through the areas might bear followup film. There are a few areas in 
the ribs that may be sclerotic. The patient has known osseous metastases. 

 

IMPRESSION:  

1.      Mild cardiomegaly without congestive change. 

2.      Minor basilar haziness which may or may not be significant. 

 

POS: HOME

## 2018-08-01 LAB
ANION GAP SERPL CALC-SCNC: 14 MMOL/L (ref 10–20)
BASOPHILS # BLD AUTO: 0 THOU/UL (ref 0–0.2)
BASOPHILS NFR BLD AUTO: 0.4 % (ref 0–1)
BUN SERPL-MCNC: 54 MG/DL (ref 8.4–25.7)
CALCIUM SERPL-MCNC: 7.7 MG/DL (ref 7.8–10.44)
CHLORIDE SERPL-SCNC: 111 MMOL/L (ref 98–107)
CO2 SERPL-SCNC: 18 MMOL/L (ref 23–31)
CREAT CL PREDICTED SERPL C-G-VRATE: 36 ML/MIN (ref 70–130)
EOSINOPHIL # BLD AUTO: 0 THOU/UL (ref 0–0.7)
EOSINOPHIL NFR BLD AUTO: 0.5 % (ref 0–10)
GLUCOSE SERPL-MCNC: 99 MG/DL (ref 83–110)
HGB BLD-MCNC: 9.6 G/DL (ref 14–18)
LYMPHOCYTES # BLD: 0.4 THOU/UL (ref 1.2–3.4)
LYMPHOCYTES NFR BLD AUTO: 5.2 % (ref 21–51)
MCH RBC QN AUTO: 31.9 PG (ref 27–31)
MCV RBC AUTO: 95 FL (ref 78–98)
MONOCYTES # BLD AUTO: 0.2 THOU/UL (ref 0.11–0.59)
MONOCYTES NFR BLD AUTO: 3.3 % (ref 0–10)
NEUTROPHILS # BLD AUTO: 6.6 THOU/UL (ref 1.4–6.5)
NEUTROPHILS NFR BLD AUTO: 90.6 % (ref 42–75)
PLATELET # BLD AUTO: 239 THOU/UL (ref 130–400)
POTASSIUM SERPL-SCNC: 4.6 MMOL/L (ref 3.5–5.1)
RBC # BLD AUTO: 3 MILL/UL (ref 4.7–6.1)
SODIUM SERPL-SCNC: 138 MMOL/L (ref 136–145)
WBC # BLD AUTO: 7.3 THOU/UL (ref 4.8–10.8)

## 2018-08-01 RX ADMIN — Medication SCH ML: at 20:39

## 2018-08-01 RX ADMIN — Medication SCH ML: at 10:45

## 2018-08-01 RX ADMIN — CEFTRIAXONE SCH MLS: 1 INJECTION, POWDER, FOR SOLUTION INTRAMUSCULAR; INTRAVENOUS at 04:18

## 2018-08-01 NOTE — HP
CODE STATUS:  FULL CODE.

 

PRIMARY CARE PHYSICIAN:  Bessie Peres D.O.

 

TIME OF EVALUATION:  1:30 a.m.

 

CHIEF COMPLAINT:  Nausea, fever.

 

HISTORY OF PRESENT ILLNESS:  This is a 78 years old patient with past medical history of recent admis
herber due to urinary tract infection last week, the patient went home and had been seen by Dr. Peres
 today in followup, he was found to be tachycardic, having fever, generalized weakness, patient repor
jose the symptoms were severe, feeling worse, likely treated for regular infection, no alleviating fac
tors.  Symptoms have worsened for the past 2 days.

 

REVIEW OF SYSTEMS:  Constitutional:  The patient had fever, chills, generalized weakness.  Respirator
y:  No cough, no sputum production or shortness of breath.  Cardiovascular:  No chest pain, palpitati
ons, or shortness of breath.  Gastrointestinal:  The patient has nausea, no vomiting, no diarrhea, lo
wer abdominal pain.  CNS:  No dizziness, headache, or feeling lightheaded.  Genitourinary:  No burnin
g on urination, increased frequency.  Extremities:  The patient has bilateral leg swelling.  All syst
ems were reviewed and negative except for the findings mentioned above.

 

PAST MEDICAL HISTORY:  Positive for recent urinary tract infection, hyperlipidemia, hypertension, kid
mervin stones, metastatic prostate cancer.

 

PAST SURGICAL HISTORY:  Appendectomy.

 

PSYCHIATRIC HISTORY:  No suicidal ideations, no previous psychiatric history.

 

SOCIAL HISTORY:  The patient drinks socially, rarely, no drugs.  No smoking history.

 

FAMILY HISTORY:  Reviewed and noncontributory for current presentation.

 

KNOWN ALLERGIES:  _____.

 

REPORTED MEDICATIONS:  Tramadol, tamsulosin, _____.

 

PHYSICAL EXAMINATION:

VITAL SIGNS:  On presentation, blood pressure 151/80 with heart rate 114, respiratory rate was 18, te
mperature 99 at some point temperature was 102.1, saturation 98 on 2 liters.

GENERAL APPEARANCE:  The patient is alert, oriented, not any acute distress.

HEENT:  Eyes:  Normal conjunctiva.  Moist oral mucosa.  Anicteric.

NECK:  No JVD.

RESPIRATORY:  Bilateral air entry.  No rales, no wheezing.  Symmetric expansion.

CARDIOVASCULAR:  Normal rate, regular rhythm.  No murmurs, no gallop.  Bilateral leg edema that is ch
ronic.

ABDOMEN:  Soft.  Normal bowel sounds.

MUSCULOSKELETAL:  Baseline range of motion and strength.  No tenderness.

SKIN:  Warm and intact.  No pallor, no rash, no redness.

NEUROLOGIC:  Baseline sensory.  No evidence of any new focal weakness.  Baseline speech.  Cranial ner
ves seem to be intact.

PSYCHIATRIC:  The patient is in good mood.  No anxiety, oriented, optimal judgement.

 

LABORATORY DATA:  Reviewed.  The patient has white count of 11, hemoglobin 11, MCV 85, platelet count
 302.  Chemistry:  Sodium 142, potassium 5, chloride 108, carbon dioxide 31, creatinine 2.41 when com
pared to previous visit the patient has creatinine of 1.2 on the previous admissions.  LFTs were norm
al.  Alkaline phosphatase 298, albumin 3.2.  UA was done.  The patient had a white count 11-20 in uri
ne.

 

ASSESSMENT AND PLAN:  The patient was placed in the hospital with the following medical problems:

1.  Acute kidney injury.  The patient has an increasing creatinine of 0.3 mg/dL, we will hydrate, it 
will be secondary to dehydration versus sepsis.

2.  Sepsis.  The patient has fever, tachycardia with heart rate 121, patient has received some fluid,
 has been started on antibiotics, we will monitor cultures, we will adjust the treatment as per cultu
re and sensitivity.

3.  Urinary tract infection, positive UA, likely source of sepsis, treatment as above.

4.  Uncontrolled high blood pressure.  The patient has a systolic 151 on presentation, we will reconc
ile home medications, we will adjust as needed.  We will not treat aggressively due to underlying sep
sis and risk for septic shock.

5.  History of prostate cancer, this can be followed outpatient.

6.  Normocytic anemia, likely secondary to underlying cancer, this can be treated as an outpatient.  
We will monitor CBC.

7.  Deep venous thrombosis prophylaxis.

## 2018-08-01 NOTE — PDOC.PN
- Subjective


Encounter Start Date: 08/01/18


Encounter Start Time: 11:35





Patient was seen in bed this AM. Patient was admitted for septicemia.  Patient 

was lying comfortably without any distress. Patient states that early in the AM 

he had some back pain however the backpain was alleviated with some pain 

medications. Patient denies fever, chills, headaches, Chest pain palpitations. 





- Objective


Resuscitation Status: 


 











Resuscitation Status           FULL:Full Resuscitation














MAR Reviewed: Yes


Vital Signs & Weight: 


 Vital Signs (12 hours)











  Temp Pulse Resp BP Pulse Ox


 


 08/01/18 16:21  98.5 F  91  18  137/70  100


 


 08/01/18 11:01  98.9 F  100  20  140/95 H  94 L


 


 08/01/18 10:37   90   


 


 08/01/18 08:25  99.6 F  90  16   94 L


 


 08/01/18 07:35  99.6 F  90  16  157/72 H  94 L








 Weight











Weight                         203 lb














I&O: 


 











 07/31/18 08/01/18 08/02/18





 06:59 06:59 06:59


 


Intake Total  250 


 


Balance  250 











Result Diagrams: 


 08/01/18 05:29





 08/01/18 05:29


EKG Reviewed by me: Yes (Sinus Tachycardiac)





Phys Exam





- Physical Examination


HEENT: PERRLA, moist MMs


Neck: no JVD


Respiratory: no wheezing, no rales, no rhonchi, clear to auscultation bilateral


Cardiovascular: no significant murmur, no rub


Tachycardic


Gastrointestinal: soft, non-tender, no distention, positive bowel sounds


Musculoskeletal: no edema, pulses present


Neurological: non-focal, normal sensation, moves all 4 limbs


Psychiatric: normal affect, A&O x 3


Skin: no rash





Dx/Plan


(1) Septicemia


Code(s): A41.9 - SEPSIS, UNSPECIFIED ORGANISM   Status: Acute   Comment: 

Patient has positive blood cultures. We will continue patient on antibiotics. 

infectious disease consulted. Cardiology consulted. Will follow up with 

cardiology if TTE is needed to rule out any valvular vegetations.    





(2) Prostate cancer metastatic to multiple sites


Code(s): C61 - MALIGNANT NEOPLASM OF PROSTATE   Status: Chronic   Comment: 

Consulted Oncologist. will follow up with oncologist for any further 

interventions.    





(3) KENAN (acute kidney injury)


Code(s): N17.9 - ACUTE KIDNEY FAILURE, UNSPECIFIED   Status: Acute   Comment: 

continue Gently hydration. will monitor Creatinine.    





(4) HTN (hypertension)


Code(s): I10 - ESSENTIAL (PRIMARY) HYPERTENSION   Status: Chronic   


Qualifiers: 


   Hypertension type: essential hypertension   Qualified Code(s): I10 - 

Essential (primary) hypertension   





- Plan


DVT proph w/SCDs





* .








Review of Systems





- Review of Systems


Musculoskeletal: Back Pain





- Medications/Allergies


Allergies/Adverse Reactions: 


 Allergies











Allergy/AdvReac Type Severity Reaction Status Date / Time


 


ACE Inhibitors AdvReac   Verified 08/01/18 02:15











Medications: 


 Current Medications





Acetaminophen (Tylenol)  650 mg PO Q4H PRN


   PRN Reason: Headache/Fever or Pain


   Last Admin: 08/01/18 10:36 Dose:  650 mg


Aspirin (Aspirin Chewable)  81 mg PO DAILY On license of [facility]


   Last Admin: 08/01/18 10:40 Dose:  81 mg


Bicalutamide (Casodex)  50 mg PO DAILY On license of [facility]


   Last Admin: 08/01/18 10:45 Dose:  50 mg


Bisacodyl (Dulcolax)  5 mg PO DAILY On license of [facility]


   Last Admin: 08/01/18 10:40 Dose:  5 mg


[facility]  [facility]


Enoxaparin Sodium (Lovenox)  40 mg SC 0900 On license of [facility]


   Last Admin: 08/01/18 11:01 Dose:  Not Given


Ferrous Sulfate (Feosol)  325 mg PO BID-[facility]


   Last Admin: 08/01/18 16:20 Dose:  325 mg


Finasteride (Proscar)  5 mg PO DAILY On license of [facility]


   Last Admin: 08/01/18 10:37 Dose:  5 mg


Ceftriaxone Sodium 1 gm/ (Sodium Chloride)  100 mls @ 200 mls/hr IVPB Q24HR On license of [facility]


   Last Admin: 08/01/18 04:18 Dose:  100 mls


Sodium Chloride (Normal Saline 0.9%)  1,000 mls @ 75 mls/hr IV .[id number] On license of [facility]


   Last Admin: 08/01/18 18:26 Dose:  1,000 mls


Ondansetron HCl (Zofran)  4 mg IVP Q6H PRN


   PRN Reason: Nausea/Vomiting


Pregabalin (Lyrica)  50 mg PO DAILY On license of [facility]


   Last Admin: 08/01/18 10:40 Dose:  50 mg


Sodium Chloride (Flush - Normal Saline)  10 ml IVF Q12HR On license of [facility]


   Last Admin: 08/01/18 10:45 Dose:  10 ml


Sodium Chloride (Flush - Normal Saline)  10 ml IVF PRN PRN


   PRN Reason: Saline Flush


Tamsulosin HCl (Flomax)  0.4 mg PO BID On license of [facility]


   Last Admin: 08/01/18 10:38 Dose:  0.4 mg


Tramadol HCl (Ultram)  50 mg PO Q6H PRN


   PRN Reason: Moderate to Severe Pain (6-10)


   Last Admin: 08/01/18 18:47 Dose:  50 mg

## 2018-08-01 NOTE — CON
DATE OF CONSULTATION:  08/01/2018

 

DATE OF ADMISSION:  08/01/2018

 

CARDIOLOGY CONSULT NOTE

 

INDICATION FOR CONSULTATION:  A 78-year-old gentleman with a short 9-second run of multifocal atrial 
tachycardia.  Otherwise, no significant cardiac history.

 

HISTORY OF PRESENT ILLNESS:  This very unfortunate, 78-year-old gentleman has had what appears to be 
sepsis.  He had a recent urinary tract infection in the past.  He came back in with worsening symptom
s and has been admitted to the hospital.  He has been placed on antibiotics.  He has had no previous 
cardiac history, but today, he had a 9-second run of what appeared to be multifocal atrial tachycardi
a or sinus tachycardia with PACs.  He was asymptomatic.  He has had no previous cardiac history that 
he is aware of.  He has had no echocardiogram or cardiac catheterizations or stress testing that he i
s aware of.  Unfortunately, this gentleman has had a history of metastatic prostate cancer.  He has n
ot been compliant apparently with the suggested treatment.  He also been complaining of back pain, wh
ich is felt to be due most likely from metastatic disease, possibly rather than due to spinal stenosi
s.  He has also been complaining of some lower extremity edema recently and does have obviously edema
 in the lower extremities.  Apparently, he tells me that he has had evaluations and there has been no
 indication that he has had any blood clots in his lower extremities, and he did have a venogram actu
ally about 2 weeks ago, did not show any evidence of lower extremity DVTs despite having the lower ex
tremity edema.  I did not see that he has had any echocardiogram in the past and uncertain of the marielena
ology of his edema.  He does unfortunately have renal insufficiency, which may be the etiology of the
 edema.  His creatinine is 2.29 and that appears to be some type of continuing worsening of renal ins
ufficiency since earlier this year.  He may need to be evaluated further from the renal standpoint.  
At this time from a cardiac standpoint, he does not have any complaints.  He does have a history of h
ypertension and hyperlipidemia, which appeared to be under good control at this time.  At least blood
 pressure is under good control at this time.

 

PAST MEDICAL HISTORY:  Significant for metastatic prostate cancer, history of nephrolithiasis.  He ha
s had hyperlipidemia and hypertension.  He has history of a possible spinal stenosis, noncompliance w
ith medication, and their suggestions and therapies.  He has also had an appendectomy.

 

SOCIAL HISTORY:  There is no significant history of alcohol or tobacco abuse.  He is still .

 

FAMILY HISTORY:  Noncontributory.

 

ALLERGIES:   He says he is allergic and has intolerance to ACE INHIBITORS.

 

MEDICATIONS:  Included losartan, tramadol, Colace, aspirin, and amlodipine.  Some of the lower extrem
ity edema may be due to some of the swelling, which can be associated with amlodipine that is why he 
had 10 mg a day, we would need to hold off on that medicine, and I will see whether or not he improve
s.

 

REVIEW OF SYSTEMS:  His 12-point review of systems, he denied any new HEENT complaints, visual change
s, hearing loss.  No pulmonary complaints such as asthma, emphysema, bronchitis, dyspnea on exertion,
 or shortness of breath.  He had no new GI complaints such as nausea, vomiting or diarrhea.  He had n
o  complaints actually except for the recent nephrolithiasis in which he passed a stone and also ha
d a urinary tract infection for which he is on antibiotics and apparently became uroseptic.  Musculos
keletal, he complains of the lower extremity edema, which has been present for several weeks now.  He
 also complains of low back pain.  Neurologically, no history of seizures or syncope.

 

PHYSICAL EXAMINATION:

GENERAL:  Reveals an elderly gentleman, in no acute distress at this time.

VITAL SIGNS:  Blood pressure is 137/70, he is afebrile, heart rate is 91 and shows a sinus rhythm, re
spiratory rate is 18.

HEENT EXAM:  Shows the head to be normocephalic and atraumatic.  Carotid pulses are present.  There w
ere no bruits.

CHEST:  Clear to auscultation.  No rales, rhonchi or wheezing appreciated.

CARDIOVASCULAR EXAM:  Reveals a regular rate and rhythm with a normal S1, S2.  No significant S3, S4 
noted.  There were no significant murmurs, heaves, thrills, bruits or rubs.

ABDOMINAL EXAM:  Shows obesity with positive bowel sounds.  No organomegaly or masses are noted.  No 
tenderness is noted.  Femoral pulses are present.

EXTREMITIES:  Showed 2+ lower extremity edema.  Pedal pulses are present, but difficult to palpate, a
nd very hard to palpate a right pedal pulse, left pedal pulse was present.

NEUROLOGIC:  Neurologically, I did not notice any gross focal motor deficits.  The patient does have 
difficulty getting in and out of bed due to his low back pain.

 

LABORATORY DATA:  Shows a hemoglobin of 9.6, WBC of 7.3.  His potassium is 4.6, creatinine 2.25 with 
a BUN of 54, blood sugar of 99.

 

IMPRESSION:

1.  A short episode of multifocal atrial tachycardia.  This is not of any significance at this time. 
 We will continue to monitor for any other arrhythmias.  We will obtain an echocardiogram for evaluat
ion of left ventricular systolic function.

2.  History of lower extremity edema, which may be associated with the amlodipine.  We will discontin
ue or hold this medication and manage his blood pressure with other medications.

3.  Metastatic prostate cancer, which we dealt with by the urologist.

4.  History of hypertension.  This is actually under relatively good control at this time, but we erma
l need to adjust his medications.

5.  Hyperlipidemia.  At this time, he is not on any medicines for the cholesterol.  His last LDL leve
l was 171, this was back in 2015.  I did not see more recent laboratory data on this gentleman.  At t
his time, we will be more than happy to continue to follow the patient with you.  Further recommendat
ions will depend on the echocardiogram.  I did not believe he is necessary to undergo stress testing 
at this time.  He has had no chest pain and appears to be still having some degree of urinary tract i
nfection or sepsis.

## 2018-08-02 LAB
ANION GAP SERPL CALC-SCNC: 16 MMOL/L (ref 10–20)
BUN SERPL-MCNC: 50 MG/DL (ref 8.4–25.7)
CALCIUM SERPL-MCNC: 7.8 MG/DL (ref 7.8–10.44)
CHLORIDE SERPL-SCNC: 114 MMOL/L (ref 98–107)
CO2 SERPL-SCNC: 15 MMOL/L (ref 23–31)
CREAT CL PREDICTED SERPL C-G-VRATE: 33 ML/MIN (ref 70–130)
GLUCOSE SERPL-MCNC: 96 MG/DL (ref 83–110)
HGB BLD-MCNC: 10.3 G/DL (ref 14–18)
MCH RBC QN AUTO: 31.6 PG (ref 27–31)
MCV RBC AUTO: 96.4 FL (ref 78–98)
PLATELET # BLD AUTO: 231 THOU/UL (ref 130–400)
POTASSIUM SERPL-SCNC: 4.4 MMOL/L (ref 3.5–5.1)
RBC # BLD AUTO: 3.25 MILL/UL (ref 4.7–6.1)
SODIUM SERPL-SCNC: 141 MMOL/L (ref 136–145)
WBC # BLD AUTO: 5.2 THOU/UL (ref 4.8–10.8)

## 2018-08-02 RX ADMIN — CEFTRIAXONE SCH MLS: 1 INJECTION, POWDER, FOR SOLUTION INTRAMUSCULAR; INTRAVENOUS at 03:00

## 2018-08-02 RX ADMIN — Medication SCH: at 09:00

## 2018-08-02 RX ADMIN — Medication SCH: at 20:20

## 2018-08-02 NOTE — CON
DATE OF CONSULTATION:  08/02/2018

 

REASON FOR CONSULTATION:  Bacteremia.

 

HISTORY OF PRESENT ILLNESS:  A 78-year-old with history of metastatic prostate cancer to bone, lungs 
as well as pelvic mets, on Casodex with prior admission for urinary tract infection.  The last admiss
ion was on 07/17/2018 and he came in because of lower extremity burning sensation.  A CT dissection p
rotocol showed multiple mets and left hydronephrosis from a known prostate cancer.  He had renal insu
fficiency as well, which improved with fluid resuscitation and the lower extremity burning sensation 
was felt to be secondary to neuropathy.  Urinalysis on 07/17/2018 showed 4-6 wbc's per high power fie
ld.  A culture on 07/17/2018 that was no growth at 36 hours.   Discharge medications included Flomax,
 Lyrica, finasteride, Casodex, aspirin, Norvasc and Ultram.  Now, he is readmitted with nausea, fever
, and tachycardia.  He was seen by his primary physician, Dr. Peres and referred for admission.  In
itial vital signs with blood pressure 150/80, pulse 114, temperature 99.9, respiratory rate 18, O2 sa
turation 98%.  He was alert and oriented and the exam showed no respiratory distress.  Breath sounds 
are clear.  Heart examination showed tachycardia, but no murmurs or gallop.  Extremity examination wa
s not remarkable.  Skin examination was not remarkable either.  Other findings included white cell co
unt 7.3, hemoglobin 9.6, MCV 95, platelets 235 with 90% neutrophils and a creatinine of 2.29 with bas
sapna in 03/2018 of 1.11.  At this time, we have 2 sets of blood cultures obtained on 07/31/2018 with
 Enterococcus faecalis.  Urine culture preliminary findings have not yet reported.  The cultures are 
still young and to be further worked up.  Imaging studies included this time a chest x-ray with cardi
omegaly and minor haziness.  There is a venogram from last admission which showed no evidence of deep
 vein thrombosis in either lower extremities.  Currently, Mr. Link is awake.  He denies having prost
ate cancer.  He seems to be in denial regarding that.  He does not know what current medications he i
s on and he refers everything to his wife and he always focuses on the neuropathic symptoms in lower 
extremities, which are probably related to the mets in the thoracic spine area and lumbar spine regio
n.  Otherwise, he denies any headaches, visual symptoms, sore throat, odynophagia or dysphagia.  No d
yspnea, no cough, no chest pain, no abdominal pain, no voiding issues.

 

PAST MEDICAL HISTORY:  Includes metastatic prostate cancer, hypertension and dyslipidemia.

 

PAST SURGICAL HISTORY:  Appendectomy and nephrolithiasis.  The prostate cancer involves lungs, pelvic
 area, lymph nodes and multiple bone areas including the spine.

 

MEDICATIONS:  Have been listed above.

 

ALLERGIES:  None.

 

SOCIAL HISTORY:  Never a smoker.  Lives with wife.  Ambulates with a cane.

 

FAMILY HISTORY:  Noncontributory.

 

CURRENT MEDICATIONS:  Include Tylenol, aspirin, Casodex, Dulcolax, Coreg, ceftriaxone, Lovenox, Feoso
l, Zofran, Lyrica.

 

PHYSICAL EXAMINATION:

VITAL SIGNS:  T-max 99.6, blood pressure 130/89, pulse 99, respirations 17, O2 sat 96%.

SKIN:  Not remarkable.  Patient has a peripheral IV access and he is voiding spontaneously.

HEENT:  Ocular movements are conjugate.  Oral cavity unremarkable.

NECK:  Supple.

LUNGS:  With symmetric clear breath sounds.

HEART:  S1, S2, regular rate without murmurs.

ABDOMEN:  Soft, not distended or tender.  No ascites.  Some spinal tenderness.

EXTREMITIES:  No joint inflammatory activity.  Pulses 1+ in dorsalis pedis.  No edema.  He is able to
 move all extremities on command.  Cap refill was normal.

NEUROLOGIC:  He is awake, oriented, follows commands.

 

LABORATORY DATA:  Latest white cell count 5.2, hemoglobin 10.3, platelets 231, sodium 141, creatinine
 2.44.

 

ASSESSMENT:

1.  Metastatic prostate cancer with apparent denial on the part of the patient.

2.  Likely urinary tract infection with bacteremia, possibly associated pyelonephritis.  Patient has 
evidence of previously documented left hydronephrosis and hydroureter with a mass within the urinary 
bladder.  The cancer itself has never been proven with the histology because patient declined by biop
sy, but all the clinical and laboratory findings are consistent with metastatic prostate cancer.  The
 hydroureteronephrosis was unchanged from 02/2018 and does not look he needs or he is eligible for st
enting of that left kidney outflow tract.  At this point, we will switch him to IV ampicillin or Unas
yn and eventual transition to oral Augmentin for discharge planning.  Treat for probably around 3-4 w
eeks.  Evidently, the overall prognosis is poor and consideration of palliative care is to be given a
lthough in the presence of denial on the part of the patient, this would be a difficult proposition.

## 2018-08-02 NOTE — PDOC.CTH
<Vane Burnett - Last Filed: 08/02/18 13:07>





Cardiology Progress Note





- Subjective





The pt seen and examined.  No overnight events.  No cardiac complaints.  Per 

the pt, his BLE edema is improved in AM and worse in PM. 





- Objective


 Vital Signs











  Temp Pulse Resp BP Pulse Ox


 


 08/02/18 12:05  98.3 F  99  16  134/81  98


 


 08/02/18 08:25  98.7 F  99  17  137/89  96


 


 08/02/18 08:00  98.7 F  99  17   96


 


 08/02/18 04:00  99 F  106 H  18  132/70 








 











Weight                         201 lb














 











 08/01/18 08/02/18 08/03/18





 06:59 06:59 06:59


 


Intake Total 250 1140 


 


Balance 250 1140 














- Physical Examination


General/Neuro: alert & oriented x3


Neck: no JVD present


Lungs: CTA


Heart: RRR


Abdomen: soft


Extremities: other: (1-2+ pitting BLE edema)





- Telemetry


Telemetry Rhythm: SR 





- Labs


Result Diagrams: 


 08/02/18 08:56





 08/02/18 08:56





- Assessment/Plan





1. s/p Atach - Remains in SR; 


2. UTI - on antibiotics; managed by PCP


3. HTN - stable


4. KENAN - slightly worsened.  cont. IV fluid.


5. BLE edema - better today per the pt's report; holding amlodipine


MAR reviewed








Review of Systems





- Review of Systems


Constitutional: reports: no symptoms reported


EENTM: reports: no symptoms reported


Respiratory: reports: no symptoms reported


Cardiac (ROS): reports: no symptoms reported


ABD/GI: reports: no symptoms reported





<BRITTNI Steward - Last Filed: 08/02/18 21:38>





Cardiology Progress Note





- Objective


 Vital Signs











  Temp Pulse Resp BP Pulse Ox


 


 08/02/18 16:20  98.8 F  99  12  154/89 H  97


 


 08/02/18 12:05  98.3 F  99  16  134/81  98








 











Weight                         201 lb














 











 08/01/18 08/02/18 08/03/18





 06:59 06:59 06:59


 


Intake Total 250 1140 1789


 


Balance 250 1140 1789














- Labs


Result Diagrams: 


 08/02/18 08:56





 08/02/18 08:56





- Assessment/Plan





Pt. seen and and eval. by me. I agree with the A/P by the NP. His BP is stable 

off the Norvasc and perhaps the edema will improve. Chest clear, RRR. 2+ lower 

leg edema but less than yesterday.

## 2018-08-02 NOTE — PDOC.PN
- Subjective


Encounter Start Date: 08/02/18


Encounter Start Time: 09:00





Seen in the Hospital for Septicemia. Patient seen and examined by bedside. 

Patient is lying in bed comfortably in no acute distress. States that he feels 

great this AM and he is now ready to go home. Patient is tolerating diet well 

and has no acute complaints. 





- Objective


Resuscitation Status: 


 











Resuscitation Status           FULL:Full Resuscitation














Vital Signs & Weight: 


 Vital Signs (12 hours)











  Temp Pulse Resp BP


 


 08/02/18 04:00  99 F  106 H  18  132/70








 Weight











Weight                         201 lb














I&O: 


 











 08/01/18 08/02/18 08/03/18





 06:59 06:59 06:59


 


Intake Total 250 1140 


 


Balance 250 1140 











Result Diagrams: 


 08/02/18 08:56





 08/02/18 08:56


EKG Reviewed by me: Yes (sinus Tachy)





Phys Exam





- Physical Examination


HEENT: PERRLA, moist MMs


Neck: no JVD, supple


Respiratory: no wheezing, no rales, no rhonchi, clear to auscultation bilateral


Cardiovascular: no significant murmur


Tachycardic


Gastrointestinal: soft, non-tender, no distention, positive bowel sounds


Musculoskeletal: edema present


Neurological: non-focal, normal sensation, moves all 4 limbs


Psychiatric: normal affect, A&O x 3


Skin: no rash





Dx/Plan


(1) Septicemia


Code(s): A41.9 - SEPSIS, UNSPECIFIED ORGANISM   Status: Acute   Comment: We 

will continue patient on antibiotics and switch to PO antibiotics per ID. Will 

follow up on AM labs. Will follow up on ID's recs. Patient is still 

tachycardic. Patient refused his Carvedilol in the AM. Will follow up with 

cardio regarding any further recs.     





(2) KENAN (acute kidney injury)


Code(s): N17.9 - ACUTE KIDNEY FAILURE, UNSPECIFIED   Status: Acute   Comment: 

Creatinine is trending down. Will continue gentle hydration. Consulted 

Nephrology. Will follow their recommendations at this time.    





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


Code(s): C61 - MALIGNANT NEOPLASM OF PROSTATE   Status: Chronic   Comment: 

Consulted Oncologist. will follow up with oncologist for any further 

interventions.    





(4) HTN (hypertension)


Code(s): I10 - ESSENTIAL (PRIMARY) HYPERTENSION   Status: Chronic   


Qualifiers: 


   Hypertension type: essential hypertension   Qualified Code(s): I10 - 

Essential (primary) hypertension   





(5) Hydronephrosis, left


Code(s): N13.30 - UNSPECIFIED HYDRONEPHROSIS   Status: Acute   Comment: sec to 

prostate cancer invasion of trigone and ureteral orifice on left   





(6) Spinal stenosis of lumbar region


Code(s): M48.061 - SPINAL STENOSIS, LUMBAR REGION WITHOUT NEUROGENIC CASA   

Status: Chronic   


Qualifiers: 


   Neurogenic claudication status: unspecified   Qualified Code(s): M48.061 - 

Spinal stenosis, lumbar region without neurogenic claudication   


Comment: L3-4   





- Plan


cont current plan of care





* .








Review of Systems





- Medications/Allergies


Allergies/Adverse Reactions: 


 Allergies











Allergy/AdvReac Type Severity Reaction Status Date / Time


 


ACE Inhibitors AdvReac   Verified 08/01/18 02:15











Medications: 


 Current Medications





Acetaminophen (Tylenol)  650 mg PO Q4H PRN


   PRN Reason: Headache/Fever or Pain


   Last Admin: 08/01/18 10:36 Dose:  650 mg


Aspirin (Aspirin Chewable)  81 mg PO DAILY [facility]


   Last Admin: 08/02/18 08:57 Dose:  81 mg


Bicalutamide (Casodex)  50 mg PO DAILY [facility]


   Last Admin: 08/02/18 09:09 Dose:  50 mg


Bisacodyl (Dulcolax)  5 mg PO DAILY [facility]


   Last Admin: 08/02/18 08:56 Dose:  5 mg


[facility]  [facility]


   Last Admin: 08/02/18 16:23 Dose:  Not Given


Enoxaparin Sodium (Lovenox)  40 mg SC 0900 [facility]


   Last Admin: 08/02/18 09:00 Dose:  Not Given


Ferrous Sulfate ([facility]osol)  [facility]


   Last Admin: 08/02/18 16:23 Dose:  Not Given


Finasteride (Proscar)  5 mg PO DAILY [facility]


   Last Admin: 08/02/18 08:56 Dose:  5 mg


Sodium Chloride (Normal Saline 0.9%)  1,000 mls @ 75 mls/hr IV .[id number] [facility]


   Last Admin: 08/02/18 04:13 Dose:  1,000 mls


Ampicillin Sodium 2 gm/ Sodium (Chloride)  100 mls @ 200 mls/hr IVPB Q12HR [facility]


Ondansetron HCl (Zofran)  4 mg IVP Q6H PRN


   PRN Reason: Nausea/Vomiting


Pregabalin (Lyrica)  50 mg PO DAILY [facility]


   Last Admin: 08/02/18 08:56 Dose:  50 mg


Sodium Chloride (Flush - Normal Saline)  10 ml IVF Q12HR [facility]


   Last Admin: 08/02/18 09:00 Dose:  Not Given


Sodium Chloride (Flush - Normal Saline)  10 ml IVF PRN PRN


   PRN Reason: Saline Flush


Tamsulosin HCl (Flomax)  0.4 mg PO BID [facility]


   Last Admin: 08/02/18 08:56 Dose:  0.4 mg


Tramadol HCl (Ultram)  50 mg PO Q6H PRN


   PRN Reason: Moderate to Severe Pain (6-10)


   Last Admin: 08/02/18 04:31 Dose:  50 mg

## 2018-08-02 NOTE — ULT
RENAL ULTRASOUND:

 

08/02/2018

 

PROVIDED CLINICAL HISTORY:

Acute kidney injury.

 

COMPARISON: 

07/16/2018

 

FINDINGS:

The right kidney measures approximately 11.8 x 6.1 x 7.9 cm and demonstrates no evidence for hydronep
hrosis.  There is a focal area of altered echogenicity involving the right kidney, in the mid portion
, anteriorly, measuring about 2.8 cm, but demonstrates no definite correlate on the prior CT examinat
ion and is of uncertain etiology and significance.

 

The left kidney measures approximately 14.6 x 7.2 x 8.2 cm and demonstrates moderate to severe hydron
ephrosis.  Simple appearing renal cysts are also seen.

 

The urinary bladder is conspicuously distended.  There is an echogenic mass in the urinary bladder, l
eft of midline, measuring about 2.9 cm.

 

IMPRESSION:

1.  Moderate to severe left hydronephrosis, presumably due to obstruction by a left-sided bladder mas
s.  Consider urology consultation.

 

2.  Nonspecific complex area in the right kidney, may be artifactual, given the lack of correlate on 
recent CT examination.  Consider CT followup as indicated.

 

POS: SOFIE

## 2018-08-02 NOTE — CON
DATE OF CONSULTATION:  08/02/2018

 

NEPHROLOGY CONSULTATION

 

REASON FOR CONSULTATION:  Elevated creatinine.

 

HISTORY OF PRESENT ILLNESS:  This is a very pleasant 78-year-old gentleman who presented to the [facility] with pain in his leg.  The patient was noted to have a creatinine of 2.4.  His last baseline was 
1.23 on 07/18/2018.  The patient also complained of nausea as well as fevers.  The patient denies hea
dache, numbness, tingling or weakness.

 

PAST MEDICAL HISTORY:  Significant for recurrent UTI, hyperlipidemia, hydronephrosis, kidney stone, m
etastatic breast cancer, history of appendectomy.

 

SOCIAL HISTORY:  No alcohol or IV drug use.

 

FAMILY HISTORY:  Negative for ESRD.

 

ALLERGIES:  Reviewed.

 

HOME MEDICATIONS:  List reviewed.

 

REVIEW OF SYSTEMS:  A 15-point review of systems was performed and was negative except for positives 
noted above.

GENERAL:  Weakness-

HEAD:  Headache-

NECK:  No swelling or lumps. 

NOSE:  No epistaxis or discharge.  

EYES:  No diplopia or pain.

RESPIRATORY:  Dyspnea-

CARDIOVASCULAR:  Chest pain-

GASTROINTESTINAL:  Nausea-

/GYN:  Hematuria-

MUSCULOSKELETAL:  No joint pain.

NEUROPSYCHIATIC SYSTEMS:  No suicidal ideation.  No ideation.

SKIN:  Denies any rash or ulcer.

 

PHYSICAL EXAMINATION:

GENERAL:  Patient is awake, alert.

VITAL SIGNS:  Afebrile, pulse 99, breathing 16, blood pressure 137/89.

HEAD/NECK:  Normocephalic.   Atraumatic.

EYES:  EOMI.  No deformity.

EARS:  Clear.  No ulcers.

NOSE:   Intact.  No lesions.

MOUTH:  Clear.  No discharge.

THROAT:  Clear.  No exudate.

LUNGS:   Clear.  No crackles.

CARDIAC:   S1, S2.  No rub.

ABDOMEN:   Benign.  BS+.

GENITALIA/RECTUM:  Andrew absent.

BACK/EXTREMITIES:  Edema 0+ Ulcer-

NEUROLOGICAL:  Alert and motor intact.                     

SKIN:  Rash- Bruise-  

LYMPHATICS:  Edema- Ulcer-

 

LABORATORY DATA:  Show creatinine 2.4, bicarbonate 15.

 

ASSESSMENT AND RECOMMENDATIONS:

1.  Acute kidney injury with chronic kidney disease, most likely possibly because of obstructive urop
athy, order imaging.  Continue hydration.

2.  Metabolic acidosis.  We will recheck lab.  Continue hydration.

3.  Anemia, stable.

4.  Medications based on glomerular filtration rate are appropriate.

5.  Hypertension, stable.  No indication for dialysis at this time.

## 2018-08-02 NOTE — CON
DATE OF CONSULTATION:  08/02/2018

 

REASON FOR CONSULTATION:  Metastatic prostate cancer.

 

HISTORY OF PRESENT ILLNESS:  Mr. Link is a 78-year-old  male who was recently discharged fr
om this facility on 07/19/2018 on a for bilateral lower extremity neuropathy.  He has a history of me
tastatic prostate cancer.  He was diagnosed back in February.  He saw Dr. Dye in March and the re
commendation was to begin Lupron and Zytiga.  Unfortunately, the patient did not follow up or start t
reatment.  He is taking Casodex prescribed by Urology.  He was seen by Dr. Edgar on his prior a
dmission because of his left hydronephrosis.  She offered a nephrostomy tube and possible stents, but
 he declined.  He was to follow up with our clinic today, but unfortunately was admitted for fever an
d chills.  He was started on IV antibiotics and given IV fluids.  His likely source of infection is a
 urinary tract infection.  The patient was seen at bedside with his spouse.  He is feeling much alexys
r.

 

PAST MEDICAL HISTORY:

1.  Metastatic prostate cancer.

2.  Left hydronephrosis.

3.  Hyperlipidemia.

4.  Hypertension.

5.  History of kidney stones.

6.  Spinal stenosis.

7.  Noncompliance.

 

PAST SURGICAL HISTORY:  Appendectomy.

 

ALLERGIES:  No known drug allergies.

 

HOME MEDICATIONS:

1.  Norvasc 10 mg daily.

2.  Aspirin 81 mg daily.

3.  Casodex 50 mg daily.

4.  Proscar 5 mg daily.

5.  Lyrica 50 mg daily.

6.  Flomax 0.4 mg daily.

7.  Tramadol p.r.n.

 

FAMILY HISTORY:  There is no history of malignancy.

 

SOCIAL HISTORY:  , has 1 child.  Lives with his spouse.  No alcohol, tobacco or illicit drug u
se.

 

REVIEW OF SYSTEMS:  Positive for fever and burning of his bilateral lower extremities, otherwise nega
tive.

 

PHYSICAL EXAMINATION:

VITAL SIGNS:  Temperature is 98.7, pulse is 99, respiratory rate 17, BP is 137/89.  He is 96% on room
 air.

GENERAL:  Well-developed, well-nourished male in no acute distress.

HEENT:  Normocephalic, atraumatic.  Pupils equal and reactive to light.

NECK:  Supple.

CARDIOVASCULAR:  Regular rate and rhythm.

LUNGS:  Clear.

ABDOMEN:  Obese.  Bowel sounds are positive.

EXTREMITIES: He has got 2+ bilateral lower extremity edema.

SKIN:  There is no rash.

HEMATOLOGIC:  No petechia or purpura.

NEUROLOGIC:  Nonfocal.

PSYCHIATRIC:  The patient is alert and oriented and answers questions appropriately.

 

PERTINENT LABORATORY AND X-RAYS:  Current WBCs are 5.2, hemoglobin 10.3, hematocrit 31.3, platelet co
unt is 231,000, 90% neutrophils, 5% lymphocytes.  Sodium is 141, potassium 4.4, chloride 114, CO2 is 
15, BUN is 50, creatinine 2.44, calcium is 7.8.  

 

Chest x-ray showed mild cardiomegaly.

 

IMPRESSION:

1.  Metastatic prostate cancer.

2.  Longstanding left hydronephrosis.

3.  Acute on chronic kidney injury.

4.  Urinary tract infection.

5.  History of noncompliance.

 

DISCUSSION:  The patient states he has been taking his Casodex for the last week and a half.  He was 
due to see Dr. Dye today.

 

The plan will be to continue Casodex until he is able to see Dr. Dye as his appointment was moved
 to next week.  At that time we will discuss chemotherapy and treatment of his prostate cancer demetris amaya

 

Thank you for the consult.  We will follow his hospital course remotely.

## 2018-08-03 VITALS — SYSTOLIC BLOOD PRESSURE: 158 MMHG | DIASTOLIC BLOOD PRESSURE: 81 MMHG | TEMPERATURE: 99 F

## 2018-08-03 LAB
ALBUMIN SERPL BCG-MCNC: 2.5 G/DL (ref 3.4–4.8)
ALP SERPL-CCNC: 240 U/L (ref 40–150)
ALT SERPL W P-5'-P-CCNC: 22 U/L (ref 8–55)
ANION GAP SERPL CALC-SCNC: 16 MMOL/L (ref 10–20)
AST SERPL-CCNC: 15 U/L (ref 5–34)
BILIRUB SERPL-MCNC: 0.3 MG/DL (ref 0.2–1.2)
BUN SERPL-MCNC: 43 MG/DL (ref 8.4–25.7)
CALCIUM SERPL-MCNC: 7.7 MG/DL (ref 7.8–10.44)
CHLORIDE SERPL-SCNC: 117 MMOL/L (ref 98–107)
CO2 SERPL-SCNC: 14 MMOL/L (ref 23–31)
CREAT CL PREDICTED SERPL C-G-VRATE: 40 ML/MIN (ref 70–130)
GLOBULIN SER CALC-MCNC: 2.9 G/DL (ref 2.4–3.5)
GLUCOSE SERPL-MCNC: 106 MG/DL (ref 83–110)
HGB BLD-MCNC: 10 G/DL (ref 14–18)
MCH RBC QN AUTO: 32 PG (ref 27–31)
MCV RBC AUTO: 95.4 FL (ref 78–98)
MDIFF COMPLETE?: YES
PLATELET # BLD AUTO: 242 THOU/UL (ref 130–400)
POTASSIUM SERPL-SCNC: 4.2 MMOL/L (ref 3.5–5.1)
RBC # BLD AUTO: 3.11 MILL/UL (ref 4.7–6.1)
SODIUM SERPL-SCNC: 143 MMOL/L (ref 136–145)
WBC # BLD AUTO: 5.2 THOU/UL (ref 4.8–10.8)

## 2018-08-03 RX ADMIN — Medication SCH: at 09:42

## 2018-08-03 NOTE — PRG
DATE OF SERVICE:  08/03/2018

 

SUBJECTIVE:  This is a 78-year-old gentleman being seen for acute kidney 
injury.  The patient denies any nausea, vomiting or chest pain.

 

PHYSICAL EXAMINATION:

GENERAL:  Patient is awake, alert.

VITAL SIGNS:  Afebrile, pulse 80, breathing at 16, blood pressure 159/81.

HEAD/NECK:  Normocephalic.   Atraumatic.

EYES:  EOMI.  No deformity.

EARS:  Clear.  No ulcers.

NOSE:   Intact.  No lesions.

MOUTH:  Clear.  No discharge.

THROAT:  Clear.  No exudate.

LUNGS:   Clear.  No crackles.

CARDIAC:   S1, S2.  No rub.

ABDOMEN:   Benign.  BS+.

GENITALIA/RECTUM:  Andrew absent.

BACK/EXTREMITIES:  Edema 0+ Ulcer-

NEUROLOGICAL:  Alert and motor intact.                     

SKIN:  Rash- Bruise-  

LYMPHATICS:  Edema- Ulcer-

 

LABORATORY DATA:  Show hemoglobin 10, creatinine 2.4.

 

ASSESSMENT AND PLAN:

1.  Acute kidney injury with chronic kidney disease stage 3, stable.

2.  Hypertension, stable.

3.  Anemia, stable.

4.  Metabolic acidosis.  Start sodium bicarbonate b.i.d.  The patient will 
follow up to see me in 1-2 weeks.

 

MTDD

## 2018-08-03 NOTE — PDOC.CTH
<Vane Burnett - Last Filed: 08/03/18 09:24>





Cardiology Progress Note





- Subjective





The pt seen and examined.  No overnight events.  No cardiac complaints.  

Explained and answers questions about his BLE edemas. 





- Objective


 Vital Signs











  Temp Pulse Resp BP Pulse Ox


 


 08/03/18 07:40  99.6 F  113 H  16  159/81 H  94 L


 


 08/03/18 04:00  98.5 F  115 H  18  154/75 H  95








 











Weight                         201 lb














 











 08/02/18 08/03/18 08/04/18





 06:59 06:59 06:59


 


Intake Total 1140 2929 


 


Output Total  300 


 


Balance 1140 2629 














- Physical Examination


General/Neuro: alert & oriented x3


Neck: no JVD present


Lungs: CTA


Heart: RRR


Abdomen: soft


Extremities: other: (2+ pitting BLE edema;)





- Telemetry


Telemetry Rhythm: ST 110s





- Labs


Result Diagrams: 


 08/03/18 04:51





 08/03/18 04:51





- Assessment/Plan





1. s/p Atach - Remains in SR; However, HR has been elevated.  strongly 

recommended the pt to administer Coreg for his VS. 


2. UTI - on antibiotics; managed by PCP


3. HTN - stable


4. KENAN - slightly worsened.  cont. IV fluid.


5. BLE edema - better today per the pt's report; holding amlodipine; Instructed 

to watch fluid and salt intakes, wear compression stockings, and raise his BLE 

while resting. 


MAR reviewed








Review of Systems





- Review of Systems


Constitutional: reports: no symptoms reported


EENTM: reports: no symptoms reported


Respiratory: reports: no symptoms reported


Cardiac (ROS): reports: no symptoms reported


ABD/GI: reports: no symptoms reported


: reports: no symptoms reported


Musculoskeletal: reports: no symptoms reported





<BRITTNI Steward - Last Filed: 08/03/18 13:57>





Cardiology Progress Note





- Objective


 Vital Signs











  Temp Pulse Resp BP Pulse Ox


 


 08/03/18 11:52  98.5 F  88  20  161/78 H 


 


 08/03/18 07:40  99.6 F  113 H  16  159/81 H  94 L


 


 08/03/18 04:00  98.5 F  115 H  18  154/75 H  95








 











Weight                         201 lb














 











 08/02/18 08/03/18 08/04/18





 06:59 06:59 06:59


 


Intake Total 1140 2929 


 


Output Total  300 


 


Balance 1140 2629 














- Labs


Result Diagrams: 


 08/03/18 04:51





 08/03/18 04:51





- Assessment/Plan





Pt. seen and eval.by me. I agree with the A/P by the NP. BP still on the high 

side but can be managed as an outpt. The edema should have improved off Norvasc 

if this was the culprit. the edema is slightly improved. The renal issues may 

be the etiology of the edema. Diuretics may further decrease the renal 

function. From a cardiac standpoint he is stable for d/c.

## 2018-08-03 NOTE — PDOC.PN
- Subjective


Encounter Start Date: 08/03/18


Encounter Start Time: 08:15





- Objective


Resuscitation Status: 


 











Resuscitation Status           FULL:Full Resuscitation














MAR Reviewed: Yes


Vital Signs & Weight: 


 Vital Signs (12 hours)











  Temp Pulse Resp BP Pulse Ox


 


 08/03/18 07:40  99.6 F  113 H  16  159/81 H  94 L


 


 08/03/18 04:00  98.5 F  115 H  18  154/75 H  95








 Weight











Weight                         201 lb














I&O: 


 











 08/02/18 08/03/18 08/04/18





 06:59 06:59 06:59


 


Intake Total 1140 2929 


 


Output Total  300 


 


Balance 1140 2629 











Result Diagrams: 


 08/03/18 04:51





 08/03/18 04:51





Dx/Plan


(1) Septicemia


Code(s): A41.9 - SEPSIS, UNSPECIFIED ORGANISM   Status: Acute   Comment: We 

will continue patient on antibiotics and switch to PO antibiotics per ID. Will 

follow up on AM labs. Will follow up on ID's recs. Patient is still 

tachycardic. Patient refused his Carvedilol in the AM. Will follow up with 

cardio regarding any further recs.     





(2) KENAN (acute kidney injury)


Code(s): N17.9 - ACUTE KIDNEY FAILURE, UNSPECIFIED   Status: Acute   Comment: 

Creatinine is trending down. Will continue gentle hydration. Consulted 

Nephrology. Will follow their recommendations at this time.    





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


Code(s): C61 - MALIGNANT NEOPLASM OF PROSTATE   Status: Chronic   Comment: 

Consulted Oncologist. will follow up with oncologist for any further 

interventions.    





(4) HTN (hypertension)


Code(s): I10 - ESSENTIAL (PRIMARY) HYPERTENSION   Status: Chronic   


Qualifiers: 


   Hypertension type: essential hypertension   Qualified Code(s): I10 - 

Essential (primary) hypertension   





(5) Hydronephrosis, left


Code(s): N13.30 - UNSPECIFIED HYDRONEPHROSIS   Status: Acute   Comment: sec to 

prostate cancer invasion of trigone and ureteral orifice on left   





(6) Spinal stenosis of lumbar region


Code(s): M48.061 - SPINAL STENOSIS, LUMBAR REGION WITHOUT NEUROGENIC CASA   

Status: Chronic   


Qualifiers: 


   Neurogenic claudication status: unspecified   Qualified Code(s): M48.061 - 

Spinal stenosis, lumbar region without neurogenic claudication   


Comment: L3-4   





- Plan





* .








Review of Systems





- Medications/Allergies


Allergies/Adverse Reactions: 


 Allergies











Allergy/AdvReac Type Severity Reaction Status Date / Time


 


ACE Inhibitors AdvReac   Verified 08/01/18 02:15











Medications: 


 Current Medications





Acetaminophen (Tylenol)  650 mg PO Q4H PRN


   PRN Reason: Headache/Fever or Pain


   Last Admin: 08/01/18 10:36 Dose:  650 mg


Aspirin (Aspirin Chewable)  81 mg PO DAILY [facility]


   Last Admin: 08/02/18 08:57 Dose:  81 mg


Bicalutamide (Casodex)  50 mg PO DAILY [facility]


   Last Admin: 08/02/18 09:09 Dose:  50 mg


Bisacodyl (Dulcolax)  5 mg PO DAILY [facility]


   Last Admin: 08/02/18 08:56 Dose:  5 mg


[facility]


   Last Admin: 08/02/18 16:23 Dose:  Not Given


Enoxaparin Sodium (Lovenox)  40 mg SC 0900 [facility]


   Last Admin: 08/02/18 09:00 Dose:  Not Given


[facility]errous [facility])  [facility]


   Last Admin: 08/02/18 16:23 Dose:  Not Given


Finasteride (Proscar)  5 mg PO DAILY [facility]


   Last Admin: 08/02/18 08:56 Dose:  5 mg


Sodium Chloride (Normal Saline 0.9%)  1,000 mls @ 75 mls/hr IV .[id number] [facility]


   Last Admin: 08/02/18 20:20 Dose:  1,000 mls


Ampicillin Sodium 2 gm/ Sodium (Chloride)  100 mls @ 200 mls/hr IVPB Q12HR [facility]


   Last Admin: 08/02/18 20:20 Dose:  100 mls


Ondansetron HCl (Zofran)  4 mg IVP Q6H PRN


   PRN Reason: Nausea/Vomiting


Pregabalin (Lyrica)  50 mg PO DAILY [facility]


   Last Admin: 08/02/18 08:56 Dose:  50 mg


Sodium Chloride (Flush - Normal Saline)  10 ml IVF Q12HR [facility]


   Last Admin: 08/02/18 20:20 Dose:  Not Given


Sodium Chloride (Flush - Normal Saline)  10 ml IVF PRN PRN


   PRN Reason: Saline Flush


Tamsulosin HCl (Flomax)  0.4 mg PO BID [facility]


   Last Admin: 08/02/18 20:19 Dose:  0.4 mg


Tramadol HCl (Ultram)  50 mg PO Q6H PRN


   PRN Reason: Moderate to Severe Pain (6-10)


   Last Admin: 08/02/18 04:31 Dose:  50 mg

## 2018-08-03 NOTE — DIS
PROGRESS NOTE

 

SUBJECTIVE:  The patient was seen and examined by me this morning with his wife at bedside.  The hermelinda
ent states that he is feeling much better.  The patient does not have any complaints at this time.  T
he patient denies any shortness of breath, nausea, vomiting, fevers, chills, abdominal pain.

 

PHYSICAL EXAMINATION:

VITAL SIGNS:  Temperature is 99, heart rate is 104, respiratory rate of 20, O2 sat of 95 on room air,
 blood pressure is 158/81.

GENERAL APPEARANCE:  The patient is lying in bed comfortably, not in acute distress.

HEENT:  Normocephalic, atraumatic.  Pupils are equally round and reactive to light.  Extraocular brody
rs are intact.  No scleral icterus noted.

CARDIAC:  Positive S1, S2.  Slightly tachycardic.

LUNGS:  Clear to auscultation bilaterally.  No wheezes, no rales, no rhonchi is appreciated.

ABDOMEN:  Obese abdomen.  Nontender, nondistended.  Positive bowel sounds in all quadrants.  No ecchy
mosis noted.

EXTREMITIES:  1+ pitting edema noted.  5/5 upper extremity and lower extremity strength.

 

 

DISCHARGE SUMMARY

 

DISCHARGE DIAGNOSES:

1.  Prostate cancer, metastatic to multiple sites.

2.  Acute on chronic kidney injury.

3.  Hydronephrosis of left.

4.  Hypertension.

5.  Tachycardia.

6.  Septicemia.

7.  Spinal stenosis of lumbar region.

 

CONSULTANTS:  Urology, Nephrology, Infectious Disease, Oncology, Palliative Care.

 

HOSPITAL COURSE:  This is a 78-year-old patient with past medical history of prostate cancer and urin
sonya retention, who came into the hospital with urinary retention and some symptoms of weakness.  The 
patient was admitted and blood cultures were obtained, which showed the patient has septicemia.  The 
patient was then started on antibiotics.  ID was consulted.  The patient was noted to have acute on c
hronic kidney injury.  Therefore, ultrasound of the bladder and kidneys were obtained, which showed h
ydronephrosis of the left kidney.  Nephrology was consulted.  Urology was consulted.  Per Urology, th
ere was nothing to be done at this time due to the patient's metastatic cancer.  Oncology was also co
nsulted.  The patient has been in denial with having prostate cancer.  At this time, Oncology has bryant
eduled an appointment for the patient to follow up with Dr. Dye' group.  Infectious Disease has a
djusted the patient's antibiotics to Unasyn.  The patient is going to be discharged on p.o. antibioti
cs of ampicillin, and the patient was told to follow up with his oncologist.

 

LABORATORY DATA:  On admission, the patient's white blood cell count was 7.3, which trended down to 5
.2; hemoglobin was 9.6, which was stable at 10.  The patient's platelets were normal around 230s.  Th
e patient's sodium was stable at 140s.  The patient had a creatinine of 2.29, which went up to 2.44 a
nd started trending to 2.04.

 

IMAGING:  The patient was found to have left-sided hydronephrosis, which was presumably due to obstru
ction of the left-sided bladder due to mass.

 

DISPOSITION:  The patient has been advised to follow up with nephrologist as outpatient in a week and
 has been instructed to follow up with oncologist.  The patient has been instructed to take his medic
ation accordingly.

 

MEDICATION LIST:  Please refer to discharge medications on the electronic medical records.

 

The patient's discharge encounter was approximately 35 minutes.

## 2020-08-11 ENCOUNTER — HOSPITAL ENCOUNTER (EMERGENCY)
Dept: HOSPITAL 57 - BURERS | Age: 80
Discharge: ANOTHER HOSPITAL | End: 2020-08-11
Payer: MEDICARE

## 2020-08-11 DIAGNOSIS — Z79.899: ICD-10-CM

## 2020-08-11 DIAGNOSIS — I11.0: ICD-10-CM

## 2020-08-11 DIAGNOSIS — I21.4: Primary | ICD-10-CM

## 2020-08-11 DIAGNOSIS — E78.5: ICD-10-CM

## 2020-08-11 DIAGNOSIS — I50.9: ICD-10-CM
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
## 2020-08-11 DIAGNOSIS — S89.92XA: ICD-10-CM

## 2020-08-11 LAB
ALBUMIN SERPL BCG-MCNC: 3.4 G/DL (ref 3.4–4.8)
ALP SERPL-CCNC: 369 U/L (ref 40–110)
ALT SERPL W P-5'-P-CCNC: 16 U/L (ref 8–55)
ANION GAP SERPL CALC-SCNC: 14 MMOL/L (ref 10–20)
AST SERPL-CCNC: 15 U/L (ref 5–34)
BASOPHILS # BLD AUTO: 0.1 THOU/UL (ref 0–0.2)
BASOPHILS NFR BLD AUTO: 1.6 % (ref 0–1)
BILIRUB SERPL-MCNC: 0.5 MG/DL (ref 0.2–1.2)
BUN SERPL-MCNC: 24 MG/DL (ref 8.4–25.7)
CALCIUM SERPL-MCNC: 8.2 MG/DL (ref 7.8–10.44)
CHLORIDE SERPL-SCNC: 111 MMOL/L (ref 98–107)
CK MB SERPL-MCNC: 4.1 NG/ML (ref 0–6.6)
CO2 SERPL-SCNC: 22 MMOL/L (ref 23–31)
CREAT CL PREDICTED SERPL C-G-VRATE: 0 ML/MIN (ref 70–130)
EOSINOPHIL # BLD AUTO: 0.1 THOU/UL (ref 0–0.7)
EOSINOPHIL NFR BLD AUTO: 1.8 % (ref 0–10)
GLOBULIN SER CALC-MCNC: 2.5 G/DL (ref 2.4–3.5)
GLUCOSE SERPL-MCNC: 153 MG/DL (ref 83–110)
HGB BLD-MCNC: 9.1 G/DL (ref 14–18)
LYMPHOCYTES # BLD AUTO: 0.6 THOU/UL (ref 1.2–3.4)
LYMPHOCYTES NFR BLD AUTO: 10.9 % (ref 21–51)
MCH RBC QN AUTO: 25.3 PG (ref 27–31)
MCV RBC AUTO: 89 FL (ref 78–98)
MDIFF COMPLETE?: YES
MONOCYTES # BLD AUTO: 0.4 THOU/UL (ref 0.11–0.59)
MONOCYTES NFR BLD AUTO: 7.6 % (ref 0–10)
NEUTROPHILS # BLD AUTO: 4 THOU/UL (ref 1.4–6.5)
NEUTROPHILS NFR BLD AUTO: 78.1 % (ref 42–75)
PLATELET # BLD AUTO: 226 THOU/UL (ref 130–400)
POTASSIUM SERPL-SCNC: 4.1 MMOL/L (ref 3.5–5.1)
RBC # BLD AUTO: 3.6 MILL/UL (ref 4.7–6.1)
SODIUM SERPL-SCNC: 143 MMOL/L (ref 136–145)
WBC # BLD AUTO: 5.2 THOU/UL (ref 4.8–10.8)

## 2020-08-11 PROCEDURE — 94760 N-INVAS EAR/PLS OXIMETRY 1: CPT

## 2020-08-11 PROCEDURE — 93005 ELECTROCARDIOGRAM TRACING: CPT

## 2020-08-11 PROCEDURE — 71045 X-RAY EXAM CHEST 1 VIEW: CPT

## 2020-08-11 PROCEDURE — 82553 CREATINE MB FRACTION: CPT

## 2020-08-11 PROCEDURE — 96374 THER/PROPH/DIAG INJ IV PUSH: CPT

## 2020-08-11 PROCEDURE — 85025 COMPLETE CBC W/AUTO DIFF WBC: CPT

## 2020-08-11 PROCEDURE — 84484 ASSAY OF TROPONIN QUANT: CPT

## 2020-08-11 PROCEDURE — 84443 ASSAY THYROID STIM HORMONE: CPT

## 2020-08-11 PROCEDURE — 83605 ASSAY OF LACTIC ACID: CPT

## 2020-08-11 PROCEDURE — 87040 BLOOD CULTURE FOR BACTERIA: CPT

## 2020-08-11 PROCEDURE — 80053 COMPREHEN METABOLIC PANEL: CPT

## 2020-08-11 PROCEDURE — 83880 ASSAY OF NATRIURETIC PEPTIDE: CPT

## 2020-08-11 NOTE — RAD
LEFT KNEE 4 VIEWS:

 

Date:  )8/11/2020

 

Swelling and reticulation of soft tissues is seen around the knee, especially laterally. There is kinga
rly complete loss of the lateral joint space, even more so than was present on a 04/17/2018 film of t
he leg. No clear fracture was noted. There was a small bulbous projection from the medial tibial plat
eau, but looking at all other views, this does not appear to be definitely fractured. There is no lar
ge joint effusion. Mild patellofemoral osteophytes represent. There is very slight lateral subluxatio
n of the femoral condyles on the tibial plateau, probably a longstanding problem. This does not appea
r much different than the 2018 film of the leg. 

 

IMPRESSION: 

Further narrowing of the lateral joint space and surrounding soft tissue swelling. 

 

 

POS: HOME

## 2020-08-11 NOTE — RAD
PORTABLE CHEST:

 

Date:  08/11/2020

 

An AP portable film at 0828 hours is compared with the 07/31/2018 study. 

 

Mild cardiomegaly is about the same as before. Vessels are not clearly congested. There is a little s
treaking in the right base that may be a minor infiltrate or even some fluid. The left lung is relati
vely clear for a portable film and the patient being turned slightly. 

 

IMPRESSION: 

Mild cardiomegaly without clear congestive change. Minor right basilar streaking and/or fluid. 

 

 

POS: HOME

## 2020-09-07 ENCOUNTER — HOSPITAL ENCOUNTER (INPATIENT)
Dept: HOSPITAL 92 - ERS | Age: 80
LOS: 3 days | Discharge: HOME | DRG: 280 | End: 2020-09-10
Attending: INTERNAL MEDICINE | Admitting: INTERNAL MEDICINE
Payer: MEDICARE

## 2020-09-07 VITALS — BODY MASS INDEX: 34.7 KG/M2

## 2020-09-07 DIAGNOSIS — D63.1: ICD-10-CM

## 2020-09-07 DIAGNOSIS — I25.10: ICD-10-CM

## 2020-09-07 DIAGNOSIS — I25.2: ICD-10-CM

## 2020-09-07 DIAGNOSIS — Z79.899: ICD-10-CM

## 2020-09-07 DIAGNOSIS — Z90.49: ICD-10-CM

## 2020-09-07 DIAGNOSIS — I13.0: Primary | ICD-10-CM

## 2020-09-07 DIAGNOSIS — Z88.8: ICD-10-CM

## 2020-09-07 DIAGNOSIS — N18.3: ICD-10-CM

## 2020-09-07 DIAGNOSIS — Z79.82: ICD-10-CM

## 2020-09-07 DIAGNOSIS — N17.9: ICD-10-CM

## 2020-09-07 DIAGNOSIS — Z20.828: ICD-10-CM

## 2020-09-07 DIAGNOSIS — E78.5: ICD-10-CM

## 2020-09-07 DIAGNOSIS — I21.A1: ICD-10-CM

## 2020-09-07 DIAGNOSIS — I50.23: ICD-10-CM

## 2020-09-07 DIAGNOSIS — N20.0: ICD-10-CM

## 2020-09-07 DIAGNOSIS — C61: ICD-10-CM

## 2020-09-07 LAB
ALBUMIN SERPL BCG-MCNC: 3.3 G/DL (ref 3.4–4.8)
ALP SERPL-CCNC: 314 U/L (ref 40–110)
ALT SERPL W P-5'-P-CCNC: 7 U/L (ref 8–55)
ANION GAP SERPL CALC-SCNC: 17 MMOL/L (ref 10–20)
AST SERPL-CCNC: 8 U/L (ref 5–34)
BASOPHILS # BLD AUTO: 0 THOU/UL (ref 0–0.2)
BASOPHILS NFR BLD AUTO: 0.7 % (ref 0–1)
BILIRUB SERPL-MCNC: 0.3 MG/DL (ref 0.2–1.2)
BUN SERPL-MCNC: 25 MG/DL (ref 8.4–25.7)
CALCIUM SERPL-MCNC: 7.9 MG/DL (ref 7.8–10.44)
CHLORIDE SERPL-SCNC: 104 MMOL/L (ref 98–107)
CK MB SERPL-MCNC: 1.2 NG/ML (ref 0–6.6)
CO2 SERPL-SCNC: 26 MMOL/L (ref 23–31)
CREAT CL PREDICTED SERPL C-G-VRATE: 0 ML/MIN (ref 70–130)
EOSINOPHIL # BLD AUTO: 0.1 THOU/UL (ref 0–0.7)
EOSINOPHIL NFR BLD AUTO: 2.1 % (ref 0–10)
GLOBULIN SER CALC-MCNC: 2.3 G/DL (ref 2.4–3.5)
GLUCOSE SERPL-MCNC: 95 MG/DL (ref 83–110)
HGB BLD-MCNC: 8.7 G/DL (ref 14–18)
LYMPHOCYTES # BLD: 1 THOU/UL (ref 1.2–3.4)
LYMPHOCYTES NFR BLD AUTO: 18.5 % (ref 21–51)
MCH RBC QN AUTO: 28.1 PG (ref 27–31)
MCV RBC AUTO: 88.9 FL (ref 78–98)
MONOCYTES # BLD AUTO: 0.5 THOU/UL (ref 0.11–0.59)
MONOCYTES NFR BLD AUTO: 10.2 % (ref 0–10)
NEUTROPHILS # BLD AUTO: 3.5 THOU/UL (ref 1.4–6.5)
NEUTROPHILS NFR BLD AUTO: 68.4 % (ref 42–75)
PLATELET # BLD AUTO: 250 THOU/UL (ref 130–400)
POTASSIUM SERPL-SCNC: 3.6 MMOL/L (ref 3.5–5.1)
RBC # BLD AUTO: 3.08 MILL/UL (ref 4.7–6.1)
SODIUM SERPL-SCNC: 143 MMOL/L (ref 136–145)
TROPONIN I SERPL DL<=0.01 NG/ML-MCNC: 0.1 NG/ML (ref ?–0.03)
WBC # BLD AUTO: 5.1 THOU/UL (ref 4.8–10.8)

## 2020-09-07 PROCEDURE — 80048 BASIC METABOLIC PNL TOTAL CA: CPT

## 2020-09-07 PROCEDURE — 71045 X-RAY EXAM CHEST 1 VIEW: CPT

## 2020-09-07 PROCEDURE — 36415 COLL VENOUS BLD VENIPUNCTURE: CPT

## 2020-09-07 PROCEDURE — 85025 COMPLETE CBC W/AUTO DIFF WBC: CPT

## 2020-09-07 PROCEDURE — 84484 ASSAY OF TROPONIN QUANT: CPT

## 2020-09-07 PROCEDURE — U0003 INFECTIOUS AGENT DETECTION BY NUCLEIC ACID (DNA OR RNA); SEVERE ACUTE RESPIRATORY SYNDROME CORONAVIRUS 2 (SARS-COV-2) (CORONAVIRUS DISEASE [COVID-19]), AMPLIFIED PROBE TECHNIQUE, MAKING USE OF HIGH THROUGHPUT TECHNOLOGIES AS DESCRIBED BY CMS-2020-01-R: HCPCS

## 2020-09-07 PROCEDURE — 93005 ELECTROCARDIOGRAM TRACING: CPT

## 2020-09-07 PROCEDURE — 83880 ASSAY OF NATRIURETIC PEPTIDE: CPT

## 2020-09-07 PROCEDURE — 80053 COMPREHEN METABOLIC PANEL: CPT

## 2020-09-07 PROCEDURE — 87635 SARS-COV-2 COVID-19 AMP PRB: CPT

## 2020-09-07 PROCEDURE — 82553 CREATINE MB FRACTION: CPT

## 2020-09-07 PROCEDURE — 96374 THER/PROPH/DIAG INJ IV PUSH: CPT

## 2020-09-07 PROCEDURE — 97139 UNLISTED THERAPEUTIC PX: CPT

## 2020-09-07 NOTE — RAD
PORTABLE CHEST:

 

History: Bilateral lower extremity edema, dyspnea. 

 

Comparison: 8-

 

FINDINGS: 

Heart size is enlarged. There are atherosclerotic changes within a tortuous aorta. Pulmonary vessels 
appear mildly engorged. 

 

IMPRESSION: 

Cardiomegaly with mild pulmonary vascular engorgement. Some blunting to the costophrenic angles could
 indicate the presence of small effusions. 

 

POS: OFF

## 2020-09-07 NOTE — HP
CHIEF COMPLAINT:  Shortness of breath.



HISTORY OF PRESENT ILLNESS:  Mr. Link is a 80-year-old male with past medical

history of congestive heart failure with ejection fraction of 40%, who presented to

the emergency room with worsening shortness of breath and leg edema.  The patient

saw his DrMiguel __________ and was told that his BNP is markedly elevated above his

baseline.  He had much fluid retention and had 20 mg of Lasix p.o. daily.  Has not

changed any of his medications.  The patient denies fevers, or chills.  Workup in

the emergency room, the patient was short of breath, orthopneic, significant leg

edema, BNP is 1132, which is markedly elevated comparing to his baseline.  Troponin

is 0.08.  The patient was given IV Lasix.  The patient is being admitted to hospital

for further management. 



PAST MEDICAL HISTORY:  

1. Hyperlipidemia.

2. Hypertension.

3. Metastatic prostate cancer.

4. Kidney stones.

5. Congestive heart failure.

6. NSTEMI.



PAST SURGICAL HISTORY:  Appendectomy.



SOCIAL HISTORY:  The patient drinks socially.  No smoking history.



FAMILY HISTORY:  Unknown family history.



HOME MEDICATIONS:  Please see home medication reconciliation form for updated

medications. 



ALLERGIES:  THE PATIENT IS ALLERGIC TO ACE INHIBITORS, AMITRIPTYLINE, VALSARTAN,

HYDROCHLOROTHIAZIDE. 



REVIEW OF SYSTEMS:  Review of 14 systems negative except what is mentioned in

history of present illness. 



PHYSICAL EXAMINATION:

GENERAL:  The patient is awake, alert, more orthopneic, in moderate distress. 

VITAL SIGNS:  Blood pressure 122/64, pulse is 93, respiratory rate is 20,

temperature is 99.3. 

HEAD AND NECK:  Normocephalic, atraumatic. 

NECK:  Supple. 

CHEST:  Fair bilateral air entry. 

HEART:  S1, S2, regular. 

ABDOMEN:  Soft, nontender.  Bowel sounds present. 

NEUROLOGIC:  Awake, alert, oriented.  No focal deficit. 

PSYCH:  Unable to assess. 

EXTREMITIES:  Significant for pedal edema 3+.



LABORATORY DATA:  Hemoglobin 8.7, platelets 250.  The BUN is 25, creatinine 1.4.

BNP is 1132.  The troponin 0.08.  Chest x-ray shows pulmonary vascular congestion.

As per EEG, cannot find the report at this time. 



ASSESSMENT:  

1. Acute on chronic congestive heart failure, systolic.

2. History of myocardial infarction/coronary artery disease.

3. Prostate cancer, metastatic.

4. Hyperlipidemia.

5. Hypertension.



PLAN:  

1. Admit.

2. Tele monitor.

3. IV diuresis.

4. Serial troponins.

5. Consider Cardiology consultation in a.m. for evaluation further recommendations.

6. Reconcile home medications.

7. DVT prophylaxis as appropriate.

8. Expected length of stay, 2 midnights.







Job ID:  [id number]

## 2020-09-08 LAB — TROPONIN I SERPL DL<=0.01 NG/ML-MCNC: 0.1 NG/ML (ref ?–0.03)

## 2020-09-08 RX ADMIN — ASPIRIN 81 MG CHEWABLE TABLET SCH MG: 81 TABLET CHEWABLE at 10:09

## 2020-09-08 RX ADMIN — CYANOCOBALAMIN TAB 1000 MCG SCH MCG: 1000 TAB at 10:10

## 2020-09-08 RX ADMIN — Medication SCH ML: at 20:43

## 2020-09-08 NOTE — PDOC.HOSPP
- Subjective


Encounter Date: 09/08/20


Subjective: 





Doing ok.  Still has edema of the legs, but says it is improved with the 

diuresis.  Voiding well.  Reports he has had orthopnea at home.  Says he 

ambulates at home with a wheelchair. 





- Objective


Vital Signs & Weight: 


 Vital Signs (12 hours)











  Temp Pulse Resp BP Pulse Ox


 


 09/08/20 11:45  98.6 F  95  17  125/59 L  99


 


 09/08/20 08:00  98.9 F  104 H  18  138/74  100


 


 09/08/20 03:40  98.4 F  97  20  120/77  99








 Weight











Weight                         226 lb 12.8 oz














I&O: 


 











 09/07/20 09/08/20 09/09/20





 06:59 06:59 06:59


 


Intake Total  120 


 


Output Total  175 


 


Balance  -55 











Result Diagrams: 


 09/07/20 17:44





 09/07/20 17:44





Hospitalist ROS





- Medication


Medications: 


Active Medications











Generic Name Dose Route Start Last Admin





  Trade Name Freq  PRN Reason Stop Dose Admin


 


Aspirin  81 mg  09/08/20 09:00  09/08/20 10:09





  Aspirin Chewable  PO   81 mg





  DAILY AMANDA   Administration





     





     





     





     


 


Bicalutamide  50 mg  09/08/20 09:00  09/08/20 10:09





  Casodex  PO   50 mg





  DAILY AMANDA   Administration





     





     





     





     


 


Carvedilol  3.125 mg  09/08/20 09:00  09/08/20 10:09





  Coreg  PO   3.125 mg





  BID AMANDA   Administration





     





     





     





     


 


Cyanocobalamin  1,000 mcg  09/08/20 09:00  09/08/20 10:10





  Vitamin B-12  PO   1,000 mcg





  DAILY AMANDA   Administration





     





     





     





     


 


Enoxaparin Sodium  30 mg  09/08/20 09:00  09/08/20 10:10





  Lovenox  SC   30 mg





  0900 AMANDA   Administration





     





     





     





     


 


Furosemide  40 mg  09/08/20 06:00  09/08/20 13:00





  Lasix  SLOW IVP   40 mg





  0600,1400 AMANDA   Administration





     





     





     





     


 


Pantoprazole Sodium  40 mg  09/08/20 09:00  09/08/20 10:10





  Protonix  PO   40 mg





  DAILY AMANDA   Administration





     





     





     





     


 


Tamsulosin HCl  0.4 mg  09/08/20 09:00  09/08/20 10:11





  Flomax  PO   0.4 mg





  BID AMANDA   Administration





     





     





     





     














- Exam


General Appearance: NAD, awake alert


Heart: RRR, no murmur, no gallops, no rubs, normal peripheral pulses


Respiratory: CTAB, no wheezes, no rales, no ronchi, normal chest expansion, no 

tachypnea, normal percussion


Gastrointestinal: soft, non-tender, non-distended, normal bowel sounds, no 

palpable masses, no hepatomegaly, no splenomegaly, no bruit


Extremities: 2+ LE edema


Skin: normal turgor


Musculoskeletal: generalized weakness


Psychiatric: normal affect, normal behavior, A&O x 3





Hosp A/P


(1) Acute on chronic systolic (congestive) heart failure


Code(s): I50.23 - ACUTE ON CHRONIC SYSTOLIC (CONGESTIVE) HEART FAILURE   Status

: Acute   





(2) CKD (chronic kidney disease), stage III


Code(s): N18.3 - CHRONIC KIDNEY DISEASE, STAGE 3 (MODERATE)   Status: Acute   





(3) Leg edema


Code(s): R60.0 - LOCALIZED EDEMA   Status: Acute   





(4) HTN (hypertension)


Code(s): I10 - ESSENTIAL (PRIMARY) HYPERTENSION   Status: Chronic   


Qualifiers: 


   Hypertension type: essential hypertension   Qualified Code(s): I10 - 

Essential (primary) hypertension   





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


Code(s): C61 - MALIGNANT NEOPLASM OF PROSTATE   Status: Chronic   





- Plan





CHF:


Echo last month with EF 40-45%.  Systolic failure.  Allergic to ACEI, ARB.  On 

BB.  Continue IV Lasix.  Consult cardiology.





CKD stage III:


Stable at his baseline.





Lower extremity edema:


Secondary to CHF.  Improving with diuresis.





Metastatic prostate cancer:


History of metastases to the bone and the spine.  Continue Casodex.





Hypertension:


Treated with the Coreg.

## 2020-09-08 NOTE — CON
DATE OF CONSULTATION:  



HISTORY:  Randy Link is a 80-year-old white male with recent admission for

systolic heart failure.  He was first seen by Dr. Steward in August 2018, he had 
short

episode of multifocal atrial tachycardia and some lower extremity edema.  He was

most recently admitted on August 11, 2020, with increased shortness of breath as

well as increased peripheral edema.  He underwent Cardiolite testing, which was

negative for ischemia during that admission.  He was diuresed with improvement 
in

his lower extremity edema and dyspnea.  He was discharged on furosemide 40 
b.i.d.

according to the discharge summary.  Echo ejection fraction was 40% to 45%.  He 
has

been having increasing shortness of breath, PND, and significantly increased leg

edema and came back to the emergency room.  He denies any chest discomfort or 
fever. 



PAST MEDICAL HISTORY:  Hypertension, hyperlipidemia, metastatic prostate cancer,

nephrolithiasis, chronic systolic heart failure. 



OPERATION:  Appendectomy.



SOCIAL HISTORY:  He does not smoke or drink.



HOME MEDICATIONS:  

1. Aspirin 81 daily.

2. Carvedilol 3.125 b.i.d.

3. Casodex 50 daily.

4. Protonix 40 daily.

5. Furosemide 40 b.i.d.

6. Flomax 0.4 b.i.d.

7. Vitamin B12, 1000 mcg daily.



ALLERGIES:  VALSARTAN AND ACE INHIBITORS CAUSE HIVES, AMITRIPTYLINE, AND

HYDROCHLOROTHIAZIDE. 



REVIEW OF SYSTEMS:  A 10-point review of systems is otherwise unremarkable.



PHYSICAL EXAMINATION:

VITAL SIGNS:  Blood pressure 106/66, pulse of 88. 

HEENT:  PERRL. 

NECK:  Supple. 

CHEST:  Reveals crackles at the bases. 

CARDIOVASCULAR:  S1 and S2 are normal without any S3, S4, or murmurs. 

ABDOMEN:  Obese.  Normal bowel sounds.  No tenderness. 

EXTREMITIES:  Revealed 2 to 3+ edema to the mid thigh. 

NEUROLOGIC:  Grossly intact. 

SKIN:  Warm and dry.



LABORATORY DATA:  EKG reveals normal sinus rhythm with anterolateral 
downsloping ST

segments V3 through V5.  Chest x-ray revealed cardiomegaly with mild pulmonary

vascular engorgement and possible small effusions.  Hemoglobin 8.7, hematocrit 
27.4,

white count 5,100, and platelets 250,000.  Sodium 143, potassium 3.6, chloride 
104,

carbon dioxide 26, BUN 25, creatinine 1.41.  BNP 1230.5.  Troponin-I is up to 
0.104. 



IMPRESSION:  

1. Acute on chronic systolic heart failure, last ejection fraction of 40% to 45%
.

2. Chronic kidney disease.

3. Normal Cardiolite on last admission 3 weeks ago.  However, on that study, his

ejection fraction was 31%. 

4. Anterolateral ischemia on EKG.  I am unable to use the MUSE System and that 
will

not come up on the computer.  Therefore, old EKGs will be requested. 

5. Non-ST-elevation myocardial infarction, type 2.

6. Hypertension.

7. Hypercholesterolemia.

8. Anemia.

9. Metastatic prostate cancer.

10. Nephrolithiasis.



RECOMMENDATIONS:  The patient currently is being treated with IV diuresis.  
Strict

I's and O's will be ordered.  Also daily BMPs will be obtained with his chronic

kidney disease.  Old EKGs have been requested. 







Job ID:  [id number]



MTDD

## 2020-09-09 LAB
ANION GAP SERPL CALC-SCNC: 16 MMOL/L (ref 10–20)
BUN SERPL-MCNC: 25 MG/DL (ref 8.4–25.7)
CALCIUM SERPL-MCNC: 8.1 MG/DL (ref 7.8–10.44)
CHLORIDE SERPL-SCNC: 104 MMOL/L (ref 98–107)
CO2 SERPL-SCNC: 27 MMOL/L (ref 23–31)
CREAT CL PREDICTED SERPL C-G-VRATE: 60 ML/MIN (ref 70–130)
GLUCOSE SERPL-MCNC: 105 MG/DL (ref 83–110)
POTASSIUM SERPL-SCNC: 3.5 MMOL/L (ref 3.5–5.1)
SODIUM SERPL-SCNC: 143 MMOL/L (ref 136–145)

## 2020-09-09 RX ADMIN — CYANOCOBALAMIN TAB 1000 MCG SCH MCG: 1000 TAB at 09:25

## 2020-09-09 RX ADMIN — ASPIRIN 81 MG CHEWABLE TABLET SCH MG: 81 TABLET CHEWABLE at 09:22

## 2020-09-09 RX ADMIN — Medication SCH ML: at 10:33

## 2020-09-09 RX ADMIN — Medication SCH ML: at 22:43

## 2020-09-09 NOTE — PDOC.CPN
- Subjective


Date: 09/09/20


Time: 15:24


Interval history: 





The pt seen and examined.  No overnight events.  No cardiac complaints





- Objective


Allergies/Adverse Reactions: 


 Allergies











Allergy/AdvReac Type Severity Reaction Status Date / Time


 


amitriptyline Allergy  Confusion Verified 09/07/20 22:24


 


hydrochlorothiazide Allergy  Hives Verified 09/07/20 22:24


 


valsartan Allergy  Hives Verified 09/07/20 22:24


 


ACE Inhibitors AdvReac  Hives Verified 09/07/20 22:24











Visit Medications: 


 Current Medications





Acetaminophen/Codeine Phosphate (Tylenol #3)  1 tab PO Q12H PRN


   PRN Reason: Pain


Aspirin (Aspirin Chewable)  81 mg PO DAILY [facility]


   Last Admin: 09/09/20 09:22 Dose:  81 mg


Bicalutamide (Casodex)  50 mg PO DAILY [facility]


   Last Admin: 09/09/20 09:26 Dose:  50 mg


Carvedilol (Coreg)  3.125 mg PO BID [facility]


   Last Admin: 09/09/20 10:33 Dose:  3.125 mg


Cyanocobalamin (Vitamin B-12)  1,000 mcg PO DAILY [facility]


   Last Admin: 09/09/20 09:25 Dose:  1,000 mcg


Enoxaparin Sodium (Lovenox)  30 mg SC 0900 [facility]


   Last Admin: 09/09/20 09:23 Dose:  30 mg


Furosemide (Lasix)  40 mg SLOW IVP 0600,1400 [facility]


   Last Admin: 09/09/20 14:51 Dose:  40 mg


Pantoprazole Sodium (Protonix)  40 mg PO DAILY [facility]


   Last Admin: 09/09/20 09:26 Dose:  40 mg


Sodium Chloride (Flush - Normal Saline)  10 ml IVF Q12HR [facility]


   Last Admin: 09/09/20 10:33 Dose:  10 ml


Sodium Chloride (Flush - Normal Saline)  10 ml IVF PRN PRN


   PRN Reason: Saline Flush


   Last Admin: 09/09/20 06:26 Dose:  10 ml


Tamsulosin HCl (Flomax)  0.4 mg PO BID [facility]


   Last Admin: 09/09/20 09:22 Dose:  0.4 mg








Vital Signs & Weight: 


 Vital Signs











  Temp Pulse Pulse Pulse Resp BP BP


 


 09/09/20 11:16  98.3 F  85    16  


 


 09/09/20 09:30    76  87   108/56 L  126/74


 


 09/09/20 09:22       


 


 09/09/20 09:10  98.4 F  84    18  


 


 09/09/20 03:40  98.7 F  92    18  














  BP BP Pulse Ox


 


 09/09/20 11:16  121/74   98


 


 09/09/20 09:30   


 


 09/09/20 09:22    97


 


 09/09/20 09:10   109/58 L  97


 


 09/09/20 03:40  129/74   97








 











Weight                         224 lb 9.6 oz

















- Physical Exam


General: alert & oriented x3


HEENT: mucus membranes moist


Neck: supple neck


Cardiac: regular rate and rhythm, S1/S2


Lungs: decreased breath sounds


Neuro: cranial nerve 2-12 intact


Extremities: other: (2+ BLE pitting edema with midly erythema)





- Labs


Result Diagrams: 


 09/07/20 17:44





 09/09/20 03:38


 Troponin/CKMB











CK-MB (CK-2)  1.2 ng/mL (0-6.6)   09/07/20  17:44    


 


Troponin I  0.098 ng/mL (< 0.028)  H  09/07/20  23:41    














- Telemetry


Sinus rhythms and dysrhythmias: sinus rhythm





- Assessment/Plan


Assessment/Plan: 





1. Acute on chronic Systolic HF with EF 40-45% - on Coreg, IV Lasix 40mg BID 

which will be changed to PO BID; not on ACE/ARB due to Allergy


2. KENAN on CKD stage 3 - unchanged 


3. HTN - stable with current med 


4. BLE edema - 


5. Anemia - unchanged


6. Prostate Cancer metastatic to multiple sites including to bones and spine - 

on Casodex.


7. hx of multiple falls - 


MAR reviewed





Pt. seen and eval. by me. I agree with the A/P by the NP.  Continue gentle 

diuresis. Chest : bibasilar rales. RRR. Probably home in 1-2 days.

## 2020-09-09 NOTE — PDOC.HOSPP
- Subjective


Encounter Date: 09/09/20


Subjective: 





Feels well.  Breathing well.  Edema is improving.  Still voiding well. 





- Objective


Vital Signs & Weight: 


 Vital Signs (12 hours)











  Temp Pulse Resp BP Pulse Ox


 


 09/09/20 15:36  99.1 F  85  17  119/73  99


 


 09/09/20 11:16  98.3 F  85  16  121/74  98








 Weight











Weight                         224 lb 9.6 oz














I&O: 


 











 09/08/20 09/09/20 09/10/20





 06:59 06:59 06:59


 


Intake Total 120 1200 


 


Output Total 175 1415 550


 


Balance -55 -215 -550











Result Diagrams: 


 09/07/20 17:44





 09/09/20 03:38





Hospitalist ROS





- Medication


Medications: 


Active Medications











Generic Name Dose Route Start Last Admin





  Trade Name Freq  PRN Reason Stop Dose Admin


 


Aspirin  81 mg  09/08/20 09:00  09/09/20 09:22





  Aspirin Chewable  PO   81 mg





  DAILY AMANDA   Administration





     





     





     





     


 


Bicalutamide  50 mg  09/08/20 09:00  09/09/20 09:26





  Casodex  PO   50 mg





  DAILY AMANDA   Administration





     





     





     





     


 


Carvedilol  3.125 mg  09/08/20 09:00  09/09/20 22:43





  Coreg  PO   3.125 mg





  BID AMANDA   Administration





     





     





     





     


 


Cyanocobalamin  1,000 mcg  09/08/20 09:00  09/09/20 09:25





  Vitamin B-12  PO   1,000 mcg





  DAILY AMANDA   Administration





     





     





     





     


 


Enoxaparin Sodium  30 mg  09/08/20 09:00  09/09/20 09:23





  Lovenox  SC   30 mg





  0900 AMANDA   Administration





     





     





     





     


 


Furosemide  40 mg  09/08/20 06:00  09/09/20 14:51





  Lasix  SLOW IVP  09/09/20 23:59  40 mg





  0600,1400 AMANDA   Administration





     





     





     





     


 


Pantoprazole Sodium  40 mg  09/08/20 09:00  09/09/20 09:26





  Protonix  PO   40 mg





  DAILY AMANDA   Administration





     





     





     





     


 


Sodium Chloride  10 ml  09/08/20 21:00  09/09/20 22:43





  Flush - Normal Saline  IVF   10 ml





  Q12HR AMANDA   Administration





     





     





     





     


 


Sodium Chloride  10 ml  09/08/20 09:07  09/09/20 06:26





  Flush - Normal Saline  IVF   10 ml





  PRN PRN   Administration





  Saline Flush   





     





     





     


 


Tamsulosin HCl  0.4 mg  09/08/20 09:00  09/09/20 22:43





  Flomax  PO   0.4 mg





  BID AMANDA   Administration





     





     





     





     














- Exam


General Appearance: NAD, awake alert


Eye: PERRL, anicteric sclera


ENT: normocephalic atraumatic, no oropharyngeal lesions, moist mucosa


Neck: supple, symmetric, no JVD, no thyromegaly, no lymphadenopathy, no carotid 

bruit


Heart: RRR, no murmur, no gallops, no rubs, normal peripheral pulses


Respiratory: rales (Bases, minimal.)


Gastrointestinal: soft, non-tender, non-distended, normal bowel sounds, no 

palpable masses, no hepatomegaly, no splenomegaly, no bruit


Extremities: 1+ LE edema


Neurological: cranial nerve grossly intact, normal sensation to touch, no 

weakness, no focal deficits, no new deficit


Musculoskeletal: normal tone


Psychiatric: normal affect, normal behavior, A&O x 3





Hosp A/P


(1) Acute on chronic systolic (congestive) heart failure


Code(s): I50.23 - ACUTE ON CHRONIC SYSTOLIC (CONGESTIVE) HEART FAILURE   Status

: Acute   





(2) CKD (chronic kidney disease), stage III


Code(s): N18.3 - CHRONIC KIDNEY DISEASE, STAGE 3 (MODERATE)   Status: Acute   





(3) Leg edema


Code(s): R60.0 - LOCALIZED EDEMA   Status: Acute   





(4) HTN (hypertension)


Code(s): I10 - ESSENTIAL (PRIMARY) HYPERTENSION   Status: Chronic   


Qualifiers: 


   Hypertension type: essential hypertension   Qualified Code(s): I10 - 

Essential (primary) hypertension   





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


Code(s): C61 - MALIGNANT NEOPLASM OF PROSTATE   Status: Chronic   





- Plan





CHF:


Echo last month with EF 40-45%.  Systolic failure.  Allergic to ACEI, ARB.  On 

BB.  Continue IV Lasix.  Much improved.  Cards consult appreciated. 





CKD stage III:


Stable at his baseline.





Lower extremity edema:


Secondary to CHF.  Improving with diuresis.





Metastatic prostate cancer:


History of metastases to the bone and the spine.  Continue Casodex.





Hypertension:


Treated with the Coreg.

## 2020-09-10 VITALS — TEMPERATURE: 97.2 F

## 2020-09-10 VITALS — SYSTOLIC BLOOD PRESSURE: 112 MMHG | DIASTOLIC BLOOD PRESSURE: 58 MMHG

## 2020-09-10 LAB
ANION GAP SERPL CALC-SCNC: 15 MMOL/L (ref 10–20)
BUN SERPL-MCNC: 24 MG/DL (ref 8.4–25.7)
CALCIUM SERPL-MCNC: 8.1 MG/DL (ref 7.8–10.44)
CHLORIDE SERPL-SCNC: 103 MMOL/L (ref 98–107)
CO2 SERPL-SCNC: 26 MMOL/L (ref 23–31)
CREAT CL PREDICTED SERPL C-G-VRATE: 62 ML/MIN (ref 70–130)
GLUCOSE SERPL-MCNC: 99 MG/DL (ref 83–110)
POTASSIUM SERPL-SCNC: 3.3 MMOL/L (ref 3.5–5.1)
SODIUM SERPL-SCNC: 141 MMOL/L (ref 136–145)

## 2020-09-10 RX ADMIN — Medication SCH ML: at 10:09

## 2020-09-10 RX ADMIN — CYANOCOBALAMIN TAB 1000 MCG SCH MCG: 1000 TAB at 08:45

## 2020-09-10 RX ADMIN — ASPIRIN 81 MG CHEWABLE TABLET SCH MG: 81 TABLET CHEWABLE at 08:44

## 2020-09-10 NOTE — PDOC.CPN
- Subjective


Date: 09/10/20


Time: 11:00





- Review of Systems


General: reports: fatigue.  denies: fever/chills, weight/appetite/sleep changes

, night sweats


Respiratory: reports: shortness of breath, exercise intolerance.  denies: cough

, congestion


Cardiovascular: reports: edema.  denies: chest pain, palpitation, paroxysmal 

nocturnal dyspnea, orthopnea


Gastrointestinal: denies: nausea, vomiting, diarrhea, constipation, abd pain, 

GI bleeding


Musculoskeletal: reports: swelling (mild edema.).  denies: pain, tenderness, 

stiffness, arthritis/arthralgias


Neurological: denies: numbness, syncope, seizure, weakness





- Objective


Allergies/Adverse Reactions: 


 Allergies











Allergy/AdvReac Type Severity Reaction Status Date / Time


 


amitriptyline Allergy  Confusion Verified 09/07/20 22:24


 


hydrochlorothiazide Allergy  Hives Verified 09/07/20 22:24


 


valsartan Allergy  Hives Verified 09/07/20 22:24


 


ACE Inhibitors AdvReac  Hives Verified 09/07/20 22:24











Visit Medications: 


 Current Medications





Acetaminophen/Codeine Phosphate (Tylenol #3)  1 tab PO Q12H PRN


   PRN Reason: Pain


Aspirin (Aspirin Chewable)  81 mg PO DAILY [facility]


   Last Admin: 09/10/20 08:44 Dose:  81 mg


Bicalutamide (Casodex)  50 mg PO DAILY [facility]


   Last Admin: 09/10/20 08:49 Dose:  50 mg


Carvedilol (Coreg)  3.125 mg PO BID [facility]


   Last Admin: 09/10/20 08:45 Dose:  3.125 mg


Cyanocobalamin (Vitamin B-12)  1,000 mcg PO DAILY [facility]


   Last Admin: 09/10/20 08:45 Dose:  1,000 mcg


Enoxaparin Sodium (Lovenox)  30 mg SC 0900 [facility]


   Last Admin: 09/10/20 08:45 Dose:  30 mg


Furosemide (Lasix)  40 mg PO 0900,1400 [facility]


   Last Admin: 09/10/20 08:45 Dose:  40 mg


Pantoprazole Sodium (Protonix)  40 mg PO DAILY [facility]


   Last Admin: 09/10/20 08:45 Dose:  40 mg


Sodium Chloride (Flush - Normal Saline)  10 ml IVF Q12HR [facility]


   Last Admin: 09/10/20 10:09 Dose:  10 ml


Sodium Chloride (Flush - Normal Saline)  10 ml IVF PRN PRN


   PRN Reason: Saline Flush


   Last Admin: 09/09/20 06:26 Dose:  10 ml


Tamsulosin HCl (Flomax)  0.4 mg PO BID AMANDA


   Last Admin: 09/10/20 08:45 Dose:  0.4 mg








Vital Signs & Weight: 


 Vital Signs











  Temp Pulse Resp BP BP Pulse Ox


 


 09/10/20 08:45       98


 


 09/10/20 08:32  97.5 F L  95  18  118/68   98


 


 09/10/20 07:09       94 L


 


 09/10/20 04:40  98.9 F  92  18   125/68  94 L








 











Weight                         218 lb 4.8 oz

















- Physical Exam


Neck: no JVD/HJR


Cardiac: regular rate and rhythm


Lungs: no wheezes, bibasilar rales (rales are minimal and improved.)


Abdomen: soft, non-tender


Extremities: 1+ LE edema


Musculoskeletal: normal range of motion





- Labs


Result Diagrams: 


 09/07/20 17:44





 09/10/20 04:36


 Troponin/CKMB











CK-MB (CK-2)  1.2 ng/mL (0-6.6)   09/07/20  17:44    


 


Troponin I  0.098 ng/mL (< 0.028)  H  09/07/20  23:41    














- Telemetry


Sinus rhythms and dysrhythmias: sinus rhythm





- Assessment/Plan


Assessment/Plan: 





1. Acute on chronic Systolic HF with EF 40-45% - on Coreg, IV Lasix 40mg BID  

PO ; not on ACE/ARB due to Allergy


2. KENAN on CKD stage 3 - unchanged 


3. HTN - stable with current med 


4. BLE edema -mild 


5. Anemia - unchanged


6. Prostate Cancer metastatic to multiple sites including to bones and spine - 

on Casodex.


7. hx of multiple falls - 


MAR reviewed








He feels better. Wants to go home.

## 2020-09-12 NOTE — EKG
Test Reason : 

Blood Pressure : ***/*** mmHG

Vent. Rate : 088 BPM     Atrial Rate : 088 BPM

   P-R Int : 152 ms          QRS Dur : 082 ms

    QT Int : 350 ms       P-R-T Axes : 010 014 -22 degrees

   QTc Int : 423 ms

 

Sinus rhythm with Premature atrial complexes





Abnormal ECG

Since 11-AUG-2020

Confirmed by OLIVERIO AGUILERA ([id number]),  MARY PLAZA ([id number]) on 9/12/2020 4:41:56 PM

 

Referred By:             Confirmed By:OLIVERIO AGUILERA

## 2020-11-04 ENCOUNTER — HOSPITAL ENCOUNTER (OUTPATIENT)
Dept: HOSPITAL 57 - BURERS | Age: 80
Setting detail: OBSERVATION
LOS: 6 days | Discharge: ANOTHER HOSPITAL | End: 2020-11-10
Payer: MEDICARE

## 2020-11-04 VITALS — BODY MASS INDEX: 33.4 KG/M2

## 2020-11-04 DIAGNOSIS — R33.8: ICD-10-CM

## 2020-11-04 DIAGNOSIS — M79.661: ICD-10-CM

## 2020-11-04 DIAGNOSIS — M79.662: ICD-10-CM

## 2020-11-04 DIAGNOSIS — R53.1: ICD-10-CM

## 2020-11-04 DIAGNOSIS — C61: Primary | ICD-10-CM

## 2020-11-04 DIAGNOSIS — C79.51: ICD-10-CM

## 2020-11-04 DIAGNOSIS — Z79.899: ICD-10-CM

## 2020-11-04 DIAGNOSIS — N17.9: ICD-10-CM

## 2020-11-04 DIAGNOSIS — E78.5: ICD-10-CM

## 2020-11-04 DIAGNOSIS — G95.29: ICD-10-CM

## 2020-11-04 DIAGNOSIS — I25.10: ICD-10-CM

## 2020-11-04 DIAGNOSIS — M48.061: ICD-10-CM

## 2020-11-04 DIAGNOSIS — Z88.8: ICD-10-CM

## 2020-11-04 DIAGNOSIS — R50.9: ICD-10-CM

## 2020-11-04 DIAGNOSIS — Z79.82: ICD-10-CM

## 2020-11-04 DIAGNOSIS — R41.0: ICD-10-CM

## 2020-11-04 DIAGNOSIS — I25.2: ICD-10-CM

## 2020-11-04 DIAGNOSIS — Z91.14: ICD-10-CM

## 2020-11-04 DIAGNOSIS — Z20.828: ICD-10-CM

## 2020-11-04 DIAGNOSIS — I25.5: ICD-10-CM

## 2020-11-04 DIAGNOSIS — D63.0: ICD-10-CM

## 2020-11-04 DIAGNOSIS — I11.0: ICD-10-CM

## 2020-11-04 DIAGNOSIS — K59.00: ICD-10-CM

## 2020-11-04 DIAGNOSIS — I50.20: ICD-10-CM

## 2020-11-04 LAB
ALBUMIN SERPL BCG-MCNC: 3.7 G/DL (ref 3.4–4.8)
ALP SERPL-CCNC: 641 U/L (ref 40–110)
ALT SERPL W P-5'-P-CCNC: (no result) U/L (ref 8–55)
ANION GAP SERPL CALC-SCNC: 17 MMOL/L (ref 10–20)
AST SERPL-CCNC: 24 U/L (ref 5–34)
BASOPHILS # BLD AUTO: 0 THOU/UL (ref 0–0.2)
BASOPHILS NFR BLD AUTO: 0.7 % (ref 0–1)
BILIRUB SERPL-MCNC: 0.4 MG/DL (ref 0.2–1.2)
BUN SERPL-MCNC: 30 MG/DL (ref 8.4–25.7)
CALCIUM SERPL-MCNC: 8.7 MG/DL (ref 7.8–10.44)
CHLORIDE SERPL-SCNC: 98 MMOL/L (ref 98–107)
CK MB SERPL-MCNC: 1.3 NG/ML (ref 0–6.6)
CO2 SERPL-SCNC: 28 MMOL/L (ref 23–31)
CREAT CL PREDICTED SERPL C-G-VRATE: 0 ML/MIN (ref 70–130)
EOSINOPHIL # BLD AUTO: 0.1 THOU/UL (ref 0–0.7)
EOSINOPHIL NFR BLD AUTO: 1.4 % (ref 0–10)
GLOBULIN SER CALC-MCNC: 3.2 G/DL (ref 2.4–3.5)
GLUCOSE SERPL-MCNC: 119 MG/DL (ref 83–110)
HGB BLD-MCNC: 9.7 G/DL (ref 14–18)
LIPASE SERPL-CCNC: 5 U/L (ref 8–78)
LYMPHOCYTES # BLD AUTO: 1 THOU/UL (ref 1.2–3.4)
LYMPHOCYTES NFR BLD AUTO: 15.2 % (ref 21–51)
MCH RBC QN AUTO: 24.5 PG (ref 27–31)
MCV RBC AUTO: 84.6 FL (ref 78–98)
MDIFF COMPLETE?: YES
MONOCYTES # BLD AUTO: 0.5 THOU/UL (ref 0.11–0.59)
MONOCYTES NFR BLD AUTO: 7.9 % (ref 0–10)
NEUTROPHILS # BLD AUTO: 5.1 THOU/UL (ref 1.4–6.5)
NEUTROPHILS NFR BLD AUTO: 74.8 % (ref 42–75)
OVALOCYTES BLD QL SMEAR: (no result) (100X)
PLATELET # BLD AUTO: 248 THOU/UL (ref 130–400)
POIKILOCYTOSIS BLD QL SMEAR: (no result) (100X)
POTASSIUM SERPL-SCNC: 3.9 MMOL/L (ref 3.5–5.1)
RBC # BLD AUTO: 3.96 MILL/UL (ref 4.7–6.1)
SCHISTOCYTES BLD QL SMEAR: (no result) (100X)
SODIUM SERPL-SCNC: 139 MMOL/L (ref 136–145)
SP GR UR STRIP: 1.02 (ref 1–1.03)
TROPONIN I SERPL DL<=0.01 NG/ML-MCNC: 0.03 NG/ML (ref ?–0.03)
WBC # BLD AUTO: 6.8 THOU/UL (ref 4.8–10.8)

## 2020-11-04 PROCEDURE — 85049 AUTOMATED PLATELET COUNT: CPT

## 2020-11-04 PROCEDURE — 86900 BLOOD TYPING SEROLOGIC ABO: CPT

## 2020-11-04 PROCEDURE — 86901 BLOOD TYPING SEROLOGIC RH(D): CPT

## 2020-11-04 PROCEDURE — 87635 SARS-COV-2 COVID-19 AMP PRB: CPT

## 2020-11-04 PROCEDURE — 81003 URINALYSIS AUTO W/O SCOPE: CPT

## 2020-11-04 PROCEDURE — 85025 COMPLETE CBC W/AUTO DIFF WBC: CPT

## 2020-11-04 PROCEDURE — 85014 HEMATOCRIT: CPT

## 2020-11-04 PROCEDURE — 51701 INSERT BLADDER CATHETER: CPT

## 2020-11-04 PROCEDURE — 85018 HEMOGLOBIN: CPT

## 2020-11-04 PROCEDURE — 86850 RBC ANTIBODY SCREEN: CPT

## 2020-11-04 PROCEDURE — 71045 X-RAY EXAM CHEST 1 VIEW: CPT

## 2020-11-04 PROCEDURE — 84484 ASSAY OF TROPONIN QUANT: CPT

## 2020-11-04 PROCEDURE — 96372 THER/PROPH/DIAG INJ SC/IM: CPT

## 2020-11-04 PROCEDURE — 83690 ASSAY OF LIPASE: CPT

## 2020-11-04 PROCEDURE — 96374 THER/PROPH/DIAG INJ IV PUSH: CPT

## 2020-11-04 PROCEDURE — 80053 COMPREHEN METABOLIC PANEL: CPT

## 2020-11-04 PROCEDURE — 82553 CREATINE MB FRACTION: CPT

## 2020-11-04 PROCEDURE — 70450 CT HEAD/BRAIN W/O DYE: CPT

## 2020-11-04 PROCEDURE — 36415 COLL VENOUS BLD VENIPUNCTURE: CPT

## 2020-11-04 PROCEDURE — 87040 BLOOD CULTURE FOR BACTERIA: CPT

## 2020-11-04 PROCEDURE — 96375 TX/PRO/DX INJ NEW DRUG ADDON: CPT

## 2020-11-04 PROCEDURE — 93005 ELECTROCARDIOGRAM TRACING: CPT

## 2020-11-04 PROCEDURE — 86140 C-REACTIVE PROTEIN: CPT

## 2020-11-04 PROCEDURE — 83605 ASSAY OF LACTIC ACID: CPT

## 2020-11-04 PROCEDURE — U0003 INFECTIOUS AGENT DETECTION BY NUCLEIC ACID (DNA OR RNA); SEVERE ACUTE RESPIRATORY SYNDROME CORONAVIRUS 2 (SARS-COV-2) (CORONAVIRUS DISEASE [COVID-19]), AMPLIFIED PROBE TECHNIQUE, MAKING USE OF HIGH THROUGHPUT TECHNOLOGIES AS DESCRIBED BY CMS-2020-01-R: HCPCS

## 2020-11-04 PROCEDURE — G0378 HOSPITAL OBSERVATION PER HR: HCPCS

## 2020-11-04 PROCEDURE — 80048 BASIC METABOLIC PNL TOTAL CA: CPT

## 2020-11-04 PROCEDURE — 72100 X-RAY EXAM L-S SPINE 2/3 VWS: CPT

## 2020-11-04 PROCEDURE — 72129 CT CHEST SPINE W/DYE: CPT

## 2020-11-04 NOTE — RAD
LUMBAR SPINE THREE VIEWS:

11/4/20

 

While no fracture is seen, there are diffuse sclerotic lesions throughout the entire lumbosacral spin
e and pelvis. The findings are consistent with the known metastatic diagnosis. The disc spaces are no
rmal in height. The aorta and iliac arteries are densely calcified. A moderate amount of fecal materi
al is seen in the right colon. 

 

IMPRESSION: 

No fractures, but numerous sclerotic metastatic foci are seen in the lumbosacral spine and pelvis. 

 

POS: HOME

## 2020-11-04 NOTE — CT
CT OF THE BRAIN WITHOUT CONTRAST:

11/4/20

 

Comparison is made with the prior study dated 8/11/20.

 

The ventricles are normal in size for age and atrophy. Deep white matter lucency is typical of chroni
c microvascular ischemia. There are also more focal low density lesions consistent with lacunar infar
cts, especially in the right thalamus, left basal ganglia and left centrum semiovale. No bleeding, ma
ss, or edema was seen. None of these findings seem much different than on the prior exam. No destruct
ariel lesions are seen in the skull. The paranasal sinuses are clear. A few of the mastoid air cells ar
e semiopaque. 

 

IMPRESSION: 

Chronic ischemic changes, including several areas of focal lacunar infarcts. No acute intracranial fi
ndings. 

 

Preliminary report discussed with Dr. Farrell at 1912 on 11/4/20. 

 

POS: HOME

## 2020-11-05 RX ADMIN — HYDROCODONE BITARTRATE AND ACETAMINOPHEN PRN TAB: 5; 325 TABLET ORAL at 23:35

## 2020-11-05 RX ADMIN — HYDROCODONE BITARTRATE AND ACETAMINOPHEN PRN TAB: 5; 325 TABLET ORAL at 08:31

## 2020-11-05 RX ADMIN — ASPIRIN 81 MG CHEWABLE TABLET SCH MG: 81 TABLET CHEWABLE at 21:03

## 2020-11-05 RX ADMIN — HYDROCODONE BITARTRATE AND ACETAMINOPHEN PRN TAB: 5; 325 TABLET ORAL at 00:44

## 2020-11-05 RX ADMIN — CARVEDILOL SCH EACH: 3.12 TABLET, FILM COATED ORAL at 21:50

## 2020-11-05 RX ADMIN — CYANOCOBALAMIN TAB 1000 MCG SCH MCG: 1000 TAB at 21:03

## 2020-11-05 RX ADMIN — HYDROCODONE BITARTRATE AND ACETAMINOPHEN PRN TAB: 5; 325 TABLET ORAL at 17:01

## 2020-11-05 NOTE — XMS
Patient Health Record

                           Created on:2020



Patient:Randy Link

Sex:Male

:1940

External Reference #:[id number]





Demographics







                          Address                   [address] 



                                                    [place] [address]

 

                          Home Phone                [phone]

 

                          Mobile Phone              [phone]

 

                          Preferred Language        en

 

                          Marital Status            Unknown

 

                          Moravian Affiliation     Unknown

 

                          Race                      White

 

                          Ethnic Group              Unknown









Author







                          Organization              [facility]

ates

 

                          Address                   [address] [place] [address]

 

                          Phone                     [phone]









Support







                Name            Relationship    Address         Phone

 

                Nikolay           Unavailable     [address]       [phone]



                                                [place] [address] 

 

                Nikolay           Unavailable     [address]      [phone]



                                                [place] [address] 









Care Team Providers







                    Name                Role                Phone

 

                    Seneff              Unavailable         [phone]

 

                    Tala         Unavailable         [phone]

 

                    Parrent             Unavailable         [phone]

 

                    Maraist             Unavailable         [phone]

 

                    Patricia             Unavailable         [phone]

 

                    Flaherty              Unavailable         [phone]









PROBLEMS







        Type    Condition ICD9-CM WRK75-BM Onset   Condition SNOMED Code Notes



                        Code    Code    Dates   Status          

 

        Problem Essential 401.1                   Active  7785069 



                hypertension,                                         



                benign                                          

 

        Problem Iron deficiency 280.9                   Active  94610354 



                anemia                                          

 

        Problem *x*Seroma         T14.8           Active  696813164 

 

        Problem Other           N13.39          Active  17063687 



                hydronephrosis                                         

 

        Problem Lymphadenopathy         R59.1           Active  32635278 

 

        Problem Soft tissue mass         M79.9           Active  711776571 

 

        Problem Elevated PSA         R97.20          Active  610877394 

 

        Problem Noncompliance         Z91.19          Active  8539297 

 

        Problem Metastatic         C79.82          Active  65714250 



                malignant neoplasm                                         



                to prostate                                         







ALLERGIES

No Known Allergies



ENCOUNTERS from 1940 to 2020







             Encounter    Location     Date         Provider     Diagnosis

 

             [facility] [address] [facility] [address] 09 Sep, 2020 Claribel TRIPLETT

eneff 



             [place] [address]                           







IMMUNIZATIONS







                Vaccine         Route           Administration Date Status

 

                Dexamethasone   Unknown         Dec 07, 2012    Administered







SOCIAL HISTORY

Sex Assigned At Birth:





                          Social History Observation Description

 

                          Sex Assigned At Birth     Unknown







REASON FOR REFERRAL from 1940 to 2020







                          Referring Provider First Name Carmencita

 

                          Referring Provider Last Name Tala

 

                          Referring Provider Specialty Urology

 

                          Referring Provider email  [email or web address]

 

                          Referred Provider         [facility]

stem, Medical Records







VITAL SIGNS from 1940 to 2020







                    Weight              190 lbs             20 Sep, 2018

 

                    Height              66 in               20 Sep, 2018

 

                    BMI                 30.66 kg/m2         20 Sep, 2018

 

                    Heart Rate          72 /min             20 Sep, 2018

 

                    Temperature         98.1 degrees Fahrenheit 20 Sep, 2018

 

                    Oximetry            96% RA %            31 Dec, 2012

 

                    Respiratory Rate    18 /min             07 Dec, 2012

 

                    Blood pressure systolic 140 mm Hg           20 Sep, 2018

 

                    Blood pressure diastolic 70 mm Hg            20 Sep, 2018







MEDICATIONS







             Medication   SIG (Take, Route, Start Date   End Date     Status



                          Frequency, Duration)                           

 

             Lyrica 25 MG 2 capsules Orally Once a                           Act

ariel



                          day                                    

 

             Norco  MG 1-2 tablets Orally every                           

Not-Taking



                          four to six hours prn for                           



                          pain                                   

 

             Docusate Sodium 100 MG 1 capsule as needed Orally                  

         Active



                          Once a day                             

 

             Casodex 50 MG 1 tablet Orally Once a day                           

Active

 

             Tramadol HCl 50 MG 1 tablet as needed Orally                       

    Active



                          every 6 hrs                            

 

             Flomax 0.4 MG 1 capsule Orally twice a                           Ac

tive



                          day                                    

 

             Aspirin 325 MG 1 tablet Orally Once a day                          

 Not-Taking

 

             Finasteride 5 MG 1 tablet Orally Once a day                        

   Active

 

             Losartan Potassium 50 mg 1 tablet Orally Once a day 07 Dec, 2012   

           Not-Taking

 

             Bicalutamide 50 MG 1 tablet Orally Once a day                      

     Active

 

             Amlodipine Besylate 10 mg 1 tablet Orally Once a day 07 Dec, 2012  

            Not-Taking

 

             Lupron                                              Not-Taking

 

             FeroSul 325 mg once a day Orally                           Active







PROCEDURES

No Information



RESULTS

No Results



REASON FOR VISIT

referral,  Casodex/FU (LM X 3), Hospital FU, pflow/pvr, Hospital fu  , to 
discuss symptoms, r/s appt, needs MRI orders, Appt complaints/concerns, wanting 
to talk to someone, Maraist/MCR,BCBS/LBP, MRIwith Contrast, MRI Scheduling, 
NEURO EVAL/LEG PAIN, unable to urinate,  Cx NP appt, Mass in back, Mass in 
back, Mass in back, Mass in back, REFILLS RX, [facility] follow up - 12-
anemia, HGB,NON FASTING LAB, Patient wants to speak to you about referral, Iron 
Deficiency Anemia, UTI, Follow up - uticaria, ED Follow up - iron def anemia, 
Follow up - urticaria and hypertension, Weakness, shakes, DISC MEDS-PLEASE CALL,
Rash, REFILL MEDS, REFILL ALL MEDS



MEDICAL (GENERAL) HISTORY







                    Type                Description         Date

 

                    Medical History     Hypertension        

 

                    Medical History     metastatic prostate cancer 

 

                    Surgical History    appendectomy        childhood

 

                    Surgical History    mass on back        

 

                    Hospitalization History [facility] snake bite 

 

                    Hospitalization History surgery related     

 

                    Hospitalization History sepsis with hydronephrosis 2018







Goals Section

No Information



Health Concerns

No Information



MEDICAL EQUIPMENT

No Information



MENTAL STATUS

No Information



FUNCTIONAL STATUS

No Information



ASSESSMENTS







                    Encounter Date      Diagnosis           Notes

 

                    18 Dec, 2012        Iron deficiency anemia (ICD9-CM - 280.9)

 

 

                            Soft tissue mass (ICD-10 - M79.9) 

 

                    20 Sep, 2018        Lymphadenopathy (ICD-10 - R59.1) 

 

                    31 Dec, 2012        Iron deficiency anemia (ICD9-CM - 280.9)

 

 

                    07 Dec, 2012        Urticaria (ICD9-CM - 708.9) 

 

                    10 Dec, 2012        Lightheaded (ICD9-CM - 780.4) 

 

                    20 Sep, 2018        Noncompliance (ICD-10 - Z91.19) 

 

                    01 May, 2018        Spinal stenosis at L4-L5 level (ICD-10 -

 M48.061) 

 

                    20 Sep, 2018        Other hydronephrosis (ICD-10 - N13.39) 

 

                    12 Oct, 2016        *x*Seroma (ICD-10 - T14.8) 

 

                    10 Dec, 2012        Hypertension (ICD9-CM - 401.9) 

 

                    15 May, 2018        Spinal stenosis at L4-L5 level (ICD-10 -

 M48.061) 

 

                    20 Sep, 2018        Elevated PSA (ICD-10 - R97.20) 

 

                    19 Dec, 2012        Iron deficiency anemia (ICD9-CM - 280.9)

 

 

                    28 Sep, 2016        *x*Seroma (ICD-10 - T14.8) 

 

                    07 Dec, 2012        Hypertension (ICD9-CM - 401.9) 

 

                    10 Dec, 2012        Urticaria (ICD9-CM - 708.9) 

 

                    18 Dec, 2012        Urticaria (ICD9-CM - 708.9) 

 

                    10 Dec, 2012        Tachycardia (ICD9-CM - 785.0) 

 

                    20 Dec, 2012        Iron deficiency anemia (ICD9-CM - 280.9)

 

 

                            Essential hypertension, benign (ICD9-CM 

- 401.1) 

 

                    28 Sep, 2016        Iron deficiency anemia (ICD9-CM - 280.9)

 

 

                            *x*Seroma (ICD-10 - T14.8) 

 

                    20 Sep, 2018        Metastatic malignant neoplasm to prostat

e (ICD-10 - C79.82) 

 

                    20 Dec, 2012        Urticaria (ICD9-CM - 708.9) 

 

                    20 Dec, 2012        Iron deficiency anemia (ICD9-CM - 280.9)

 







PLAN OF TREATMENT

Treatment Notes





                    Assessment          Notes               Clinical Notes

 

                    *x*Seroma           Follow up 3 months to confirm 



                                        this seroma that is likely 



                                        post-traumatic is gone. 

 

                    Other hydronephrosis                     As previously revie

w with



                                                            patient in detail,



                                                            progression of renal



                                                            failure reviewed. He

 has



                                                            previously and jazmyn

nues



                                                            to decline nephrosto

my



                                                            tube, ureter stent n

ot



                                                            advised due to obstr

ucting



                                                            tumor.

 

                    Soft tissue mass    The seroma is gone.  No 



                                        further treatment or surgery 



                                        needed.  He has a small lipoma 



                                        at the lowest aspect of the 



                                        previous seroma location.  No 



                                        excision needed unless it 



                                        enlarges or becomes more 



                                        symptomatic.        

 

                    Iron deficiency anemia Will review stool occult blood 



                                        cards when results recieved. 



                                        Will need to be set up for 



                                        colonoscopy.  In the meantime, 



                                        will recheck CBC to make sure 



                                        stable.             

 

                    Urticaria           Continue Benadryl as needed 



                                        for itching and rash. Notify 



                                        or go to ED for any tongue 



                                        swelling or respiratory 



                                        difficulty.         

 

                    Iron deficiency anemia Pt. will contact us after the 



                                        first of the year to set up 



                                        his gastroenterology 



                                        appointment (resume referral). 



                                         He has been counseled the 



                                        importance of further work-up 



                                        for his anemia, as even in the 



                                        event that his CBC has 



                                        dramatically improved, it 



                                        doesn't explain the cause.  If 



                                        colonoscopy negative, will 



                                        need to see hematologist. 

 

                    *x*Seroma           I suspect resolving 



                                        hematoma/seroma.  Plan f/u in 



                                        2 weeks to re assess.  He may 



                                        need another drainage 



                                        procedure           

 

                    Essential hypertension, Monitor blood pressure reading 



                    benign              as an outpatient. Notify for 



                                        average SBP>140 or  



                                        DBP>90.Compliance recommended 



                                        with no added salt  



                                        diet.Moderate aerobic 



                                        exercise, recommended as 



                                        tolerated.Defers pneumovax at 



                                        this time.          

 

                    Lightheaded         Treatment pending results. 

 

                    Metastatic malignant neoplasm                     78-year-ol

d male seen as



                    to prostate                             an inpatient consult

ation



                                                            with grossly elevate

d PSA



                                                            >2200 consistent wit

h



                                                            metastatic prostate



                                                            cancer, imaging



                                                            demonstrated diffuse



                                                            metastatic disease,



                                                            hydronephrosis due t

o



                                                            obstructing tumor. H

ad



                                                            incomplete void reso

lved



                                                            with Flomax twice a 

day,



                                                            continues to take Ca

sodex



                                                            which is refilled by

 his



                                                            PCP. Patient decline

s



                                                            physical exam, encou

rage



                                                            patient as previous 

to



                                                            follow-up with medic

al



                                                            oncology for Lupron.

 He



                                                            continues to say santosh

t he



                                                            will, however has no

t



                                                            followed through.



                                                            Extensive time spent

 with



                                                            patient and wife rel

ating



                                                            previous imaging, PS

A



                                                            results consistent w

ith



                                                            metastatic prostate



                                                            cancer. I did expres

s my



                                                            concern regarding th

eir



                                                            denial of treatment 

and



                                                            denial of their diag

nosis.



                                                            I offered patient [place]

pron



                                                            in my office, follow

-up



                                                            PSA, he declined. I



                                                            offered patient that

 I



                                                            would call Dr. Flash rome to



                                                            schedule follow-up



                                                            appointment which is

 also



                                                            declined as well. We



                                                            discussed alternativ

ely,



                                                            bilateral orchiectom

y for



                                                            surgical castration,



                                                            however would prefer



                                                            biopsy prior to



                                                            orchiectomy. He decl

iza



                                                            prostate biopsy,



                                                            alternative options 

of



                                                            bilateral orchiectom

y. As



                                                            such I informed the



                                                            patient that he may



                                                            contact me if he jacob

nges



                                                            his mind .. Gave him

 prn



                                                            appt with me as he i

s



                                                            declining treatment.



                                                            Patient's poor progn

osis



                                                            was reviewed with jose anderson



                                                            and wife in detail,



                                                            questions were encou

raged



                                                            and answered to chino vyas.

 

                    Hypertension        Monitor blood pressure reading 



                                        as an outpatient. Notify for 



                                        average SBP>140 or DBP>90. 



                                        DASH diet.          

 

                    Tachycardia         Recommend further work-up 



                                        including CXR, CBC, TSH, CMP, 



                                        EKG, cardiac enzymes, BNP in 



                                        ED setting.  Spoke with Dr. Doherty, [facility] ED Physician who 



                                        will work-up patient further. 

 

                    Urticaria           Will remain off ACE inhibitor 



                                        for now.            



Referrals





                          Referral Date             Details

 

                          2013, Iron Deficiency 

Anemia|Needs Colonoscopy, Roque Gardnerm

 

                                                    emr transfer

 

                                                    Mass in back, [address]

, [place], [address], [phone]

 

                                                    Unable to urinate, [address], [place], [address], [phone]

 

                                                    [facility] [address], Elvis Baer, [address]

[address], [place], [address],



                                                    [phone]

 

                                                    HEART FAILURE, Claribel Alexf,

 [address], [place], [address],



                                                    [phone]

 

                                                    







Insurance Providers







          Payer Name Payer Address Payer     Insured   Patient   Coverage  Cover

age



                              Phone     Name      Relationship to Start Date End

 Date



                                                  Insured             

 

          Medicare  [address] ATTN [id number] Thang Link      2007 



                    [address] [place]                                         



                    [id number]                                         

 

          [facility] [address] [phone] Thang Link      2007 



          [facility] [address]       [address] [place]                         



          [facility]     [address]

## 2020-11-06 RX ADMIN — CYANOCOBALAMIN TAB 1000 MCG SCH MCG: 1000 TAB at 20:51

## 2020-11-06 RX ADMIN — CARVEDILOL SCH EACH: 3.12 TABLET, FILM COATED ORAL at 20:52

## 2020-11-06 RX ADMIN — ACETAMINOPHEN AND CODEINE PHOSPHATE PRN TAB: 300; 30 TABLET ORAL at 02:53

## 2020-11-06 RX ADMIN — HYDROCODONE BITARTRATE AND ACETAMINOPHEN PRN TAB: 5; 325 TABLET ORAL at 07:42

## 2020-11-06 RX ADMIN — HYDROCODONE BITARTRATE AND ACETAMINOPHEN PRN TAB: 5; 325 TABLET ORAL at 20:50

## 2020-11-06 RX ADMIN — ASPIRIN 81 MG CHEWABLE TABLET SCH MG: 81 TABLET CHEWABLE at 20:50

## 2020-11-06 RX ADMIN — HYDROCODONE BITARTRATE AND ACETAMINOPHEN PRN TAB: 5; 325 TABLET ORAL at 14:13

## 2020-11-06 NOTE — HP
PRIMARY CARE PHYSICIAN:  Bessie Peres DO



CHIEF COMPLAINT:  Sudden onset of loss of strength and sensation of bilateral 
lower

extremities. 



HISTORY OF PRESENT ILLNESS:  Mr. Link is a 80-year-old  gentleman with

coronary artery disease, ischemic cardiomyopathy with ejection fraction of 40%,

hyperlipidemia, hypertension, and prostate cancer with metastasis to multiple 
sites

including bone and spine, on Casodex, under the care of Dr. Tsang at [facility].

His condition started in 2018 when he presented to [facility] with 
significantly

high PSA of 852, consistent with prostate cancer with metastatic lesions.  He 
did

not have a prior biopsy.  He was seen by Dr. Dye in March of 2018.  He was

advised Lupron, immunotherapy (Zytiga), and prednisone; however, he was lost to

follow up and admitted that he cannot afford Zytiga.  The patient presented to 
[facility] on 07/17/2018, complaining of bilateral lower extremity discomfort

described as burning pain down the back of his legs.  He was seen by Dr. Palumbo
and 

Neurosurgery, and discussed with the patient that these symptoms were likely

secondary to metastatic disease.  He was also seen by Dr. Edgar, and 
advised

him to initiate palliative therapy for metastatic prostate cancer.  He was 
offered a

prostate biopsy, but he declined.  He was repeatedly advised by the specialist

regarding poor prognosis of his prostate cancer due to widespread metastatic

lesions.  He was referred for palliative care.  Further review of medical 
records

showed multiple imaging consisting of CT in February 2018, demonstrating 
extensive

lymphadenopathy of the paraaortic, paracaval iliac nodes, multiple sclerotic 
areas

of the spine and pelvis consistent with diffuse metastatic disease, presence of

L4-L5 stenosis.  CT scan on 07/16/2018, showed multiple bilateral lung nodules

consistent with widespread osseous metastatic disease.  He had mediastinal and 
left

hilar lymphadenopathy.  Prostate was irregular with lobulated mass in the 
bladder

extending from prostate consistent with local invasion.  The patient had an MRI,

07/02/2018, that showed abnormal signal of L5 vertebral body suspicious for

pathologic fracture from bony metastatic disease.  He had marked 
lymphadenopathy.

According to patient, since then, he has been confined to a wheelchair.  
However, he

thought this was secondary to advanced bilateral knee osteoarthritis.  According
to

patient, his specialist from [facility] refused to prescribe him Casodex, hence he

transferred care to [facility] with Dr. Tsang (oncologist).  He has not done 
any

additional imaging recently.  The patient was admitted for acute on chronic

congestive heart failure recently, and had CT of the abdomen and pelvis that 
showed

bilateral pleural effusion, resolution of pleural-based nodular lesion of the 
left

lung.  However, he had interval development of prominent nodular thickening

involving adrenal glands.  There were also noted diffuse osseous metastatic 
disease.

 The patient was under home health for physical therapy.  He was also advised

recently by his PCP to undergo cardiac outpatient rehab.  Unfortunately, prior 
to

admission, he had difficulty with transfers and sudden weakness, hence 
evaluation at the ED and

subsequent admission on November 4, 2020. 



PAST MEDICAL HISTORY:  

1. Coronary artery disease.

2. Congestive heart failure, systolic dysfunction with EF of 40%.

3. Hyperlipidemia.

4. Hypertension.

5. Metastatic prostate cancer.

6. Nephrolithiasis.

7. Noncompliance.



PAST SURGICAL HISTORY:  Appendectomy.



SOCIAL HISTORY:  The patient drinks socially.  Denies tobacco or illicit drug 
use.



FAMILY HISTORY:  Noncontributory.



MEDICATIONS:  

1. Dulcolax 5 mg daily as needed.

2. Colace 100 mg daily.

3. Vitamin B12 at bedtime.

4. Tylenol with Codeine one tablet every 12 hours p.r.n.

5. Flomax 0.4 mg b.i.d.

6. Coreg 3.125 p.o. b.i.d.

7. Lasix 40 mg b.i.d.

8. Casodex 50 mg at bedtime.

9. Aspirin 81 mg daily.



ALLERGIES:  ACE INHIBITORS, AMITRIPTYLINE, VALSARTAN, HCTZ.



REVIEW OF SYSTEMS:  GENERAL:  Negative for fever, chills.  Recently tested 
negative

for COVID-19. 

HEENT:  Positive for blurred vision.  Positive for hearing loss. 

CARDIOVASCULAR:  Negative for chest pain.  Negative for cyanosis. 

RESPIRATORY:  Negative for cough, shortness of breath, or wheezing. 

GI:  Positive for constipation.  Negative for diarrhea. 

GENITOURINARY:  Positive for urinary frequency, positive for urinary retention. 

MUSCULOSKELETAL:  Bilateral lower extremity weakness and numbness.  Positive for

ribcage pain. 

SKIN:  Negative for lesions or itching.



PHYSICAL EXAMINATION:

VITAL SIGNS:  Blood pressure of 110/59, temperature 98, pulse of 72, R of 16, O2

saturation 100%. 

GENERAL:  Patient is obese, not in distress.  Afebrile. 

HEENT:  Normocephalic, atraumatic.  Pupils equally reactive to light.  The 
patient

has mask due to COVID-19. 

NECK:  Supple.  Negative for lymphadenopathy.  Negative for jugular venous

distention. 

CHEST AND LUNGS:  Symmetrical expansion, decreased breath sounds due to body

habitus. 

CARDIOVASCULAR:  Regular rate, rhythm.  Negative for murmur, rubs, or gallops. 

ABDOMEN:  Distended.  Normoactive bowel sounds.  He has numbness on bilateral 
rib

cage. 

MUSCULOSKELETAL:  Spontaneous movement on bilateral upper extremities, 
symmetrical.

Positive for marked loss of strength of bilateral lower extremities. 

NEUROLOGIC:  Oriented x3, significant for numbness at level of T6.  Gait not

assessed due to weakness of bilateral lower extremities. 

PSYCH:  Slightly depressed.  Denies homicidal or suicidal ideations.



LABORATORY DATA:  CBC:  Hemoglobin of 9.7, hematocrit of 33.5, platelet of 248. 



Comprehensive metabolic panel:  Sodium of 139, potassium of 3.9, BUN of 30,

creatinine of 1.40, glucose of 119, lactic acid of 1.6, AST of 24, ALT of 7,

alkaline phosphatase of 641, CK-MB of 1.3.  Troponin 0.048 and 0.034. 



Urinalysis negative.



IMAGING:  Brain CT scan, 11/04/2020, ventricles normal in size for age and 
atrophy.

Presence of deep white matter lucency, typical of chronic microvascular 
ischemia.

There are also more focal low-density lesions consistent with lacunar infarcts 
in

the right thalamus, left basal ganglia and left centrum semiovale.  No bleeding,

mass, or edema.  Chronic ischemic changes including several areas of focal ulnar

infarcts.  No acute intracranial findings. 



Lumbar spine x-ray showed no fractures, but numerous sclerotic metastatic foci 
seen

in the lumbosacral spine and pelvis. 



IMPRESSION:  

1. A 80-year-old presented with sudden onset of bilateral lower extremity 
weakness

and numbness secondary to prostate cancer metastasis  with metastatic spinal 
cord

compression.  Admitted for physical deconditioning and possible rehab. 

2. Congestive heart failure, systolic dysfunction with ejection fraction of 40%.

3. Coronary artery disease.

4. Hypertension.

5. Hyperlipidemia.

6. Anemia.

7. Acute renal insufficiency.

8. Noncompliance for treatment of prostate cancer.

9. Full code.



PLAN:  

1. Obtain CT of the thoracic spine with contrast.

2. Prednisone to help reduce swelling and pressure.  Reconcile home medications.

3. We will discuss with his oncologist at [facility] for possible 
radiotherapy

versus surgery to treat metastatic spinal cord compression. 

4. Prognosis is poor.

5. Code status, full code.







Job ID:  [id number]



MTDD

## 2020-11-06 NOTE — CT
CT OF THE THORACIC SPINE:

 

Date:  11/06/2020

 

Spiral CT of the thoracic spine was done on this patient with a T6 level and known metastatic disease
 to bone from the prostate. The scan was done with IV contrast. 

 

No fracture or collapse of any vertebra was seen. There are diffuse sclerotic metastases in all visib
le vertebra and the visible portions of each rib. The spinal canal appears maintained throughout. The
re are no areas of obvious mass or restriction of the canal. No enhancing lesions were seen within th
e canal. No masses of concern outside of the vertebra are seen. Of all the vertebra, the T5 vertebra 
seems most involved by the sclerotic metastasis. Looking for any foraminal encroachment will be very 
difficult in this case. 

 

IMPRESSION: 

Diffuse sclerotic metastases without evidence for gross spinal canal compromise. Since the patient is
 experiencing neurological symptoms, a MRI with and without contrast would be the most sensitive way 
to detect more subtle neurological compromise. 

 

Preliminary report taken to the floor at approximately 10:00 a.m. on 11/06/2020. 

 

 

 

POS: HOME

## 2020-11-07 LAB
ANION GAP SERPL CALC-SCNC: 15 MMOL/L (ref 10–20)
BUN SERPL-MCNC: 29 MG/DL (ref 8.4–25.7)
CALCIUM SERPL-MCNC: 7.8 MG/DL (ref 7.8–10.44)
CHLORIDE SERPL-SCNC: 99 MMOL/L (ref 98–107)
CO2 SERPL-SCNC: 26 MMOL/L (ref 23–31)
CREAT CL PREDICTED SERPL C-G-VRATE: 64 ML/MIN (ref 70–130)
GLUCOSE SERPL-MCNC: 100 MG/DL (ref 83–110)
HGB BLD-MCNC: 8.4 G/DL (ref 14–18)
PLATELET # BLD AUTO: 199 THOU/UL (ref 130–400)
POTASSIUM SERPL-SCNC: 3.9 MMOL/L (ref 3.5–5.1)
SODIUM SERPL-SCNC: 136 MMOL/L (ref 136–145)

## 2020-11-07 RX ADMIN — HYDROCODONE BITARTRATE AND ACETAMINOPHEN PRN TAB: 5; 325 TABLET ORAL at 04:12

## 2020-11-07 RX ADMIN — HYDROCODONE BITARTRATE AND ACETAMINOPHEN PRN TAB: 10; 325 TABLET ORAL at 15:18

## 2020-11-07 RX ADMIN — CYANOCOBALAMIN TAB 1000 MCG SCH MCG: 1000 TAB at 21:01

## 2020-11-07 RX ADMIN — ASPIRIN 81 MG CHEWABLE TABLET SCH MG: 81 TABLET CHEWABLE at 21:01

## 2020-11-07 RX ADMIN — ACETAMINOPHEN AND CODEINE PHOSPHATE PRN TAB: 300; 30 TABLET ORAL at 01:15

## 2020-11-07 RX ADMIN — CARVEDILOL SCH EACH: 3.12 TABLET, FILM COATED ORAL at 21:08

## 2020-11-07 NOTE — PRG
DATE OF SERVICE:  11/07/2020



SUBJECTIVE:  The patient complained of moderate to severe pain on both rib cage and

midback, not relieved with Tylenol and Norco.  He was medicated with Toradol, he was

able to sleep well after that.  He has good appetite.  The patient is in good

spirit.  Wants to get evaluated soon by radio oncologist at [facility]. 



OBJECTIVE:  VITAL SIGNS:  Blood pressure of 92/61, temperature 98.4, pulse of 75,

respiratory rate of 18, O2 saturation 95% on room air. 

GENERAL:  The patient is alert, oriented, not in distress. 

HEENT:  Normocephalic and atraumatic.  Pupils equally reactive to light. 

NECK:  Supple.  Negative for lymphadenopathy. 

CHEST AND LUNGS:  Symmetrical expansion.  Clear to auscultation.   

CARDIOVASCULAR:  Regular rate, rhythm.  Negative for murmur. 

ABDOMEN:  Distended, normoactive bowel sounds.  Has numbness from ribcage down.   

MUSCULOSKELETAL:  Spontaneous movement of bilateral upper extremities.  Positive for

movement of right 1st toe.  NEUROLOGIC:  Oriented x3.  Significant for numbness at

T6.  Gait is not assessed due to weakness of bilateral extremities. 



ASSESSMENT:  

1. A 80-year-old, presented with sudden onset of numbness from T6 level with gross

bilateral lower extremity weakness secondary to thoracic sclerotic bone lesions with

prostatic cancer. 

2. Congestive heart failure, systolic dysfunction with EF of 45%.

3. Coronary artery disease.

4. Hypertension.

5. Hyperlipidemia.

6. Urinary retention secondary to #1.

7. Noncompliance for treatment of prostate cancer.

8. Full code.



PLAN:  

1. Continue present nursing care.

2. Start Lyrica 100 mg p.o. b.i.d.

3. Send referral for long-term care placement for easy accessibility once patient

starts radiation treatment for prostate cancer. 

4. Refer to case management for discharge planning.







Job ID:  [id number]

## 2020-11-07 NOTE — PRG
DATE OF SERVICE:  11/06/2020



SUBJECTIVE:  The patient continues to have numbness on rib cage and weakness of

bilateral lower extremities.  He denies any discomfort.  The patient's condition was

discussed with his oncologist, Dr. Tsang at [facility].  According to her, the

patient was advised to undergo radiation treatment.  She ordered a thoracic MRI

outpatient and referral was sent to radiation oncologist, Dr. Delacruz, regarding

multiple sclerotic bone metastasis.  However, the patient failed to schedule those

appointments. 



OBJECTIVE:  VITAL SIGNS:  Blood pressure of 104/56, temperature 99.4, pulse of 69,

respirations of 20, and O2 saturation 93% at 1 L per nasal cannula. 

GENERAL:  The patient is alert, oriented, not in respiratory distress. 

HEENT:  Normocephalic and atraumatic.  Pupils equal, reactive to light. 

NECK:  Supple.  Negative for lymphadenopathy. 

CHEST AND LUNGS:  Symmetrical expansion, decreased breath sounds due to body

habitus. 

ABDOMEN:  Slightly distended.  Normoactive bowel sounds.  He has numbness on

bilateral rib cage. 

MUSCULOSKELETAL:  Spontaneous movement of bilateral upper extremities.  Symmetrical.

 Positive for marked loss of strength of bilateral lower extremities. 

NEUROLOGIC:  Oriented x3.  Significant for numbness of the level of T6.  Gait not

assessed due to weakness of bilateral lower extremities. 

PSYCH:  Depressed.  Denies homicidal or suicidal ideations.



IMAGING:  Spiral CT of the thoracic spine showed no fracture or collapse of any

vertebral bodies.  There is diffuse sclerotic metastasis in all visible vertebra and

visible portion of each rib.  Spinal canal appears __________ throughout.  There are

no areas of obvious mass or restriction of the canal.  No enhancing lesions were

seen within the canal.  No masses of concern outside of vertebra are seen.  All the

vertebra at the T5 vertebra seems most involved by the sclerotic metastasis.

Looking for any foraminal encroachment would be very difficulty in this case. 



IMPRESSION:  Diffuse sclerotic metastasis without evidence of gross spinal cord

canal compromise.  Since the patient is experiencing neurological symptoms, MRI with

and without contrast would be more sensitive to detect more subtle neurologic

compromise. 



ASSESSMENT:  

1. A 80-year-old presented with new onset of bilateral lower extremity weakness and

numbness secondary to __________ sclerotic bone metastasis from prostate cancer. 

2. Congestive heart failure, systolic dysfunction with EF of 40%.

3. Coronary artery disease.

4. Hypertension.

5. Hyperlipidemia.

6. Anemia.

7. Acute renal insufficiency.

8. Noncompliance for treatment of prostate cancer.

9. Full code.



PLAN:  I had a long discussion with the patient and his wife about plan of care.

The patient wants to proceed with radiation treatment.  He refuses to be under

hospice care.  Wife was advised to make an appointment with Dr. Delacruz, radiation

oncologist at [facility] and to schedule thoracic MRI on Monday.  Wife is not

able to take care of him and he is not safe to go back home.  We will proceed with

referral to long-term care facility preferably in [place]. 







Job ID:  [id number]

## 2020-11-08 RX ADMIN — HYDROCODONE BITARTRATE AND ACETAMINOPHEN PRN TAB: 10; 325 TABLET ORAL at 05:49

## 2020-11-08 RX ADMIN — CYANOCOBALAMIN TAB 1000 MCG SCH MCG: 1000 TAB at 20:55

## 2020-11-08 RX ADMIN — CARVEDILOL SCH EACH: 3.12 TABLET, FILM COATED ORAL at 20:57

## 2020-11-08 RX ADMIN — MAGNESIUM HYDROXIDE PRN ML: 400 SUSPENSION ORAL at 08:32

## 2020-11-08 RX ADMIN — HYDROCODONE BITARTRATE AND ACETAMINOPHEN PRN TAB: 10; 325 TABLET ORAL at 13:27

## 2020-11-08 RX ADMIN — ASPIRIN 81 MG CHEWABLE TABLET SCH MG: 81 TABLET CHEWABLE at 20:53

## 2020-11-09 LAB
ANION GAP SERPL CALC-SCNC: 17 MMOL/L (ref 10–20)
ANISOCYTOSIS BLD QL SMEAR: (no result) (100X)
BASOPHILS # BLD AUTO: 0.1 THOU/UL (ref 0–0.2)
BASOPHILS NFR BLD AUTO: 1.2 % (ref 0–1)
BUN SERPL-MCNC: 38 MG/DL (ref 8.4–25.7)
CALCIUM SERPL-MCNC: 7.8 MG/DL (ref 7.8–10.44)
CHLORIDE SERPL-SCNC: 94 MMOL/L (ref 98–107)
CO2 SERPL-SCNC: 26 MMOL/L (ref 23–31)
CREAT CL PREDICTED SERPL C-G-VRATE: 57 ML/MIN (ref 70–130)
EOSINOPHIL # BLD AUTO: 0.1 THOU/UL (ref 0–0.7)
EOSINOPHIL NFR BLD AUTO: 1 % (ref 0–10)
GLUCOSE SERPL-MCNC: 128 MG/DL (ref 83–110)
HGB BLD-MCNC: 8.2 G/DL (ref 14–18)
LYMPHOCYTES # BLD AUTO: 1.2 THOU/UL (ref 1.2–3.4)
LYMPHOCYTES NFR BLD AUTO: 15.3 % (ref 21–51)
MCH RBC QN AUTO: 24.6 PG (ref 27–31)
MCV RBC AUTO: 84.2 FL (ref 78–98)
MDIFF COMPLETE?: YES
MONOCYTES # BLD AUTO: 0.8 THOU/UL (ref 0.11–0.59)
MONOCYTES NFR BLD AUTO: 9.5 % (ref 0–10)
NEUTROPHILS # BLD AUTO: 5.9 THOU/UL (ref 1.4–6.5)
NEUTROPHILS NFR BLD AUTO: 73.1 % (ref 42–75)
PLATELET # BLD AUTO: 227 THOU/UL (ref 130–400)
POLYCHROMASIA BLD QL SMEAR: (no result) (100X)
POTASSIUM SERPL-SCNC: 4.4 MMOL/L (ref 3.5–5.1)
RBC # BLD AUTO: 3.34 MILL/UL (ref 4.7–6.1)
SODIUM SERPL-SCNC: 133 MMOL/L (ref 136–145)
WBC # BLD AUTO: 8.1 THOU/UL (ref 4.8–10.8)

## 2020-11-09 RX ADMIN — CYANOCOBALAMIN TAB 1000 MCG SCH MCG: 1000 TAB at 21:12

## 2020-11-09 RX ADMIN — MAGNESIUM HYDROXIDE PRN ML: 400 SUSPENSION ORAL at 09:28

## 2020-11-09 RX ADMIN — ASPIRIN 81 MG CHEWABLE TABLET SCH MG: 81 TABLET CHEWABLE at 21:11

## 2020-11-09 RX ADMIN — CARVEDILOL SCH EACH: 3.12 TABLET, FILM COATED ORAL at 21:12

## 2020-11-09 NOTE — PRG
DATE OF SERVICE:  11/08/2020



SUBJECTIVE:  He denies any complaints, he slept pretty well, appetite is at

baseline.  According to the nurses, he is not drinking as much.  Still has no bowel

movement since admission. 



OBJECTIVE:  VITAL SIGNS:  Blood pressure of 93/54, temperature of 99, pulse rate of

83, RR of 18, and O2 saturation 94% on room air. 

GENERAL:  The patient is drowsy, but arousable, not in respiratory distress. 

HEENT:  Normocephalic and atraumatic.  Pupils are equal and reactive to light. 

NECK:  Supple.  Negative for lymphadenopathy. 

CHEST AND LUNGS:  Symmetrical expansion, decreased breath sounds due to body

habitus. 

CARDIOVASCULAR:  Regular rate and rhythm.  Negative for murmur. 

ABDOMEN:  Distended, hyperactive bowel sounds. 

MUSCULOSKELETAL:  Spontaneous movement of bilateral upper extremity, positive for

movement of right first toe.  Gait is not assessed due to weakness of bilateral

extremities. 



ASSESSMENT:  

1. A 80-year-old, presented with sudden onset of numbness from T6 level with gross

bilateral lower extremity weakness secondary to thoracic sclerotic bone lesions,

diffuse with prostate cancer. 

2. Congestive heart failure, systolic dysfunction, EF of 45%.

3. Coronary artery disease.

4. Hypertension.

5. Hyperlipidemia.

6. Urinary retention secondary to #1.

7. Constipation.

8. Anemia due to chronic disease.

9. Noncompliance for treatment for prostate cancer.

10. Full code.



PLAN:  

1. Continue present nursing care.

2. Continue medications.

3. Transfer to long-term care facility for easy accessibility once the patient start

radiation for prostate cancer at [facility].  Follow up Case Management for

discharge planning. 







Job ID:  [id number]

## 2020-11-09 NOTE — RAD
PORTABLE CHEST:

 

Date: 11-9-2020 

 

Comparison: 9-7-2020 chest radiograph. Additionally I reviewed the recent CT of the thoracic spine do
ne three days ago which shows good portions of the lungs. 

 

FINDINGS: 

No gross acute infiltrates were seen. Some minor streaking in the bases appears to be scarring lookin
g at the CT exam. There is certainly no large scale lobar infiltrate or effusion. If either detail is
 needed, one could do a noncontrast CT to study the lungs more closely. Cardiomegaly is about the denise
e as before. There is no clear congestive change. 

 

IMPRESSION: 

No definite acute finding.

 

POS: HOME

## 2020-11-10 VITALS — SYSTOLIC BLOOD PRESSURE: 103 MMHG | DIASTOLIC BLOOD PRESSURE: 60 MMHG | TEMPERATURE: 98 F

## 2020-11-10 LAB
ALBUMIN SERPL BCG-MCNC: 2.7 G/DL (ref 3.4–4.8)
ALP SERPL-CCNC: 412 U/L (ref 40–110)
ALT SERPL W P-5'-P-CCNC: 14 U/L (ref 8–55)
ANION GAP SERPL CALC-SCNC: 16 MMOL/L (ref 10–20)
ANISOCYTOSIS BLD QL SMEAR: (no result) (100X)
AST SERPL-CCNC: 25 U/L (ref 5–34)
BASOPHILS # BLD AUTO: 0.1 THOU/UL (ref 0–0.2)
BASOPHILS NFR BLD AUTO: 0.7 % (ref 0–1)
BILIRUB SERPL-MCNC: 0.4 MG/DL (ref 0.2–1.2)
BUN SERPL-MCNC: 43 MG/DL (ref 8.4–25.7)
CALCIUM SERPL-MCNC: 7.7 MG/DL (ref 7.8–10.44)
CHLORIDE SERPL-SCNC: 96 MMOL/L (ref 98–107)
CO2 SERPL-SCNC: 26 MMOL/L (ref 23–31)
CREAT CL PREDICTED SERPL C-G-VRATE: 57 ML/MIN (ref 70–130)
CRP SERPL-MCNC: 22.6 MG/DL
EOSINOPHIL # BLD AUTO: 0.1 THOU/UL (ref 0–0.7)
EOSINOPHIL NFR BLD AUTO: 1 % (ref 0–10)
GLOBULIN SER CALC-MCNC: 2.7 G/DL (ref 2.4–3.5)
GLUCOSE SERPL-MCNC: 104 MG/DL (ref 83–110)
HGB BLD-MCNC: 7.1 G/DL (ref 14–18)
LYMPHOCYTES # BLD AUTO: 1.2 THOU/UL (ref 1.2–3.4)
LYMPHOCYTES NFR BLD AUTO: 14.8 % (ref 21–51)
MACROCYTES BLD QL SMEAR: (no result) (100X)
MCH RBC QN AUTO: 25.1 PG (ref 27–31)
MCV RBC AUTO: 84.1 FL (ref 78–98)
MDIFF COMPLETE?: YES
MICROCYTES BLD QL SMEAR: (no result) (100X)
MONOCYTES # BLD AUTO: 0.8 THOU/UL (ref 0.11–0.59)
MONOCYTES NFR BLD AUTO: 9 % (ref 0–10)
NEUTROPHILS # BLD AUTO: 6.2 THOU/UL (ref 1.4–6.5)
NEUTROPHILS NFR BLD AUTO: 74.5 % (ref 42–75)
PLATELET # BLD AUTO: 229 THOU/UL (ref 130–400)
POLYCHROMASIA BLD QL SMEAR: (no result) (100X)
POTASSIUM SERPL-SCNC: 4.5 MMOL/L (ref 3.5–5.1)
RBC # BLD AUTO: 2.84 MILL/UL (ref 4.7–6.1)
SODIUM SERPL-SCNC: 133 MMOL/L (ref 136–145)
SPHEROCYTES BLD QL SMEAR: (no result) (100X)
WBC # BLD AUTO: 8.4 THOU/UL (ref 4.8–10.8)

## 2020-11-10 RX ADMIN — HYDROCODONE BITARTRATE AND ACETAMINOPHEN PRN TAB: 10; 325 TABLET ORAL at 09:00

## 2020-11-10 RX ADMIN — MAGNESIUM HYDROXIDE PRN ML: 400 SUSPENSION ORAL at 08:46

## 2020-11-11 NOTE — PRG
DATE OF SERVICE:  11/09/2020



SUBJECTIVE:  The patient has been very drowsy, weak, has poor appetite, and been

running fever since this morning.  His wife stated that he is more confused.  He

called her around 2 a.m. and was wanting to go home.  He has not had any bowel

movement. 



OBJECTIVE:  VITAL SIGNS: Temperature of 101.4, pulse of 84, respiratory rate of 20,

O2 saturation of 92% on room air, and blood pressure of 99/51. 

GENERAL:  The patient is drowsy, but arousable with commands, slightly tachypneic. 

HEENT:  Normocephalic and atraumatic. Positive for conjunctival pallor. 

NECK:  Supple. Negative for lymphadenopathy. 

CHEST AND LUNGS: Symmetrical expansion, decreased breath sounds due to body habitus. 

CARDIOVASCULAR: Regular rate and rhythm.  Negative for murmur. 

ABDOMEN:  Distended. Hyperactive bowel sounds. Negative for rebound or deep

tenderness. 

MUSCULOSKELETAL: Positive for spontaneous movement of bilateral upper extremities.

Gait not assessed due to weakness of bilateral extremities. 



LABORATORY DATA:  

1. CBC; WBC of 8.1, hemoglobin of 8.2, hematocrit of 28, and platelet count of 227.

2. BMP showed sodium of 133, potassium of 4.4, chloride of 94, carbon dioxide of 26,

anion gap of 17, BUN of 38, creatinine of 1.5, GFR of 45, glucose of 128, and

calcium of 7.8. 

3. Chest x-ray, no gross acute infiltrates seen, some minor streaking in the bases,

appears to be scarring.  Looking through the CT scan, there is certainly no large

scale of lobar infiltrates or effusion.  There are no clear congestive changes.  No

acute findings. 



ASSESSMENT:  

1. A 80-year-old, presented with sudden onset of numbness from T6 level with gross

bilateral lower extremity weakness secondary to thoracic cirrhotic bone lesions,

diffuse with prostate cancer. 

2. New onset of confusion.

3. Fever.

4. Acute anemia, symptomatic.

5. Congestive heart failure, systolic dysfunction, EF of 45%.

6. Coronary artery disease.

7. Hypertension.

8. Hyperlipidemia.

9. Urinary retention secondary to #1.

10. Constipation.

11. Noncompliance for treatment of prostate cancer with metastasis.

12. Full code.



PLAN:  

1. Discontinue Lyrica and other sedating medication.

2. Continue fluids, normal saline at 50 mL/h, monitor I's and O's.

3. Blood cultures ordered.

4. Discontinue furosemide due to low blood pressure.

5. Transfer to [facility] if condition worsens, per request of wife.  Wife

wants to proceed with radiation treatment as advised by Dr. Tsang (oncologist) at

[facility]. 

6. Discharge planning with Case Management.







Job ID:  [id number]

## 2021-01-28 ENCOUNTER — HOSPITAL ENCOUNTER (EMERGENCY)
Dept: HOSPITAL 57 - BURERS | Age: 81
Discharge: ANOTHER HOSPITAL | End: 2021-01-28
Payer: MEDICARE

## 2021-01-28 ENCOUNTER — HOSPITAL ENCOUNTER (INPATIENT)
Dept: HOSPITAL 92 - ERS | Age: 81
LOS: 27 days | Discharge: NURSING, REHAB OR LONG-TERM CARE | DRG: 177 | End: 2021-02-24
Attending: INTERNAL MEDICINE | Admitting: INTERNAL MEDICINE
Payer: MEDICARE

## 2021-01-28 VITALS — BODY MASS INDEX: 28.3 KG/M2

## 2021-01-28 DIAGNOSIS — Z79.899: ICD-10-CM

## 2021-01-28 DIAGNOSIS — Z91.19: ICD-10-CM
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
## 2021-01-28 DIAGNOSIS — E78.5: ICD-10-CM

## 2021-01-28 DIAGNOSIS — E87.6: ICD-10-CM

## 2021-01-28 DIAGNOSIS — I50.9: ICD-10-CM

## 2021-01-28 DIAGNOSIS — L89.899: ICD-10-CM

## 2021-01-28 DIAGNOSIS — I95.9: ICD-10-CM

## 2021-01-28 DIAGNOSIS — J96.01: ICD-10-CM

## 2021-01-28 DIAGNOSIS — K92.2: ICD-10-CM

## 2021-01-28 DIAGNOSIS — B96.89: ICD-10-CM

## 2021-01-28 DIAGNOSIS — I25.2: ICD-10-CM

## 2021-01-28 DIAGNOSIS — I21.A1: ICD-10-CM

## 2021-01-28 DIAGNOSIS — E78.00: ICD-10-CM

## 2021-01-28 DIAGNOSIS — C61: ICD-10-CM

## 2021-01-28 DIAGNOSIS — D63.1: ICD-10-CM

## 2021-01-28 DIAGNOSIS — I13.0: ICD-10-CM

## 2021-01-28 DIAGNOSIS — N17.9: ICD-10-CM

## 2021-01-28 DIAGNOSIS — Z79.82: ICD-10-CM

## 2021-01-28 DIAGNOSIS — J12.82: ICD-10-CM

## 2021-01-28 DIAGNOSIS — N18.31: ICD-10-CM

## 2021-01-28 DIAGNOSIS — I82.403: ICD-10-CM

## 2021-01-28 DIAGNOSIS — Z79.891: ICD-10-CM

## 2021-01-28 DIAGNOSIS — T45.515A: ICD-10-CM

## 2021-01-28 DIAGNOSIS — Z88.8: ICD-10-CM

## 2021-01-28 DIAGNOSIS — I11.0: ICD-10-CM

## 2021-01-28 DIAGNOSIS — I48.91: ICD-10-CM

## 2021-01-28 DIAGNOSIS — L89.152: ICD-10-CM

## 2021-01-28 DIAGNOSIS — C79.51: ICD-10-CM

## 2021-01-28 DIAGNOSIS — G47.33: ICD-10-CM

## 2021-01-28 DIAGNOSIS — Z87.442: ICD-10-CM

## 2021-01-28 DIAGNOSIS — M48.061: ICD-10-CM

## 2021-01-28 DIAGNOSIS — F31.9: ICD-10-CM

## 2021-01-28 DIAGNOSIS — Z85.46: ICD-10-CM

## 2021-01-28 DIAGNOSIS — J34.89: ICD-10-CM

## 2021-01-28 DIAGNOSIS — I50.23: ICD-10-CM

## 2021-01-28 DIAGNOSIS — Z90.49: ICD-10-CM

## 2021-01-28 DIAGNOSIS — U07.1: Primary | ICD-10-CM

## 2021-01-28 DIAGNOSIS — Z78.1: ICD-10-CM

## 2021-01-28 DIAGNOSIS — N39.0: ICD-10-CM

## 2021-01-28 DIAGNOSIS — I42.9: ICD-10-CM

## 2021-01-28 DIAGNOSIS — B96.5: ICD-10-CM

## 2021-01-28 DIAGNOSIS — Z53.29: ICD-10-CM

## 2021-01-28 DIAGNOSIS — I25.10: ICD-10-CM

## 2021-01-28 LAB
ALBUMIN SERPL BCG-MCNC: 2.4 G/DL (ref 3.4–4.8)
ALP SERPL-CCNC: 166 U/L (ref 40–110)
ALT SERPL W P-5'-P-CCNC: 11 U/L (ref 8–55)
ANION GAP SERPL CALC-SCNC: 14 MMOL/L (ref 10–20)
AST SERPL-CCNC: 17 U/L (ref 5–34)
BASOPHILS # BLD AUTO: 0 THOU/UL (ref 0–0.2)
BASOPHILS NFR BLD AUTO: 0.4 % (ref 0–1)
BILIRUB SERPL-MCNC: 0.5 MG/DL (ref 0.2–1.2)
BUN SERPL-MCNC: 25 MG/DL (ref 8.4–25.7)
CALCIUM SERPL-MCNC: 7 MG/DL (ref 7.8–10.44)
CHLORIDE SERPL-SCNC: 106 MMOL/L (ref 98–107)
CK MB SERPL-MCNC: 1.1 NG/ML (ref 0–6.6)
CO2 SERPL-SCNC: 24 MMOL/L (ref 23–31)
CREAT CL PREDICTED SERPL C-G-VRATE: 0 ML/MIN (ref 70–130)
EOSINOPHIL # BLD AUTO: 0 THOU/UL (ref 0–0.7)
EOSINOPHIL NFR BLD AUTO: 0.2 % (ref 0–10)
GLOBULIN SER CALC-MCNC: 2.5 G/DL (ref 2.4–3.5)
GLUCOSE SERPL-MCNC: 81 MG/DL (ref 83–110)
HGB BLD-MCNC: 10.7 G/DL (ref 14–18)
LYMPHOCYTES # BLD AUTO: 1.5 THOU/UL (ref 1.2–3.4)
LYMPHOCYTES NFR BLD AUTO: 19.4 % (ref 21–51)
MCH RBC QN AUTO: 29.1 PG (ref 27–31)
MCV RBC AUTO: 98 FL (ref 78–98)
MDIFF COMPLETE?: YES
MONOCYTES # BLD AUTO: 0.3 THOU/UL (ref 0.11–0.59)
MONOCYTES NFR BLD AUTO: 4.5 % (ref 0–10)
NEUTROPHILS # BLD AUTO: 5.8 THOU/UL (ref 1.4–6.5)
NEUTROPHILS NFR BLD AUTO: 75.6 % (ref 42–75)
PLATELET # BLD AUTO: 237 THOU/UL (ref 130–400)
POTASSIUM SERPL-SCNC: 4.2 MMOL/L (ref 3.5–5.1)
RBC # BLD AUTO: 3.67 MILL/UL (ref 4.7–6.1)
SODIUM SERPL-SCNC: 140 MMOL/L (ref 136–145)
WBC # BLD AUTO: 7.6 THOU/UL (ref 4.8–10.8)

## 2021-01-28 PROCEDURE — 84100 ASSAY OF PHOSPHORUS: CPT

## 2021-01-28 PROCEDURE — 93923 UPR/LXTR ART STDY 3+ LVLS: CPT

## 2021-01-28 PROCEDURE — 80053 COMPREHEN METABOLIC PANEL: CPT

## 2021-01-28 PROCEDURE — 80076 HEPATIC FUNCTION PANEL: CPT

## 2021-01-28 PROCEDURE — 87086 URINE CULTURE/COLONY COUNT: CPT

## 2021-01-28 PROCEDURE — 85018 HEMOGLOBIN: CPT

## 2021-01-28 PROCEDURE — 85025 COMPLETE CBC W/AUTO DIFF WBC: CPT

## 2021-01-28 PROCEDURE — 82805 BLOOD GASES W/O2 SATURATION: CPT

## 2021-01-28 PROCEDURE — 82728 ASSAY OF FERRITIN: CPT

## 2021-01-28 PROCEDURE — 87186 SC STD MICRODIL/AGAR DIL: CPT

## 2021-01-28 PROCEDURE — 96365 THER/PROPH/DIAG IV INF INIT: CPT

## 2021-01-28 PROCEDURE — P9047 ALBUMIN (HUMAN), 25%, 50ML: HCPCS

## 2021-01-28 PROCEDURE — 36415 COLL VENOUS BLD VENIPUNCTURE: CPT

## 2021-01-28 PROCEDURE — 83735 ASSAY OF MAGNESIUM: CPT

## 2021-01-28 PROCEDURE — 0240U: CPT

## 2021-01-28 PROCEDURE — 85730 THROMBOPLASTIN TIME PARTIAL: CPT

## 2021-01-28 PROCEDURE — 87449 NOS EACH ORGANISM AG IA: CPT

## 2021-01-28 PROCEDURE — 86900 BLOOD TYPING SEROLOGIC ABO: CPT

## 2021-01-28 PROCEDURE — 96366 THER/PROPH/DIAG IV INF ADDON: CPT

## 2021-01-28 PROCEDURE — 84484 ASSAY OF TROPONIN QUANT: CPT

## 2021-01-28 PROCEDURE — 86901 BLOOD TYPING SEROLOGIC RH(D): CPT

## 2021-01-28 PROCEDURE — C9113 INJ PANTOPRAZOLE SODIUM, VIA: HCPCS

## 2021-01-28 PROCEDURE — 87324 CLOSTRIDIUM AG IA: CPT

## 2021-01-28 PROCEDURE — 36600 WITHDRAWAL OF ARTERIAL BLOOD: CPT

## 2021-01-28 PROCEDURE — 87077 CULTURE AEROBIC IDENTIFY: CPT

## 2021-01-28 PROCEDURE — 85610 PROTHROMBIN TIME: CPT

## 2021-01-28 PROCEDURE — 83880 ASSAY OF NATRIURETIC PEPTIDE: CPT

## 2021-01-28 PROCEDURE — 80051 ELECTROLYTE PANEL: CPT

## 2021-01-28 PROCEDURE — 85014 HEMATOCRIT: CPT

## 2021-01-28 PROCEDURE — 93306 TTE W/DOPPLER COMPLETE: CPT

## 2021-01-28 PROCEDURE — 36416 COLLJ CAPILLARY BLOOD SPEC: CPT

## 2021-01-28 PROCEDURE — 82553 CREATINE MB FRACTION: CPT

## 2021-01-28 PROCEDURE — 93970 EXTREMITY STUDY: CPT

## 2021-01-28 PROCEDURE — 85379 FIBRIN DEGRADATION QUANT: CPT

## 2021-01-28 PROCEDURE — 36430 TRANSFUSION BLD/BLD COMPNT: CPT

## 2021-01-28 PROCEDURE — 82310 ASSAY OF CALCIUM: CPT

## 2021-01-28 PROCEDURE — 86850 RBC ANTIBODY SCREEN: CPT

## 2021-01-28 PROCEDURE — 86140 C-REACTIVE PROTEIN: CPT

## 2021-01-28 PROCEDURE — 71045 X-RAY EXAM CHEST 1 VIEW: CPT

## 2021-01-28 PROCEDURE — P9017 PLASMA 1 DONOR FRZ W/IN 8 HR: HCPCS

## 2021-01-28 PROCEDURE — 96376 TX/PRO/DX INJ SAME DRUG ADON: CPT

## 2021-01-28 PROCEDURE — 94660 CPAP INITIATION&MGMT: CPT

## 2021-01-28 PROCEDURE — 71275 CT ANGIOGRAPHY CHEST: CPT

## 2021-01-28 PROCEDURE — 80048 BASIC METABOLIC PNL TOTAL CA: CPT

## 2021-01-28 PROCEDURE — 93005 ELECTROCARDIOGRAM TRACING: CPT

## 2021-01-28 NOTE — CT
CT ANGIO OF THE CHEST 

1/28/21

 

Spiral CT of the chest was done after a bolus of IV contrast with MIP reconstructions in various plan
es. The patient is known to be COVID positive and is also known to have bony metastases.

 

There is good opacification of the pulmonary arteries. No internal defects were seen to suggest embol
i in any of the large to medium sized branches. There is, however, collapse of the right lower lobe. 
There is some surrounding fluid as well. No pericardial effusion was seen. The heart is enlarged. The
 aorta shows no aneurysm or dissection. Arteriosclerotic changes seen within it and in the coronary v
essels. The left lung is relatively clear and only a small amount of pleural fluid is seen on that si
de. 

 

Films of the upper abdomen showed no acute change. There is enlargement of the left adrenal gland whi
ch could be affected by tumor. Sclerotic bony metastases are evident in the spine as well as the hume
ral heads and probably of the right scapula. 

 

IMPRESSION: 

1.      No evidence of pulmonary embolism. 

 

2.      Collapsed right lower lobe with some surrounding fluid.

 

3.      Bony sclerotic metastases which have been mentioned on prior studies. 

 

Preliminary report called to Dr. Aragon at 2006 on 1/28/21. 

 

POS: HOME

## 2021-01-29 LAB
BACTERIA UR QL AUTO: (no result) HPF
LEUKOCYTE ESTERASE UR QL STRIP.AUTO: 500 LEU/UL
PROT UR STRIP.AUTO-MCNC: 50 MG/DL
SARS-COV-2 RNA RESP QL NAA+PROBE: DETECTED
SP GR UR STRIP: 1.01 (ref 1–1.04)
TROPONIN I SERPL DL<=0.01 NG/ML-MCNC: 0.02 NG/ML (ref ?–0.03)
TROPONIN I SERPL DL<=0.01 NG/ML-MCNC: 0.03 NG/ML (ref ?–0.03)

## 2021-01-29 PROCEDURE — XW033E5 INTRODUCTION OF REMDESIVIR ANTI-INFECTIVE INTO PERIPHERAL VEIN, PERCUTANEOUS APPROACH, NEW TECHNOLOGY GROUP 5: ICD-10-PCS | Performed by: INTERNAL MEDICINE

## 2021-01-29 RX ADMIN — Medication SCH MG: at 11:35

## 2021-01-29 RX ADMIN — ASPIRIN 81 MG CHEWABLE TABLET SCH MG: 81 TABLET CHEWABLE at 21:12

## 2021-01-29 RX ADMIN — AZITHROMYCIN SCH MLS: 500 INJECTION, POWDER, LYOPHILIZED, FOR SOLUTION INTRAVENOUS at 07:06

## 2021-01-29 NOTE — PDOC.HHP
Hospitalist HPI


Shortness of breath


History of Present Illness: 





Patient is a 80 year old male with PMH MI, HLD, HTN, kidney stones, metastatic 

prostate cancer who presents as transfer from [place] ED for shortness of 

breath, covid, hypoxia. Patient was diagnosed with COVID appx 1 weeks ago, he 

lives in nursing home. He reports chest congesion and swelling of legs 

bilaterally. In [place] ED, patient requiring 4L of O2, he was in afib w/ RVR 

rate 100-120, recieved cardizem drip with improvement of rate to 90s.COVIUD swab

was monday. he denies fevers/chills/palpitations. labs w TnI 0.34>0.22. covid 

swab positive. CTA chest performed, revealed no PE but did report collapsed RLL 

w/ surrounding fluid, bony sclerotic metastasis.patient admitted for further 

workup and care.


Allergies/Adverse Reactions: 


                                        











Allergy/AdvReac Type Severity Reaction Status Date / Time


 


amitriptyline Allergy  Confusion Verified 11/05/20 06:25


 


hydrochlorothiazide Allergy  Hives Verified 11/04/20 22:58


 


valsartan Allergy  Hives Verified 11/04/20 22:58


 


ACE Inhibitors AdvReac  Hives Verified 11/04/20 22:58











Home Medications: 


                                        











 Medication  Instructions  Recorded  Confirmed  Type


 


Tamsulosin HCl [Flomax] 0.4 mg PO BID #60 cap 07/19/18 01/29/21 Rx


 


Cyanocobalamin (Vitamin B-12) 1,000 mcg PO HS 08/11/20 01/29/21 History





[Vitamin B-12]    


 


Carvedilol [Coreg] 3.125 mg PO BID #60 tab 08/17/20 11/04/20 Rx


 


Aspirin 81 mg PO MDD 8/20/20 11/04/20 11/04/20 History


 


Bisacodyl [Dulcolax] 5 mg PO DAILY PRN 11/04/20 01/29/21 History


 


Docusate [Colace] 100 mg PO DAILY PRN 11/04/20 01/29/21 History


 


ALButerol Sulfate [Ventolin Neb] 1 inh INH Q4HR PRN 01/29/21 01/29/21 History


 


Acetaminophen [Tylenol Regular 650 mg PO Q4H PRN 01/29/21 01/29/21 History





Strength]    


 


Furosemide [Lasix] 40 tab PO DAILY 01/29/21 01/29/21 History


 


HYDROcodone Bit/APAP 5/325 [Norco] 1 tablet PO Q6HR PRN 01/29/21 01/29/21 

History


 


Midodrine 5 tablet PO TID 01/29/21 01/29/21 History


 


Ondansetron [Zofran ODT] 4 mg PO Q6H PRN 01/29/21 01/29/21 History


 


Pantoprazole Sodium 40 tablet PO QAM 01/29/21 01/29/21 History


 


Potassium Chloride 20 tablet PO DAILY 01/29/21 01/29/21 History











Past History: 


PMHx:   MI, HLD, HTN, kidney stones, metastatic prostate cancer





PSHx:   appendectomy





FHx:   reviewed, no relevant FH





Social:   Patient drinks socially, Patient denies drug use, Patient has no 

smoking history.








Hospitalist HPI ROS


Constitutional: reports: weakness, malaise.  denies: fever, chills, sweats, 

other


Eyes: denies: pain, vision change, conjunctivae inflammation, eyelid 

inflammation, redness, other


ENT: denies: ear pain, ear discharge, nose pain, nose discharge, nose 

congestion, mouth pain, mouth swelling, throat pain, throat swelling, other


Respiratory: reports: shortness of breath.  denies: cough, dry, hemoptysis, SOB 

with excertion, pleuritic pain, sputum, wheezing, other


Cardiovascular: reports: chest pain.  denies: palpitations, orthopnea, 

paroxysmal noc. dyspnea, edema, light headedness, other


Gastrointestinal: denies: nausea, vomiting, abdominal pain, diarrhea, 

constipation, melena, hematochezia, other


Genitourinary: denies: dysuria, frequency, incontinence, hematuria, retention, 

other


Musculoskeletal: denies: neck pain, shoulder pain, arm pain, back pain, hand 

pain, leg pain, foot pain, other


Skin: denies: rash, lesions, emmanuel, bruising, other


Neurological: denies: weakness, numbness, incoordination, change in speech, 

confusion, seizures, other


All other systems reviewed; all pertinent +/- noted in HPI/Subj





Hospitalist Exam


General Appearance: NAD, awake alert


Eye: PERRL, anicteric sclera


ENT: normocephalic atraumatic, no oropharyngeal lesions, moist mucosa


Neck: supple, symmetric, no JVD, no thyromegaly, no lymphadenopathy, no carotid 

bruit


Heart: RRR, no murmur, no gallops, no rubs, normal peripheral pulses


Respiratory: CTAB, no wheezes, no ronchi, normal chest expansion, no tachypnea, 

normal percussion, rales (bibasilar)


Gastrointestinal: soft, non-tender, non-distended, normal bowel sounds, no 

palpable masses, no hepatomegaly, no splenomegaly, no bruit


Extremities: no cyanosis, no clubbing, no edema


Skin: normal turgor, no lesions, no rashes


Neurological: cranial nerve grossly intact, normal sensation to touch, no 

weakness, no focal deficits, no new deficit


Musculoskeletal: normal tone, normal strength, no muscle wasting


Psychiatric: normal affect, normal behavior, A&O x 3





Hospitalist Results


Lab results: 


                             Laboratory Last Values











Troponin I  0.034 ng/mL (< 0.028)  H  01/29/21  00:53    


 


Influenza A RNA INAAT  Not Detected  (NotDetected)   01/29/21  01:37    


 


Influenza B RNA INAAT  Not Detected  (NotDetected)   01/29/21  01:37    


 


SARS-CoV-2 Rap RNA(RT-PCR)  DETECTED  (NotDetected)  A*  01/29/21  01:37    











Additional comment: 





EKG atrial fibrillation, rate 120, no acute ST changes or AVB





Hospitalist H&P A/P


Plan: 


Patient is a 80 year old male with PMH MI, HLD, HTN, kidney stones, metastatic 

prostate cancer who presents as transfer from [place] ED for shortness of 

breath, covid, hypoxia.





# COVID 19 infection


# shortness of breath


Patient was diagnosed with COVID appx 1 weeks ago, he lives in nursing home. He 

reports chest congesion and swelling of legs bilaterally. In [place] ED, 

patient requiring 4L of O2, he was in afib w/ RVR rate 100-120, recieved 

cardizem drip with improvement of rate to 90s.COVIUD swab was monday. he denies 

fevers/chills/palpitations. labs w TnI 0.34>0.22. covid swab positive. CTA chest

performed, revealed no PE but did report collapsed RLL w/ surrounding fluid, 

bony sclerotic metastasis.patient admitted for further workup and care.


- admit to floor


- steroids, antibiotics empirically


- consult cardiology for new DX of atrial fibrillation


- beta blocker, aspirin





# atrial fibrillation w/ RVR - patient rate controlled on cardizem drip


- continue cardizem 5mg/hr





# HTN


# HLD


# metastatic prostate cancer


 -resume home medications as appropriate





DVT/GI ppx

## 2021-01-29 NOTE — CON
DATE OF CONSULTATION:  01/29/2021



INDICATION FOR CONSULTATION:  A 80-year-old gentleman with new onset atrial

fibrillation with rapid ventricular response. 



HISTORY OF PRESENT ILLNESS:  This is a very unfortunate 80-year-old gentleman who

was seen back in September by me.  He has history of some cardiomyopathy with

decreased ejection fraction at that time by stress test and echocardiogram.  The

stress test was performed in August of 2020, which showed no evidence of ischemia,

but had ejection fraction of around 31%.  He had an echocardiogram, which showed

ejection fraction about 40% to 45% with mild to moderate left atrial dilatation.  He

has a long history of metastatic prostate cancer several years now.  He has refused

some of the treatments, and he has significant metastasis to the thoracic spine and

the rib areas.  He also has a history of hypertension and hypercholesterolemia.

There is some history of myocardial infarction, but I cannot find this document in

the records and the patient is unaware of any history of myocardial infarction.  At

this time, he resides in a nursing home.  He was tested positive for COVID.  In the

last several days, he has noted increasing shortness of breath and lower extremity

edema.  He was taken to the emergency room and was found to have atrial fibrillation

with rapid ventricular response.  He is requiring 3 to 4 L of oxygen to keep an O2

saturation about 96%.  He is somewhat hypotensive, but otherwise is responsive to

questions and is coughing throughout the examination.  At this time, we were asked

to see him to assist with the atrial fibrillation.  He has already been placed on IV

diltiazem, is also taking p.o. Coreg, and his renal function appears to be stable.

We may need to add other medications, but at this time it is somewhat difficult due

to the hypotension to increase the diltiazem or the beta blockers.  We may need to

adjust diltiazem for better rate control. 



PAST MEDICAL HISTORY:  Significant for the hypertension, history of anemia, prostate

cancer with metastasis, cardiomyopathy with decreased ejection fraction, history of

anemia, and history of hypertension. 



SOCIAL HISTORY:  He lives in a nursing home.  There is no tobacco abuse.  Has

occasional alcohol. 



REVIEW OF SYSTEMS:  Negative except for the following, which are positive.  He has

shortness of breath, dyspnea on exertion, fatigue, occasional chest discomfort, and

leg weakness, but he says at this time, the chest discomfort is due to his coughing.

 He is unable to get out of the bed.  He cannot stand.  He has metastasis from the

spine area due to his prostate and in the thoracic area, and the lower extremities,

he is unable to move. 



MEDICATIONS:  Include;

1. Flomax.

2. Lovenox, he is only taking 40 mg subcu a day for DVT prophylaxis.

3. Tylenol.

4. Colchicine.

5. Coreg 3.125 mg b.i.d.

6. IV diltiazem at 5 mg an hour.

7. Pepcid.

8. Azithromycin.

9. Aspirin 81 mg a day.



FAMILY HISTORY:  Noncontributory.



ALLERGIES:  HE IS ALLERGIC TO ACE INHIBITORS, ARBS, AMITRIPTYLINE, AND

HYDROCHLOROTHIAZIDE. 



LABORATORY DATA:  Show a slight increase in the troponin-I, which is not high enough

to consider this to be a myocardial infarction.  It was 0.034, then decreased down

to 0.022, not indicative of myocardial infarction.  Alkaline phosphatase was 166.

His BNP was 1099.  Sodium was 140, potassium 4.2, chloride 106, his bicarb was 24,

BUN of 25, creatinine 0.98, with a blood sugar of 81.  WBC is 7.6, hemoglobin 10.7,

the platelet count was 237,000.  Please note that in November of 2020, his

hemoglobin was down to 7.1, I believe he has received blood transfusion and now is

back up to 10.7, but continues to appear to drift downward.  He had a CT scan, which

showed metastasis to the bones.  He also has what appeared to be a right lower lobe

collapse and cardiomyopathy.  Also noted on CT scan was enlargement of the left

adrenal gland, which was thought to be possibly due to metastasis, this will need to

be followed up in the future if indicated. 



PHYSICAL EXAMINATION:

GENERAL:  Reveals a well-developed, well-nourished gentleman.  He is in no acute

distress.  He did cough throughout the examination as noted above. 

VITAL SIGNS:  Blood pressure was 89/58, heart rate was 96 and irregular.  He is

afebrile.  Respiratory rate is 20, O2 saturations are 96% on 4 L. 

HEENT:  Shows the head to be normocephalic and atraumatic.  Carotid pulses are

present.  I cannot hear any bruits. 

CHEST:  Had diffuse rhonchi throughout.  He continued to cough on my evaluation. 

CARDIOVASCULAR:  Heart sounds are somewhat distant, but also were irregularly

irregular.  I do not hear any gross murmurs. 

ABDOMEN:  Shows obesity.  Positive bowel sounds are present. 

EXTREMITIES:  Show 2+ lower extremity edema from the knees down to the feet.  Pulses

are difficult to palpate. 

NEUROLOGIC:  The patient appears to be intact as far as mentally is concerned.

However, he is unable to get out of the bed.  He says he is unable to move his lower

extremities. 

SKIN:  Warm and dry.



DIAGNOSTIC STUDIES:  His EKG showed atrial fibrillation with no acute changes to

indicate ischemia, but does show atrial fibrillation.  He did have some nonspecific

ST-segment changes in the anterior leads, but there is no ST-segment elevation. 



IMPRESSION:  

1. A 80-year-old gentleman, with new onset atrial fibrillation with rapid

ventricular response.  He has been on IV diltiazem and also Coreg with somewhat

decrease in his blood pressure.  We will try now to control the rate with digoxin.

This is not unusual finding with these patients who developed COVID, and that since

he also has history of cardiomyopathy, it is quite frequently they do go into atrial

fibrillation.  Hopefully, we will control the rate.  May need to increase the dose

of his Lovenox; however, with his anemia that we will need to watch him very

carefully for any evidence of underlying bleeding. 

2. History of cardiomyopathy.  As far as I can determine, he has had no history of

coronary artery disease.  This may just be a cardiomyopathy of uncertain etiology.

He had a negative stress test in August of 2020 and he denies any history of

myocardial infarction in the past.  He has not had a cardiac catheterization as the

stress test did not show any evidence of reversible ischemia. 

3. History of prostate cancer with metastasis.  He has been seen by the oncologist

and he has been noncompliant with treatment therapies. 

4. History of hypertension.  He is now hypotensive.  He also has a history of

orthostatic hypotension, has been on midodrine in the past.  I think this is one of

his medications while he was in the nursing home prior to being transferred to our

facility. 

5. Anemia, which is multifactorial, needs to be followed very carefully since we

will be increasing his Lovenox. 

6. He is a COVID positive patient.

7. Overall poor prognosis in this patient with ongoing metastatic prostate cancer,

which has been present from at least I believe 2018 with multiple medical problems,

cardiomyopathy, and now atrial fibrillation.  It would be good to have an

echocardiogram on this patient; however, he is COVID positive and we will try to

defer that for at least another likely 7 to 10 days. 







Job ID:  [id number]

## 2021-01-30 LAB
ANALYZER IN CARDIO: (no result)
ANION GAP SERPL CALC-SCNC: 15 MMOL/L (ref 10–20)
BASE EXCESS STD BLDA CALC-SCNC: -0.7 MEQ/L
BASOPHILS # BLD AUTO: 0 THOU/UL (ref 0–0.2)
BASOPHILS NFR BLD AUTO: 0.2 % (ref 0–1)
BUN SERPL-MCNC: 25 MG/DL (ref 8.4–25.7)
CA-I BLDA-SCNC: 1.07 MMOL/L (ref 1.12–1.3)
CALCIUM SERPL-MCNC: 7.3 MG/DL (ref 7.8–10.44)
CHLORIDE SERPL-SCNC: 106 MMOL/L (ref 98–107)
CO2 SERPL-SCNC: 22 MMOL/L (ref 23–31)
CREAT CL PREDICTED SERPL C-G-VRATE: 96 ML/MIN (ref 70–130)
EOSINOPHIL # BLD AUTO: 0 THOU/UL (ref 0–0.7)
EOSINOPHIL NFR BLD AUTO: 0.1 % (ref 0–10)
GLUCOSE SERPL-MCNC: 129 MG/DL (ref 83–110)
HCO3 BLDA-SCNC: 23.5 MEQ/L (ref 22–28)
HCT VFR BLDA CALC: 31 % (ref 42–52)
HGB BLD-MCNC: 9 G/DL (ref 14–18)
HGB BLDA-MCNC: 10.4 G/DL (ref 14–18)
INR PPP: 1.1
LYMPHOCYTES # BLD: 0.8 THOU/UL (ref 1.2–3.4)
LYMPHOCYTES NFR BLD AUTO: 17 % (ref 21–51)
MAGNESIUM SERPL-MCNC: 2.2 MG/DL (ref 1.6–2.6)
MCH RBC QN AUTO: 29.6 PG (ref 27–31)
MCV RBC AUTO: 97 FL (ref 78–98)
MONOCYTES # BLD AUTO: 0.2 THOU/UL (ref 0.11–0.59)
MONOCYTES NFR BLD AUTO: 4.2 % (ref 0–10)
NEUTROPHILS # BLD AUTO: 3.5 THOU/UL (ref 1.4–6.5)
NEUTROPHILS NFR BLD AUTO: 78.4 % (ref 42–75)
PCO2 BLDA: 36.8 MMHG (ref 35–45)
PH BLDA: 7.42 [PH] (ref 7.35–7.45)
PLATELET # BLD AUTO: 213 THOU/UL (ref 130–400)
PO2 BLDA: 117.6 MMHG (ref 70–?)
POTASSIUM BLD-SCNC: 3.93 MMOL/L (ref 3.7–5.3)
POTASSIUM SERPL-SCNC: 4.5 MMOL/L (ref 3.5–5.1)
PROTHROMBIN TIME: 14.8 SEC (ref 12–14.7)
RBC # BLD AUTO: 3.05 MILL/UL (ref 4.7–6.1)
SODIUM SERPL-SCNC: 138 MMOL/L (ref 136–145)
SPECIMEN DRAWN FROM PATIENT: (no result)
WBC # BLD AUTO: 4.4 THOU/UL (ref 4.8–10.8)

## 2021-01-30 PROCEDURE — XW13325 TRANSFUSION OF CONVALESCENT PLASMA (NONAUTOLOGOUS) INTO PERIPHERAL VEIN, PERCUTANEOUS APPROACH, NEW TECHNOLOGY GROUP 5: ICD-10-PCS | Performed by: INTERNAL MEDICINE

## 2021-01-30 RX ADMIN — ASPIRIN 81 MG CHEWABLE TABLET SCH MG: 81 TABLET CHEWABLE at 20:22

## 2021-01-30 RX ADMIN — REMDESIVIR SCH MLS: 100 INJECTION, POWDER, LYOPHILIZED, FOR SOLUTION INTRAVENOUS at 09:11

## 2021-01-30 RX ADMIN — AZITHROMYCIN SCH MLS: 500 INJECTION, POWDER, LYOPHILIZED, FOR SOLUTION INTRAVENOUS at 05:16

## 2021-01-30 RX ADMIN — Medication SCH MG: at 08:18

## 2021-01-30 NOTE — PDOC.CPN
- Subjective


Date: 01/30/21


Time: 16:48


Interval history: 





he had a code green last night were he had to have high flow O2 placed as his 

sats went down to 60's and became altered. Better now. Currently back in sinus. 





- Objective


Allergies/Adverse Reactions: 


                                    Allergies











Allergy/AdvReac Type Severity Reaction Status Date / Time


 


amitriptyline Allergy  Confusion Verified 11/05/20 06:25


 


hydrochlorothiazide Allergy  Hives Verified 11/04/20 22:58


 


valsartan Allergy  Hives Verified 11/04/20 22:58


 


ACE Inhibitors AdvReac  Hives Verified 11/04/20 22:58











Visit Medications: 


                               Current Medications





Acetaminophen (Acetaminophen 325 Mg Tab)  650 mg PO Q4H PRN


   PRN Reason: Headache/Fever/Mild Pain (1-3)


Hydrocodone Bitart/Acetaminophen (Hydrocodone/Acetaminophen 5/325 Mg Tablet)  1 

tab PO Q4H PRN


   PRN Reason: Moderate Pain (4-6)


Albuterol/Ipratropium (Ipratropium/Albuterol Sulfate 3 Ml Neb)  3 ml NEB [id number]

PRN


   PRN Reason: SOB &/or Wheezing


Ascorbic Acid (Ascorbic Acid 500 Mg Chewable Tablet)  1,000 mg PO DAILY [facility]


   Last Admin: 01/30/21 08:18 Dose:  1,000 mg


   Documented by: 


Aspirin (Aspirin Chewable 81 Mg Tab)  81 mg PO HS [facility]


   Last Admin: 01/29/21 21:12 Dose:  81 mg


   Documented by: 


Bisacodyl (Bisacodyl 5 Mg Tab)  5 mg PO DAILY PRN


   PRN Reason: Constipation


Carvedilol (Carvedilol 3.125 Mg Tab)  3.125 mg PO BID [facility]


   Last Admin: 01/30/21 08:19 Dose:  3.125 mg


   Documented by: 


Clonidine (Clonidine 0.1 Mg Tab)  0.1 mg PO BID PRN


   PRN Reason: SBP > 160 use second


Colchicine (Colchicine 0.6 Mg Tab)  0.6 mg PO BID [facility]


   Last Admin: 01/30/21 08:19 Dose:  0.6 mg


   Documented by: 


Dexamethasone (Dexamethasone 4 Mg/Ml Vial)  6 mg SLOW IVP DAILY [facility]


   Last Admin: 01/30/21 08:17 Dose:  6 mg


   Documented by: 


Digoxin (Digoxin 0.125 Mg Tab)  0.125 mg PO QAM [facility]


   Last Admin: 01/30/21 09:12 Dose:  0.125 mg


   Documented by: 


Enoxaparin Sodium (Enoxaparin Sodium 80 Mg/0.8 Ml Syringe)  80 mg SC 0900,2100 

[facility]


   Last Admin: 01/30/21 08:18 Dose:  80 mg


   Documented by: 


Famotidine (Famotidine 20 Mg Tab)  20 mg PO BID [facility]


   Last Admin: 01/30/21 08:18 Dose:  20 mg


   Documented by: 


Furosemide (Furosemide 40 Mg Tab)  40 mg PO DAILY-[facility]


   Last Admin: 01/30/21 08:19 Dose:  40 mg


   Documented by: 


Guaifenesin/Dextromethorphan (Guaifenesin Dm 100-10/5 Ml Udcup)  15 ml PO Q4H 

PRN


   PRN Reason: Cough


   Last Admin: 01/29/21 11:37 Dose:  15 ml


   Documented by: 


Hydralazine HCl (Hydralazine 20 Mg/Ml Vial)  10 mg SLOW IVP Q6H PRN


   PRN Reason: SBP GREATER THAN 160


Promethazine HCl 12.5 mg/ (Sodium Chloride)  50.5 mls @ 202 mls/hr IVPB Q6H PRN


   PRN Reason: Nausea/vomiting use second


Diltiazem HCl 125 mg/ Sodium (Chloride)  125 mls @ 5 mls/hr IVPB INF [facility]; 

Protocol


   Last Admin: 01/29/21 19:11 Dose:  125 mls


   Documented by: 


Azithromycin 500 mg/ Sodium (Chloride)  250 mls @ 250 mls/hr IVPB 0500 [facility]


   Last Admin: 01/30/21 05:16 Dose:  250 mls


   Documented by: 


Remdesivir 100 mg/ Sodium (Chloride)  250 mls @ 250 mls/hr IV 1000 [facility]


   Stop: 02/02/21 10:59


   Last Admin: 01/30/21 09:11 Dose:  250 mls


   Documented by: 


Labetalol HCl (Labetalol Hcl 100 Mg/20 Ml Vial)  20 mg SLOW IVP Q4H PRN


   PRN Reason: SBP > 160 use first


Miscellaneous Medication (Electrolyte Replacement Protocol)  0 each FS ASDIR 

PRN; Protocol


   PRN Reason: ELECTROLYTE REPLACEMENT


Morphine Sulfate (Morphine 2 Mg/Ml Vial)  2 mg SLOW IVP Q4H PRN


   PRN Reason: severe pain 4-10


Ondansetron HCl (Ondansetron Pf 4 Mg/2 Ml Vial)  4 mg IVP Q6H PRN


   PRN Reason: Nausea/Vomiting use 1st


Polyethylene Glycol (Polyethylene Glycol 3350 17 Gm Packet)  17 gm PO DAILY [facility]


   Last Admin: 01/30/21 09:12 Dose:  Not Given


   Documented by: 


Potassium Chloride (Potassium Chloride 8 Meq Tab)  8 meq PO QAM-WM [facility]


   Last Admin: 01/30/21 08:24 Dose:  8 meq


   Documented by: 


Tamsulosin HCl (Tamsulosin Hcl 0.4 Mg Cap)  0.4 mg PO BID [facility]


   Last Admin: 01/30/21 08:19 Dose:  0.4 mg


   Documented by: 


Zinc Sulfate (Zinc Sulfate 220 Mg Cap)  220 mg PO DAILY [facility]


   Last Admin: 01/30/21 08:18 Dose:  220 mg


   Documented by: 








Vital Signs & Weight: 


                                   Vital Signs











  Temp Pulse Resp BP Pulse Ox


 


 01/30/21 15:30  98.8 F  75  26 H  85/48 L  92 L


 


 01/30/21 09:12   70   


 


 01/30/21 08:00  97.7 F  71  20  117/60  99


 


 01/30/21 05:17   77   








                                        











Weight                         201 lb

















- Physical Exam


General: other (Not done due to acute COVID 19 infection)





- Labs


Result Diagrams: 


                                 01/30/21 04:12





                                 01/30/21 04:12


                                  Troponin/CKMB











Troponin I  0.022 ng/mL (< 0.028)   01/29/21  04:24    














- Telemetry


Sinus rhythms and dysrhythmias: sinus rhythm





- Assessment/Plan


Assessment/Plan: 





1. New onset Afib, back in sinus


2. COVID-19 pneumponia


3. Cardiomyopathy EF at 40-45% in August 2020


4. Prostate Cancer, metastatic








PLAN:


- Currently back in sinus rhythm. 


- Would continue full anticoagulation with SQ lovenox.


- Continue BB for now. Will stop digoxin as he is back in sinus and bradycardic.

## 2021-01-30 NOTE — RAD
RADIOGRAPH CHEST 1 VIEW:



DATE:

1/30/2021



TIME:

12:33 AM



HISTORY:

80-year-old male with hypoxia



COMPARISON:

11/9/2020



FINDINGS:

New broad moderately dense opacification at right mid and lower lung zones, from right hilum to base.
 Indistinctness of right hemidiaphragm.

New multifocal small faint nodular infiltrates in bilateral upper, left mid, lung zones.

Effacement of bilateral lateral costophrenic angles.

No pneumothorax.

Numerous osteoblastic skeletal lesions.



IMPRESSION:



1. In addition to a few small focal bilateral nodular infiltrates, there is a large region of opacifi
cation at right mid and lower lung zones. Findings are evidence for pneumonia, possibly COVID 19

pneumonia.

2. Possible bilateral pleural effusions, right greater than left.

3. 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



Reported By: Rafiq Pandya 

Electronically Signed:  1/30/2021 9:16 AM

## 2021-01-30 NOTE — PDOC.HOSPP
- Subjective


Encounter Date: 01/30/21


Encounter Time: 08:30


Subjective: 


Patient seen in follow-up for acute hypoxic respiratory failure.  Denies chest 

pain.





- Objective


Vital Signs & Weight: 


                             Vital Signs (12 hours)











  Temp Pulse Resp BP Pulse Ox


 


 01/30/21 09:12   70   


 


 01/30/21 08:00  97.7 F  71  20  117/60  99


 


 01/30/21 05:17   77   








                                     Weight











Weight                         201 lb














I&O: 


                                        











 01/29/21 01/30/21 01/31/21





 06:59 06:59 06:59


 


Intake Total  180 


 


Output Total  300 


 


Balance  -120 











Result Diagrams: 


                                 01/30/21 04:12





                                 01/30/21 04:12


Additional Labs: 


I reviewed patient's labs and MAR


EKG Reviewed by me: Yes (Normal sinus rhythm on telemetry)





Hospitalist ROS





- Review of Systems


Constitutional: reports: weakness


Respiratory: reports: cough, SOB with excertion, sputum


Cardiovascular: denies: chest pain, palpitations, orthopnea, paroxysmal noc. 

dyspnea, edema, light headedness





- Medication


Medications: 


Active Medications











Generic Name Dose Route Start Last Admin





  Trade Name Freq  PRN Reason Stop Dose Admin


 


Ascorbic Acid  1,000 mg  01/29/21 09:00  01/30/21 08:18





  Ascorbic Acid 500 Mg Chewable Tablet  PO   1,000 mg





  DAILY AMANDA   Administration


 


Aspirin  81 mg  01/29/21 21:00  01/29/21 21:12





  Aspirin Chewable 81 Mg Tab  PO   81 mg





  HS AMANDA   Administration


 


Carvedilol  3.125 mg  01/29/21 09:00  01/30/21 08:19





  Carvedilol 3.125 Mg Tab  PO   3.125 mg





  BID AMANDA   Administration


 


Colchicine  0.6 mg  01/29/21 09:00  01/30/21 08:19





  Colchicine 0.6 Mg Tab  PO   0.6 mg





  BID AMANDA   Administration


 


Dexamethasone  6 mg  01/29/21 09:00  01/30/21 08:17





  Dexamethasone 4 Mg/Ml Vial  SLOW IVP   6 mg





  DAILY AMANDA   Administration


 


Digoxin  0.125 mg  01/30/21 09:00  01/30/21 09:12





  Digoxin 0.125 Mg Tab  PO   0.125 mg





  QAM AMANDA   Administration


 


Enoxaparin Sodium  80 mg  01/29/21 21:00  01/30/21 08:18





  Enoxaparin Sodium 80 Mg/0.8 Ml Syringe  SC   80 mg





  0900,2100 AMANDA   Administration


 


Famotidine  20 mg  01/29/21 09:00  01/30/21 08:18





  Famotidine 20 Mg Tab  PO   20 mg





  BID AMANDA   Administration


 


Furosemide  40 mg  01/30/21 07:30  01/30/21 08:19





  Furosemide 40 Mg Tab  PO   40 mg





  DAILY-AC AMANDA   Administration


 


Guaifenesin/Dextromethorphan  15 ml  01/29/21 03:46  01/29/21 11:37





  Guaifenesin Dm 100-10/5 Ml Udcup  PO   15 ml





  Q4H PRN   Administration





  Cough  


 


Diltiazem HCl 125 mg/ Sodium  125 mls @ 5 mls/hr  01/29/21 04:00  01/29/21 19:11





  Chloride  IVPB   125 mls





  INF AMANDA   Administration





  Protocol  





  5 MG/HR  


 


Azithromycin 500 mg/ Sodium  250 mls @ 250 mls/hr  01/29/21 05:00  01/30/21 

05:16





  Chloride  IVPB   250 mls





  0500 AMANDA   Administration


 


Remdesivir 100 mg/ Sodium  250 mls @ 250 mls/hr  01/30/21 10:00  01/30/21 09:11





  Chloride  IV  02/02/21 10:59  250 mls





  1000 AMANDA   Administration


 


Polyethylene Glycol  17 gm  01/29/21 09:00  01/30/21 09:12





  Polyethylene Glycol 3350 17 Gm Packet  PO   Not Given





  DAILY AMANDA  


 


Potassium Chloride  8 meq  01/30/21 08:00  01/30/21 08:24





  Potassium Chloride 8 Meq Tab  PO   8 meq





  QAM-WM AMANDA   Administration


 


Tamsulosin HCl  0.4 mg  01/29/21 09:00  01/30/21 08:19





  Tamsulosin Hcl 0.4 Mg Cap  PO   0.4 mg





  BID AMANDA   Administration


 


Zinc Sulfate  220 mg  01/29/21 09:00  01/30/21 08:18





  Zinc Sulfate 220 Mg Cap  PO   220 mg





  DAILY AMANDA   Administration














Hospitalist Exam


Vitals: 


                             Vital Signs (12 hours)











  Temp Pulse Resp BP Pulse Ox


 


 01/30/21 09:12   70   


 


 01/30/21 08:00  97.7 F  71  20  117/60  99


 


 01/30/21 05:17   77   








                                     Weight











Weight                         201 lb














General Appearance: awake alert


Eye: anicteric sclera


ENT: normocephalic atraumatic


Neck: supple


Heart: RRR


Respiratory: rhonchi


Gastrointestinal: soft


Skin: no rashes


Psychiatric: normal affect, normal behavior





Hosp A/P





- Plan





#Acute hypoxic respiratory failure


# COVID 19 infection


Continue dexamethasone.


Patient has been started on remdesivir.


Continue vitamin C and zinc.


Follow inflammatory markers.


Patient to receive convalescent plasma





# HTN


Controlled and stable, continue Coreg.





# metastatic prostate cancer


 -Stable





# atrial fibrillation w/ RVR - patient rate controlled on cardizem drip


-Resolved


Continue anticoagulation with Lovenox

## 2021-01-31 LAB
ANION GAP SERPL CALC-SCNC: 12 MMOL/L (ref 10–20)
BASOPHILS # BLD AUTO: 0 THOU/UL (ref 0–0.2)
BASOPHILS NFR BLD AUTO: 0 % (ref 0–1)
BUN SERPL-MCNC: 27 MG/DL (ref 8.4–25.7)
CALCIUM SERPL-MCNC: 7.4 MG/DL (ref 7.8–10.44)
CHLORIDE SERPL-SCNC: 107 MMOL/L (ref 98–107)
CO2 SERPL-SCNC: 22 MMOL/L (ref 23–31)
CREAT CL PREDICTED SERPL C-G-VRATE: 86 ML/MIN (ref 70–130)
EOSINOPHIL # BLD AUTO: 0 THOU/UL (ref 0–0.7)
EOSINOPHIL NFR BLD AUTO: 0.5 % (ref 0–10)
GLUCOSE SERPL-MCNC: 95 MG/DL (ref 83–110)
HGB BLD-MCNC: 9.8 G/DL (ref 14–18)
LYMPHOCYTES # BLD: 1.1 THOU/UL (ref 1.2–3.4)
LYMPHOCYTES NFR BLD AUTO: 17.6 % (ref 21–51)
MAGNESIUM SERPL-MCNC: 2.1 MG/DL (ref 1.6–2.6)
MCH RBC QN AUTO: 29.6 PG (ref 27–31)
MCV RBC AUTO: 97.3 FL (ref 78–98)
MONOCYTES # BLD AUTO: 0.3 THOU/UL (ref 0.11–0.59)
MONOCYTES NFR BLD AUTO: 5.2 % (ref 0–10)
NEUTROPHILS # BLD AUTO: 4.9 THOU/UL (ref 1.4–6.5)
NEUTROPHILS NFR BLD AUTO: 76.6 % (ref 42–75)
PLATELET # BLD AUTO: 228 THOU/UL (ref 130–400)
POTASSIUM SERPL-SCNC: 3.9 MMOL/L (ref 3.5–5.1)
RBC # BLD AUTO: 3.3 MILL/UL (ref 4.7–6.1)
SODIUM SERPL-SCNC: 137 MMOL/L (ref 136–145)
WBC # BLD AUTO: 6.4 THOU/UL (ref 4.8–10.8)

## 2021-01-31 RX ADMIN — REMDESIVIR SCH MLS: 100 INJECTION, POWDER, LYOPHILIZED, FOR SOLUTION INTRAVENOUS at 12:40

## 2021-01-31 RX ADMIN — Medication SCH MG: at 09:04

## 2021-01-31 RX ADMIN — ASPIRIN 81 MG CHEWABLE TABLET SCH MG: 81 TABLET CHEWABLE at 20:48

## 2021-01-31 RX ADMIN — AZITHROMYCIN SCH MLS: 500 INJECTION, POWDER, LYOPHILIZED, FOR SOLUTION INTRAVENOUS at 04:43

## 2021-01-31 NOTE — PDOC.HOSPP
- Subjective


Encounter Date: 01/31/21


Subjective: 


The patient is resting comfortably.


He does not appear to be in any respiratory distress.








- Objective


Vital Signs & Weight: 


                             Vital Signs (12 hours)











  Temp Pulse Pulse Pulse Resp Resp BP


 


 01/31/21 08:00       


 


 01/31/21 07:39  98.2 F  77    20  


 


 01/31/21 04:00   65    20  


 


 01/31/21 02:50       


 


 01/31/21 00:52    76  85   18  115/68














  BP Pulse Ox Pulse Ox


 


 01/31/21 08:00   100 


 


 01/31/21 07:39  106/59 L  100 


 


 01/31/21 04:00  99/67  98 


 


 01/31/21 02:50   100 


 


 01/31/21 00:52    97








                                     Weight











Weight                         201 lb














I&O: 


                                        











 01/30/21 01/31/21 02/01/21





 06:59 06:59 06:59


 


Intake Total 180  


 


Output Total 300  


 


Balance -120  











Result Diagrams: 


                                 01/31/21 04:50





                                 01/31/21 04:50





Hospitalist ROS





- Medication


Medications: 


Active Medications











Generic Name Dose Route Start Last Admin





  Trade Name Freq  PRN Reason Stop Dose Admin


 


Ascorbic Acid  1,000 mg  01/29/21 09:00  01/31/21 09:04





  Ascorbic Acid 500 Mg Chewable Tablet  PO   1,000 mg





  DAILY AMANDA   Administration


 


Aspirin  81 mg  01/29/21 21:00  01/30/21 20:22





  Aspirin Chewable 81 Mg Tab  PO   81 mg





  HS AMANDA   Administration


 


Carvedilol  3.125 mg  01/29/21 09:00  01/31/21 09:06





  Carvedilol 3.125 Mg Tab  PO   3.125 mg





  BID AMANDA   Administration


 


Colchicine  0.6 mg  01/29/21 09:00  01/31/21 09:04





  Colchicine 0.6 Mg Tab  PO   0.6 mg





  BID AMANDA   Administration


 


Dexamethasone  6 mg  01/29/21 09:00  01/31/21 09:05





  Dexamethasone 4 Mg/Ml Vial  SLOW IVP   6 mg





  DAILY AMANDA   Administration


 


Enoxaparin Sodium  80 mg  01/29/21 21:00  01/31/21 09:04





  Enoxaparin Sodium 80 Mg/0.8 Ml Syringe  SC   80 mg





  0900,2100 AMANDA   Administration


 


Famotidine  20 mg  01/29/21 09:00  01/31/21 09:04





  Famotidine 20 Mg Tab  PO   20 mg





  BID AMANDA   Administration


 


Furosemide  40 mg  01/30/21 07:30  01/31/21 09:06





  Furosemide 40 Mg Tab  PO   40 mg





  DAILY-AC AMANDA   Administration


 


Guaifenesin/Dextromethorphan  15 ml  01/29/21 03:46  01/29/21 11:37





  Guaifenesin Dm 100-10/5 Ml Udcup  PO   15 ml





  Q4H PRN   Administration





  Cough  


 


Azithromycin 500 mg/ Sodium  250 mls @ 250 mls/hr  01/29/21 05:00  01/31/21 

04:43





  Chloride  IVPB   250 mls





  0500 AMANDA   Administration


 


Remdesivir 100 mg/ Sodium  250 mls @ 250 mls/hr  01/30/21 10:00  01/30/21 09:11





  Chloride  IV  02/02/21 10:59  250 mls





  1000 AMANDA   Administration


 


Polyethylene Glycol  17 gm  01/29/21 09:00  01/31/21 09:04





  Polyethylene Glycol 3350 17 Gm Packet  PO   17 gm





  DAILY AMANDA   Administration


 


Potassium Chloride  8 meq  01/30/21 08:00  01/31/21 09:05





  Potassium Chloride 8 Meq Tab  PO   8 meq





  QAM-WM AMANDA   Administration


 


Tamsulosin HCl  0.4 mg  01/29/21 09:00  01/31/21 09:06





  Tamsulosin Hcl 0.4 Mg Cap  PO   0.4 mg





  BID AMANDA   Administration


 


Zinc Sulfate  220 mg  01/29/21 09:00  01/31/21 09:05





  Zinc Sulfate 220 Mg Cap  PO   220 mg





  DAILY AMANDA   Administration














Hospitalist Exam


Vitals: 


                             Vital Signs (12 hours)











  Temp Pulse Pulse Pulse Resp Resp BP


 


 01/31/21 08:00       


 


 01/31/21 07:39  98.2 F  77    20  


 


 01/31/21 04:00   65    20  


 


 01/31/21 02:50       


 


 01/31/21 00:52    76  85   18  115/68














  BP Pulse Ox Pulse Ox


 


 01/31/21 08:00   100 


 


 01/31/21 07:39  106/59 L  100 


 


 01/31/21 04:00  99/67  98 


 


 01/31/21 02:50   100 


 


 01/31/21 00:52    97








                                     Weight











Weight                         201 lb














General Appearance: awake alert


Neck: supple


Extremities: no cyanosis, no edema





Hosp A/P


(1) Acute respiratory failure


Code(s): J96.00 - ACUTE RESPIRATORY FAILURE, UNSP W HYPOXIA OR HYPERCAPNIA   Sta

tus: Resolved   





(2) COVID-19 virus infection


Code(s): U07.1 - COVID-19   Status: Acute   





(3) Atrial fibrillation with rapid ventricular response


Code(s): I48.91 - UNSPECIFIED ATRIAL FIBRILLATION   Status: Acute   





(4) CKD (chronic kidney disease), stage III


Code(s): N18.3 - CHRONIC KIDNEY DISEASE, STAGE 3 (MODERATE) * DO NOT USE *   

Status: Acute   





(5) HTN (hypertension)


Code(s): I10 - ESSENTIAL (PRIMARY) HYPERTENSION   Status: Chronic   


Qualifiers: 


   Hypertension type: essential hypertension   Qualified Code(s): I10 - 

Essential (primary) hypertension   





- Plan





The patient is now in sinus rhythm.


Continue dexamethasone, enoxaparin, remdesivir, and antibiotics.


Patient is saturating 100% on 40% FiO2.


He could be weaned off to nasal cannula today.

## 2021-01-31 NOTE — PDOC.CPN
- Subjective


Date: 01/31/21


Time: 16:10


Interval history: 





No new issues. SOB improved. 





- Review of Systems


General: denies: fever/chills, weight/appetite/sleep changes, night sweats, 

fatigue


Respiratory: reports: shortness of breath.  denies: cough, congestion, exercise 

intolerance


Cardiovascular: denies: chest pain, palpitation, edema, paroxysmal nocturnal 

dyspnea, orthopnea


Gastrointestinal: denies: nausea, vomiting, diarrhea, constipation, abd pain, GI

bleeding


Musculoskeletal: denies: pain, tenderness, stiffness, swelling, 

arthritis/arthralgias


Neurological: denies: numbness, syncope, seizure, weakness





- Objective


Allergies/Adverse Reactions: 


                                    Allergies











Allergy/AdvReac Type Severity Reaction Status Date / Time


 


amitriptyline Allergy  Confusion Verified 11/05/20 06:25


 


hydrochlorothiazide Allergy  Hives Verified 11/04/20 22:58


 


valsartan Allergy  Hives Verified 11/04/20 22:58


 


ACE Inhibitors AdvReac  Hives Verified 11/04/20 22:58











Visit Medications: 


                               Current Medications





Acetaminophen (Acetaminophen 325 Mg Tab)  650 mg PO Q4H PRN


   PRN Reason: Headache/Fever/Mild Pain (1-3)


Hydrocodone Bitart/Acetaminophen (Hydrocodone/Acetaminophen 5/325 Mg Tablet)  1 

tab PO Q4H PRN


   PRN Reason: Moderate Pain (4-6)


Albuterol/Ipratropium (Ipratropium/Albuterol Sulfate 3 Ml Neb)  3 ml NEB [id number]

PRN


   PRN Reason: SOB &/or Wheezing


Ascorbic Acid (Ascorbic Acid 500 Mg Chewable Tablet)  1,000 mg PO DAILY [facility]


   Last Admin: 01/31/21 09:04 Dose:  1,000 mg


   Documented by: 


Aspirin (Aspirin Chewable 81 Mg Tab)  81 mg PO HS [facility]


   Last Admin: 01/30/21 20:22 Dose:  81 mg


   Documented by: 


Bisacodyl (Bisacodyl 5 Mg Tab)  5 mg PO DAILY PRN


   PRN Reason: Constipation


Carvedilol (Carvedilol 3.125 Mg Tab)  3.125 mg PO BID [facility]


   Last Admin: 01/31/21 09:06 Dose:  3.125 mg


   Documented by: 


Clonidine (Clonidine 0.1 Mg Tab)  0.1 mg PO BID PRN


   PRN Reason: SBP > 160 use second


Colchicine (Colchicine 0.6 Mg Tab)  0.6 mg PO BID [facility]


   Last Admin: 01/31/21 09:04 Dose:  0.6 mg


   Documented by: 


Dexamethasone (Dexamethasone 4 Mg/Ml Vial)  6 mg SLOW IVP DAILY [facility]


   Last Admin: 01/31/21 09:05 Dose:  6 mg


   Documented by: 


Enoxaparin Sodium (Enoxaparin Sodium 80 Mg/0.8 Ml Syringe)  80 mg SC 0900,2100 

[facility]


   Last Admin: 01/31/21 09:04 Dose:  80 mg


   Documented by: 


Famotidine (Famotidine 20 Mg Tab)  20 mg PO BID [facility]


   Last Admin: 01/31/21 09:04 Dose:  20 mg


   Documented by: 


Furosemide (Furosemide 40 Mg Tab)  40 mg PO DAILY[facility]


   Last Admin: 01/31/21 09:06 Dose:  40 mg


   Documented by: 


Guaifenesin/Dextromethorphan (Guaifenesin Dm 100-10/5 Ml Udcup)  15 ml PO Q4H 

PRN


   PRN Reason: Cough


   Last Admin: 01/29/21 11:37 Dose:  15 ml


   Documented by: 


Hydralazine HCl (Hydralazine 20 Mg/Ml Vial)  10 mg SLOW IVP Q6H PRN


   PRN Reason: SBP GREATER THAN 160


Promethazine HCl 12.5 mg/ (Sodium Chloride)  50.5 mls @ 202 mls/hr IVPB Q6H PRN


   PRN Reason: Nausea/vomiting use second


Azithromycin 500 mg/ Sodium (Chloride)  250 mls @ 250 mls/hr IVPB 0500 [facility]


   Last Admin: 01/31/21 04:43 Dose:  250 mls


   Documented by: 


Remdesivir 100 mg/ Sodium (Chloride)  250 mls @ 250 mls/hr IV 1000 [facility]


   Stop: 02/02/21 10:59


   Last Admin: 01/31/21 12:40 Dose:  250 mls


   Documented by: 


Labetalol HCl (Labetalol Hcl 100 Mg/20 Ml Vial)  20 mg SLOW IVP Q4H PRN


   PRN Reason: SBP > 160 use first


Miscellaneous Medication (Electrolyte Replacement Protocol)  0 each FS ASDIR 

PRN; Protocol


   PRN Reason: ELECTROLYTE REPLACEMENT


Morphine Sulfate (Morphine 2 Mg/Ml Vial)  2 mg SLOW IVP Q4H PRN


   PRN Reason: severe pain 4-10


Ondansetron HCl (Ondansetron Pf 4 Mg/2 Ml Vial)  4 mg IVP Q6H PRN


   PRN Reason: Nausea/Vomiting use 1st


Polyethylene Glycol (Polyethylene Glycol 3350 17 Gm Packet)  17 gm PO DAILY [facility]


   Last Admin: 01/31/21 09:04 Dose:  17 gm


   Documented by: 


Potassium Chloride (Potassium Chloride 8 Meq Tab)  8 meq PO QAM-WM [facility]


   Last Admin: 01/31/21 09:05 Dose:  8 meq


   Documented by: 


Tamsulosin HCl (Tamsulosin Hcl 0.4 Mg Cap)  0.4 mg PO BID [facility]


   Last Admin: 01/31/21 09:06 Dose:  0.4 mg


   Documented by: 


Zinc Sulfate (Zinc Sulfate 220 Mg Cap)  220 mg PO DAILY [facility]


   Last Admin: 01/31/21 09:05 Dose:  220 mg


   Documented by: 








Vital Signs & Weight: 


                                   Vital Signs











  Temp Pulse Resp BP Pulse Ox


 


 01/31/21 15:48  97.9 F  66  18  109/62  100


 


 01/31/21 11:37  97.3 F L  64  16  100/53 L  95


 


 01/31/21 11:28      95


 


 01/31/21 08:00      100


 


 01/31/21 07:39  98.2 F  77  20  106/59 L  100








                                        











Weight                         201 lb

















- Physical Exam


General: other (Not done due to active covid 19 infection.)





- Labs


Result Diagrams: 


                                 01/31/21 04:50





                                 01/31/21 04:50


                                  Troponin/CKMB











Troponin I  0.022 ng/mL (< 0.028)   01/29/21  04:24    














- Telemetry


Sinus rhythms and dysrhythmias: sinus rhythm





- Assessment/Plan


Assessment/Plan: 





1. New onset Afib, back in sinus


2. COVID-19 pneumonia


3. Cardiomyopathy EF at 40-45% in August 2020


4. Prostate Cancer, metastatic








PLAN:


- Remains in sinus rhythm. 


- Continue full anticoagulation with SQ lovenox.


- Continue BB for now. 


- Off dig due to bradycardic.

## 2021-02-01 LAB
ANALYZER IN CARDIO: (no result)
ANION GAP SERPL CALC-SCNC: 15 MMOL/L (ref 10–20)
BASE EXCESS STD BLDA CALC-SCNC: -3.5 MEQ/L
BASOPHILS # BLD AUTO: 0 THOU/UL (ref 0–0.2)
BASOPHILS NFR BLD AUTO: 0.1 % (ref 0–1)
BUN SERPL-MCNC: 32 MG/DL (ref 8.4–25.7)
CA-I BLDA-SCNC: 1.09 MMOL/L (ref 1.12–1.3)
CALCIUM SERPL-MCNC: 7.2 MG/DL (ref 7.8–10.44)
CHLORIDE SERPL-SCNC: 107 MMOL/L (ref 98–107)
CO2 SERPL-SCNC: 19 MMOL/L (ref 23–31)
CREAT CL PREDICTED SERPL C-G-VRATE: 84 ML/MIN (ref 70–130)
EOSINOPHIL # BLD AUTO: 0 THOU/UL (ref 0–0.7)
EOSINOPHIL NFR BLD AUTO: 0.2 % (ref 0–10)
GLUCOSE SERPL-MCNC: 83 MG/DL (ref 83–110)
HCO3 BLDA-SCNC: 20.5 MEQ/L (ref 22–28)
HCT VFR BLDA CALC: 31 % (ref 42–52)
HGB BLD-MCNC: 10.3 G/DL (ref 14–18)
HGB BLDA-MCNC: 10.6 G/DL (ref 14–18)
LYMPHOCYTES # BLD: 1.3 THOU/UL (ref 1.2–3.4)
LYMPHOCYTES NFR BLD AUTO: 15.2 % (ref 21–51)
MAGNESIUM SERPL-MCNC: 2.1 MG/DL (ref 1.6–2.6)
MCH RBC QN AUTO: 28.9 PG (ref 27–31)
MCV RBC AUTO: 96.3 FL (ref 78–98)
MONOCYTES # BLD AUTO: 0.4 THOU/UL (ref 0.11–0.59)
MONOCYTES NFR BLD AUTO: 5 % (ref 0–10)
NEUTROPHILS # BLD AUTO: 6.6 THOU/UL (ref 1.4–6.5)
NEUTROPHILS NFR BLD AUTO: 79.5 % (ref 42–75)
O2 A-A PPRESDIFF RESPIRATORY: 248.93 MMHG (ref 0–20)
PCO2 BLDA: 33.1 MMHG (ref 35–45)
PH BLDA: 7.41 [PH] (ref 7.35–7.45)
PLATELET # BLD AUTO: 229 THOU/UL (ref 130–400)
PO2 BLDA: 66.2 MMHG (ref 70–?)
POTASSIUM BLD-SCNC: 3.73 MMOL/L (ref 3.7–5.3)
POTASSIUM SERPL-SCNC: 3.7 MMOL/L (ref 3.5–5.1)
RBC # BLD AUTO: 3.56 MILL/UL (ref 4.7–6.1)
SODIUM SERPL-SCNC: 137 MMOL/L (ref 136–145)
SPECIMEN DRAWN FROM PATIENT: (no result)
WBC # BLD AUTO: 8.2 THOU/UL (ref 4.8–10.8)

## 2021-02-01 PROCEDURE — 3E033XZ INTRODUCTION OF VASOPRESSOR INTO PERIPHERAL VEIN, PERCUTANEOUS APPROACH: ICD-10-PCS | Performed by: INTERNAL MEDICINE

## 2021-02-01 RX ADMIN — MEROPENEM AND SODIUM CHLORIDE SCH MLS: 1 INJECTION, SOLUTION INTRAVENOUS at 16:13

## 2021-02-01 RX ADMIN — AZITHROMYCIN SCH MLS: 500 INJECTION, POWDER, LYOPHILIZED, FOR SOLUTION INTRAVENOUS at 05:06

## 2021-02-01 RX ADMIN — ASPIRIN 81 MG CHEWABLE TABLET SCH MG: 81 TABLET CHEWABLE at 21:12

## 2021-02-01 RX ADMIN — Medication SCH MG: at 09:44

## 2021-02-01 RX ADMIN — Medication SCH ML: at 21:11

## 2021-02-01 RX ADMIN — REMDESIVIR SCH MLS: 100 INJECTION, POWDER, LYOPHILIZED, FOR SOLUTION INTRAVENOUS at 09:33

## 2021-02-01 RX ADMIN — MEROPENEM AND SODIUM CHLORIDE SCH MLS: 1 INJECTION, SOLUTION INTRAVENOUS at 21:12

## 2021-02-01 NOTE — PDOC.HOSPP
- Subjective


Encounter Date: 02/01/21


Subjective: 





Respiratory distress was reported today





- Objective


Vital Signs & Weight: 


                             Vital Signs (12 hours)











  Temp Pulse Resp BP Pulse Ox


 


 02/01/21 08:00  98.0 F  80  26 H   94 L


 


 02/01/21 07:30      94 L


 


 02/01/21 05:05  98.7 F  71  28 H  100/55 L  100


 


 02/01/21 02:35      97








                                     Weight











Weight                         201 lb














Result Diagrams: 


                                 02/01/21 05:56





                                 02/01/21 05:56





Hospitalist ROS





- Medication


Medications: 


Active Medications











Generic Name Dose Route Start Last Admin





  Trade Name Freq  PRN Reason Stop Dose Admin


 


Ascorbic Acid  1,000 mg  01/29/21 09:00  02/01/21 09:44





  Ascorbic Acid 500 Mg Chewable Tablet  PO   1,000 mg





  DAILY AMANDA   Administration


 


Aspirin  81 mg  01/29/21 21:00  01/31/21 20:48





  Aspirin Chewable 81 Mg Tab  PO   81 mg





  HS AMANDA   Administration


 


Carvedilol  3.125 mg  01/29/21 09:00  02/01/21 09:44





  Carvedilol 3.125 Mg Tab  PO   3.125 mg





  BID AMANDA   Administration


 


Colchicine  0.6 mg  01/29/21 09:00  02/01/21 09:43





  Colchicine 0.6 Mg Tab  PO   0.6 mg





  BID AMANDA   Administration


 


Dexamethasone  6 mg  01/29/21 09:00  02/01/21 09:42





  Dexamethasone 4 Mg/Ml Vial  SLOW IVP   6 mg





  DAILY AMANDA   Administration


 


Enoxaparin Sodium  80 mg  01/29/21 21:00  02/01/21 09:44





  Enoxaparin Sodium 80 Mg/0.8 Ml Syringe  SC   80 mg





  0900,2100 AMANDA   Administration


 


Famotidine  20 mg  01/29/21 09:00  02/01/21 09:43





  Famotidine 20 Mg Tab  PO   20 mg





  BID AMANDA   Administration


 


Guaifenesin/Dextromethorphan  15 ml  01/29/21 03:46  01/29/21 11:37





  Guaifenesin Dm 100-10/5 Ml Udcup  PO   15 ml





  Q4H PRN   Administration





  Cough  


 


Remdesivir 100 mg/ Sodium  250 mls @ 250 mls/hr  01/30/21 10:00  02/01/21 09:33





  Chloride  IV  02/02/21 10:59  250 mls





  1000 AMANDA   Administration


 


Polyethylene Glycol  17 gm  01/29/21 09:00  02/01/21 10:17





  Polyethylene Glycol 3350 17 Gm Packet  PO   Not Given





  DAILY AMANDA  


 


Potassium Chloride  8 meq  01/30/21 08:00  02/01/21 09:43





  Potassium Chloride 8 Meq Tab  PO   8 meq





  QAM-WM AMANDA   Administration


 


Tamsulosin HCl  0.4 mg  01/29/21 09:00  02/01/21 09:44





  Tamsulosin Hcl 0.4 Mg Cap  PO   0.4 mg





  BID AMANDA   Administration


 


Zinc Sulfate  220 mg  01/29/21 09:00  02/01/21 09:44





  Zinc Sulfate 220 Mg Cap  PO   220 mg





  DAILY AMANDA   Administration














Hospitalist Exam


Vitals: 


                             Vital Signs (12 hours)











  Temp Pulse Resp BP Pulse Ox


 


 02/01/21 08:00  98.0 F  80  26 H   94 L


 


 02/01/21 07:30      94 L


 


 02/01/21 05:05  98.7 F  71  28 H  100/55 L  100


 


 02/01/21 02:35      97








                                     Weight











Weight                         201 lb














General Appearance: awake alert


ENT: normocephalic atraumatic


Neck: supple


Heart: irregular


Respiratory: normal chest expansion, no tachypnea


Extremities: no cyanosis, no clubbing


Neurological: cranial nerve grossly intact





Hosp A/P


(1) Acute respiratory failure


Code(s): J96.00 - ACUTE RESPIRATORY FAILURE, UNSP W HYPOXIA OR HYPERCAPNIA   

Status: Resolved   





(2) COVID-19 virus infection


Code(s): U07.1 - COVID-19   Status: Acute   





(3) Atrial fibrillation with rapid ventricular response


Code(s): I48.91 - UNSPECIFIED ATRIAL FIBRILLATION   Status: Acute   





(4) CKD (chronic kidney disease), stage III


Code(s): N18.3 - CHRONIC KIDNEY DISEASE, STAGE 3 (MODERATE) * DO NOT USE *   

Status: Acute   





(5) HTN (hypertension)


Code(s): I10 - ESSENTIAL (PRIMARY) HYPERTENSION   Status: Chronic   


Qualifiers: 


   Hypertension type: essential hypertension   Qualified Code(s): I10 - 

Essential (primary) hypertension   





- Plan





Code green was called to the room today because the patient became acutely hy

poxic.


Upon my arrival, the patient was sitting in the bed and saturating in the high 

80s on high flow nasal cannula and nonrebreather mask.


He did not appears to be struggling to breathe.


He is confused.


He had a bout of diarrhea today and his Andrew catheter appeared to be 

contaminated.


Bilateral lower extremity edema was noted.


Chest x-ray revealed some evidence of pulmonary edema.


The patient received an extra dose of Lasix today.  Service were changed to 

Bumex IV twice daily.


Urine analysis showed growth of Pseudomonas aeruginosa.  The organism is a 

multidrug-resistant strain.


Meropenem will be initiated.


The patient is having diarrhea.


Check stool for C. difficile.





Continue dexamethasone, enoxaparin, remdesivir, and antibiotics.


Initiate BiPAP to help improve his oxygen saturations and help with his 

pulmonary edema.


The patient was started on dobutamine by cardiology.

## 2021-02-01 NOTE — RAD
EXAM: Single view of the chest



HISTORY:   Dyspnea



COMPARISON: 1/30/2021



FINDINGS: Single view of the chest shows an enlarged but stable cardiomediastinal silhouette.  Athero
sclerotic calcifications are seen in the aorta.  There are diffuse multifocal mixed infiltrates,

greatest in the right lower lobe. No acute osseous abnormality.



IMPRESSION: Stable exam



Reported By: Tez Jalloh 

Electronically Signed:  2/1/2021 1:52 PM

## 2021-02-02 LAB
ALBUMIN SERPL BCG-MCNC: 2.4 G/DL (ref 3.4–4.8)
ALP SERPL-CCNC: 129 U/L (ref 40–110)
ALT SERPL W P-5'-P-CCNC: 11 U/L (ref 8–55)
ANION GAP SERPL CALC-SCNC: 14 MMOL/L (ref 10–20)
AST SERPL-CCNC: 16 U/L (ref 5–34)
BASOPHILS # BLD AUTO: 0 THOU/UL (ref 0–0.2)
BASOPHILS NFR BLD AUTO: 0 % (ref 0–1)
BILIRUB DIRECT SERPL-MCNC: 0.1 MG/DL (ref 0.1–0.3)
BILIRUB SERPL-MCNC: 0.3 MG/DL (ref 0.2–1.2)
BUN SERPL-MCNC: 28 MG/DL (ref 8.4–25.7)
CALCIUM SERPL-MCNC: 7 MG/DL (ref 7.8–10.44)
CHLORIDE SERPL-SCNC: 107 MMOL/L (ref 98–107)
CO2 SERPL-SCNC: 22 MMOL/L (ref 23–31)
CREAT CL PREDICTED SERPL C-G-VRATE: 93 ML/MIN (ref 70–130)
EOSINOPHIL # BLD AUTO: 0 THOU/UL (ref 0–0.7)
EOSINOPHIL NFR BLD AUTO: 0.4 % (ref 0–10)
GLUCOSE SERPL-MCNC: 102 MG/DL (ref 83–110)
HGB BLD-MCNC: 11.7 G/DL (ref 14–18)
LYMPHOCYTES # BLD: 2 THOU/UL (ref 1.2–3.4)
LYMPHOCYTES NFR BLD AUTO: 15.8 % (ref 21–51)
MCH RBC QN AUTO: 30.2 PG (ref 27–31)
MCV RBC AUTO: 96.8 FL (ref 78–98)
MONOCYTES # BLD AUTO: 0.7 THOU/UL (ref 0.11–0.59)
MONOCYTES NFR BLD AUTO: 5.2 % (ref 0–10)
NEUTROPHILS # BLD AUTO: 9.9 THOU/UL (ref 1.4–6.5)
NEUTROPHILS NFR BLD AUTO: 78.6 % (ref 42–75)
PLATELET # BLD AUTO: 278 THOU/UL (ref 130–400)
POTASSIUM SERPL-SCNC: 3.8 MMOL/L (ref 3.5–5.1)
RBC # BLD AUTO: 3.88 MILL/UL (ref 4.7–6.1)
SODIUM SERPL-SCNC: 139 MMOL/L (ref 136–145)
WBC # BLD AUTO: 12.6 THOU/UL (ref 4.8–10.8)

## 2021-02-02 RX ADMIN — ASPIRIN 81 MG CHEWABLE TABLET SCH MG: 81 TABLET CHEWABLE at 21:15

## 2021-02-02 RX ADMIN — MEROPENEM AND SODIUM CHLORIDE SCH MLS: 1 INJECTION, SOLUTION INTRAVENOUS at 05:34

## 2021-02-02 RX ADMIN — Medication SCH ML: at 21:15

## 2021-02-02 RX ADMIN — MEROPENEM AND SODIUM CHLORIDE SCH MLS: 1 INJECTION, SOLUTION INTRAVENOUS at 21:15

## 2021-02-02 RX ADMIN — Medication SCH MG: at 08:54

## 2021-02-02 RX ADMIN — MEROPENEM AND SODIUM CHLORIDE SCH MLS: 1 INJECTION, SOLUTION INTRAVENOUS at 13:28

## 2021-02-02 RX ADMIN — REMDESIVIR SCH MLS: 100 INJECTION, POWDER, LYOPHILIZED, FOR SOLUTION INTRAVENOUS at 10:23

## 2021-02-02 RX ADMIN — Medication SCH ML: at 08:55

## 2021-02-02 NOTE — PDOC.HOSPP
- Subjective


Encounter Date: 02/02/21


Subjective: 


The patient is alert and oriented today.


He is saturating well on high flow nasal cannula.








- Objective


Vital Signs & Weight: 


                             Vital Signs (12 hours)











  Temp Pulse Resp BP Pulse Ox


 


 02/02/21 11:05  98.1 F  69  12  119/58 L  100


 


 02/02/21 08:00  97.5 F L  95  25 H  108/60  100


 


 02/02/21 06:00   87   110/67 


 


 02/02/21 04:56  98.3 F  90  16  92/52 L  100








                                     Weight











Admit Weight                   201 lb


 


Weight                         195 lb 1.745 oz














I&O: 


                                        











 02/01/21 02/02/21 02/03/21





 06:59 06:59 06:59


 


Intake Total  350 


 


Output Total  1900 


 


Balance  -1550 











Result Diagrams: 


                                 02/02/21 04:40





                                 02/02/21 04:40


Additional Labs: 


                                   Accuchecks











  02/01/21





  13:12


 


POC Glucose  98














Hospitalist ROS





- Medication


Medications: 


Active Medications











Generic Name Dose Route Start Last Admin





  Trade Name Fernandoq  PRN Reason Stop Dose Admin


 


Acetaminophen  650 mg  01/29/21 03:46  02/02/21 08:46





  Acetaminophen 325 Mg Tab  PO   650 mg





  Q4H PRN   Administration





  Headache/Fever/Mild Pain (1-3)  


 


Ascorbic Acid  1,000 mg  01/29/21 09:00  02/02/21 08:54





  Ascorbic Acid 500 Mg Chewable Tablet  PO   1,000 mg





  DAILY AMANDA   Administration


 


Aspirin  81 mg  01/29/21 21:00  02/01/21 21:12





  Aspirin Chewable 81 Mg Tab  PO   81 mg





  HS AMANDA   Administration


 


Bumetanide  1 mg  02/01/21 14:00  02/02/21 05:34





  Bumetanide 1 Mg/4 Ml Vial  IVP   1 mg





  0600,1400 AMANDA   Administration


 


Carvedilol  3.125 mg  01/29/21 09:00  02/02/21 08:53





  Carvedilol 3.125 Mg Tab  PO   3.125 mg





  BID AMANDA   Administration


 


Colchicine  0.6 mg  01/29/21 09:00  02/02/21 08:47





  Colchicine 0.6 Mg Tab  PO   0.6 mg





  BID AMANDA   Administration


 


Dexamethasone  6 mg  01/29/21 09:00  02/02/21 08:54





  Dexamethasone 4 Mg/Ml Vial  SLOW IVP   6 mg





  DAILY AMANDA   Administration


 


Enoxaparin Sodium  80 mg  01/29/21 21:00  02/02/21 08:54





  Enoxaparin Sodium 80 Mg/0.8 Ml Syringe  SC   80 mg





  0900,2100 AMANDA   Administration


 


Famotidine  20 mg  01/29/21 09:00  02/02/21 08:46





  Famotidine 20 Mg Tab  PO   20 mg





  BID AMANDA   Administration


 


Guaifenesin/Dextromethorphan  15 ml  01/29/21 03:46  01/29/21 11:37





  Guaifenesin Dm 100-10/5 Ml Udcup  PO   15 ml





  Q4H PRN   Administration





  Cough  


 


Dopamine HCl/Dextrose  250 mls @ 17.095 mls/hr  02/01/21 13:30  02/02/21 02:27





  Dopamine 400 Mg/D5w 250 Ml  IVPB   250 mls





  INF AMANDA   Administration





  Protocol  





  5 MCG/KG/MIN  


 


Meropenem 1 gm/ Device  50 mls @ 100 mls/hr  02/01/21 14:00  02/02/21 13:28





  IVPB   50 mls





  Q8HR AMANDA   Administration


 


Polyethylene Glycol  17 gm  01/29/21 09:00  02/02/21 08:55





  Polyethylene Glycol 3350 17 Gm Packet  PO   Not Given





  DAILY AMANDA  


 


Potassium Chloride  8 meq  01/30/21 08:00  02/02/21 08:57





  Potassium Chloride 8 Meq Tab  PO   8 meq





  QAM-WM AMANDA   Administration


 


Sodium Chloride  10 ml  02/01/21 21:00  02/02/21 08:55





  Flush - Normal Saline 10 Ml Syringe  IVF   10 ml





  Q12HR AMANDA   Administration


 


Tamsulosin HCl  0.4 mg  01/29/21 09:00  02/02/21 08:47





  Tamsulosin Hcl 0.4 Mg Cap  PO   0.4 mg





  BID AMANDA   Administration


 


Zinc Sulfate  220 mg  01/29/21 09:00  02/02/21 08:53





  Zinc Sulfate 220 Mg Cap  PO   220 mg





  DAILY AMANDA   Administration














Hospitalist Exam


Vitals: 


                             Vital Signs (12 hours)











  Temp Pulse Resp BP Pulse Ox


 


 02/02/21 11:05  98.1 F  69  12  119/58 L  100


 


 02/02/21 08:00  97.5 F L  95  25 H  108/60  100


 


 02/02/21 06:00   87   110/67 


 


 02/02/21 04:56  98.3 F  90  16  92/52 L  100








                                     Weight











Admit Weight                   201 lb


 


Weight                         195 lb 1.745 oz














General Appearance: awake alert


ENT: normocephalic atraumatic


Neck: supple


Heart: irregular


Respiratory: normal chest expansion, no tachypnea


Gastrointestinal: soft


Extremities: no cyanosis, no clubbing


Neurological: cranial nerve grossly intact, no focal deficits





Hosp A/P


(1) Acute respiratory failure


Code(s): J96.00 - ACUTE RESPIRATORY FAILURE, UNSP W HYPOXIA OR HYPERCAPNIA   

Status: Resolved   





(2) COVID-19 virus infection


Code(s): U07.1 - COVID-19   Status: Acute   





(3) Atrial fibrillation with rapid ventricular response


Code(s): I48.91 - UNSPECIFIED ATRIAL FIBRILLATION   Status: Acute   





(4) CKD (chronic kidney disease), stage III


Code(s): N18.3 - CHRONIC KIDNEY DISEASE, STAGE 3 (MODERATE) * DO NOT USE *   

Status: Acute   





(5) HTN (hypertension)


Code(s): I10 - ESSENTIAL (PRIMARY) HYPERTENSION   Status: Chronic   


Qualifiers: 


   Hypertension type: essential hypertension   Qualified Code(s): I10 - 

Essential (primary) hypertension   





(6) Urinary tract infection


Status: Acute   





- Plan





The patient is more alert and oriented today.


Hypoxia improved since yesterday and he is now on high flow nasal cannula.


He was weaned off BiPAP last night.


Continue dexamethasone and enoxaparin.


The patient completed a course of remdesivir.


Negative fluid balance with past 24 hours with diuresis.


I would continue Bumex.


Patient is on dopamine per cardiology.


He remains in sinus rhythm.  Continue Coreg and Lovenox.


Continue meropenem for Pseudomonas UTI.


The patient's diarrhea is likely due to the daily laxative he was receiving.  I 

will discontinue MiraLAX.  No indication for C. difficile sling at this time.

## 2021-02-02 NOTE — PDOC.CPN
- Subjective


Date: 02/02/21


Time: 13:20


Interval history: 





Patient stable over night, patient states he didn't sleep very well but states 

he feels much better today than yesterday. He denies any chest pain, he has 

remained in sinus rhythm overnight. 





- Review of Systems


General: denies: fever/chills, weight/appetite/sleep changes, night sweats, 

fatigue


Respiratory: reports: shortness of breath


Cardiovascular: reports: edema.  denies: chest pain, palpitation, paroxysmal 

nocturnal dyspnea, orthopnea


Gastrointestinal: reports: diarrhea


Neurological: reports: numbness





- Objective


Allergies/Adverse Reactions: 


                                    Allergies











Allergy/AdvReac Type Severity Reaction Status Date / Time


 


amitriptyline Allergy  Confusion Verified 11/05/20 06:25


 


hydrochlorothiazide Allergy  Hives Verified 11/04/20 22:58


 


valsartan Allergy  Hives Verified 11/04/20 22:58


 


ACE Inhibitors AdvReac  Hives Verified 11/04/20 22:58











Visit Medications: 


                               Current Medications





Acetaminophen (Acetaminophen 325 Mg Tab)  650 mg PO Q4H PRN


   PRN Reason: Headache/Fever/Mild Pain (1-3)


   Last Admin: 02/02/21 08:46 Dose:  650 mg


   Documented by: 


Hydrocodone Bitart/Acetaminophen (Hydrocodone/Acetaminophen 5/325 Mg Tablet)  1 

tab PO Q4H PRN


   PRN Reason: Moderate Pain (4-6)


Albuterol/Ipratropium (Ipratropium/Albuterol Sulfate 3 Ml Neb)  3 ml NEB [id number]

PRN


   PRN Reason: SOB &/or Wheezing


Ascorbic Acid (Ascorbic Acid 500 Mg Chewable Tablet)  1,000 mg PO DAILY [facility]


   Last Admin: 02/02/21 08:54 Dose:  1,000 mg


   Documented by: 


Aspirin (Aspirin Chewable 81 Mg Tab)  81 mg PO HS [facility]


   Last Admin: 02/01/21 21:12 Dose:  81 mg


   Documented by: 


Bisacodyl (Bisacodyl 5 Mg Tab)  5 mg PO DAILY PRN


   PRN Reason: Constipation


Bumetanide (Bumetanide 1 Mg/4 Ml Vial)  1 mg IVP 0600,1400 [facility]


   Last Admin: 02/02/21 05:34 Dose:  1 mg


   Documented by: 


Carvedilol (Carvedilol 3.125 Mg Tab)  3.125 mg PO BID [facility]


   Last Admin: 02/02/21 08:53 Dose:  3.125 mg


   Documented by: 


Clonidine (Clonidine 0.1 Mg Tab)  0.1 mg PO BID PRN


   PRN Reason: SBP > 160 use second


Colchicine (Colchicine 0.6 Mg Tab)  0.6 mg PO BID [facility]


   Last Admin: 02/02/21 08:47 Dose:  0.6 mg


   Documented by: 


Dexamethasone (Dexamethasone 4 Mg/Ml Vial)  6 mg SLOW IVP DAILY [facility]


   Last Admin: 02/02/21 08:54 Dose:  6 mg


   Documented by: 


Enoxaparin Sodium (Enoxaparin Sodium 80 Mg/0.8 Ml Syringe)  80 mg SC 0900,2100 

[facility]


   Last Admin: 02/02/21 08:54 Dose:  80 mg


   Documented by: 


Famotidine (Famotidine 20 Mg Tab)  20 mg PO BID [facility]


   Last Admin: 02/02/21 08:46 Dose:  20 mg


   Documented by: 


Guaifenesin/Dextromethorphan (Guaifenesin Dm 100-10/5 Ml Udcup)  15 ml PO Q4H 

PRN


   PRN Reason: Cough


   Last Admin: 01/29/21 11:37 Dose:  15 ml


   Documented by: 


Hydralazine HCl (Hydralazine 20 Mg/Ml Vial)  10 mg SLOW IVP Q6H PRN


   PRN Reason: SBP GREATER THAN 160


Promethazine HCl 12.5 mg/ (Sodium Chloride)  50.5 mls @ 202 mls/hr IVPB Q6H PRN


   PRN Reason: Nausea/vomiting use second


Dopamine HCl/Dextrose (Dopamine 400 Mg/D5w 250 Ml)  250 mls @ 17.095 mls/hr IVPB

INF [facility]; Protocol


   Last Admin: 02/02/21 02:27 Dose:  250 mls


   Documented by: 


Meropenem 1 gm/ Device  50 mls @ 100 mls/hr IVPB Q8HR [facility]


   Last Admin: 02/02/21 13:28 Dose:  50 mls


   Documented by: 


Labetalol HCl (Labetalol Hcl 100 Mg/20 Ml Vial)  20 mg SLOW IVP Q4H PRN


   PRN Reason: SBP > 160 use first


Miscellaneous Medication (Electrolyte Replacement Protocol)  0 each FS ASDIR 

PRN; Protocol


   PRN Reason: ELECTROLYTE REPLACEMENT


Morphine Sulfate (Morphine 2 Mg/Ml Vial)  2 mg SLOW IVP Q4H PRN


   PRN Reason: severe pain 4-10


Ondansetron HCl (Ondansetron Pf 4 Mg/2 Ml Vial)  4 mg IVP Q6H PRN


   PRN Reason: Nausea/Vomiting use 1st


Polyethylene Glycol (Polyethylene Glycol 3350 17 Gm Packet)  17 gm PO DAILY [facility]


   Last Admin: 02/02/21 08:55 Dose:  Not Given


   Documented by: 


Potassium Chloride (Potassium Chloride 8 Meq Tab)  8 meq PO QAM-WM [facility]


   Last Admin: 02/02/21 08:57 Dose:  8 meq


   Documented by: 


Sodium Chloride (Flush - Normal Saline 10 Ml Syringe)  10 ml IVF Q12HR [facility]


   Last Admin: 02/02/21 08:55 Dose:  10 ml


   Documented by: 


Sodium Chloride (Flush - Normal Saline 10 Ml Syringe)  10 ml IVF PRN PRN


   PRN Reason: Saline Flush


Tamsulosin HCl (Tamsulosin Hcl 0.4 Mg Cap)  0.4 mg PO BID [facility]


   Last Admin: 02/02/21 08:47 Dose:  0.4 mg


   Documented by: 


Zinc Sulfate (Zinc Sulfate 220 Mg Cap)  220 mg PO DAILY [facility]


   Last Admin: 02/02/21 08:53 Dose:  220 mg


   Documented by: 








Vital Signs & Weight: 


                                   Vital Signs











  Temp Pulse Resp BP Pulse Ox


 


 02/02/21 11:05  98.1 F  69  12  119/58 L  100


 


 02/02/21 08:00  97.5 F L  95  25 H  108/60  100


 


 02/02/21 06:00   87   110/67 


 


 02/02/21 04:56  98.3 F  90  16  92/52 L  100


 


 02/02/21 02:00   88  19  101/64  100








                                        











Admit Weight                   201 lb


 


Weight                         195 lb 1.745 oz

















- Quality Measures


Condition: Atrial Fibrillation/Flutter (hx or current), Heart Failure


CV meds: Beta Blocker: Yes, ACE/ARB: No (Hypotension ), ASA: Yes, Anticoagulant:

Yes (Lovenox 80 mg SQ BID )





- Medication Contraindications


No ACE/ARB reason: Medical contraindication





- Physical Exam


General: alert & oriented x3


HEENT: mucus membranes moist


Neck: no bruit


Cardiac: regular rate and rhythm, no murmur


Lungs: decreased breath sounds, oxygen ([place] O2)


Neuro: numbness, other (c/o numbness to BLE)


Abdomen: active bowel sounds, soft, non-tender


Extremities: other: (3+ edema to BLE)


Skin: clear


Musculoskeletal: no pain





- Labs


Result Diagrams: 


                                 02/02/21 04:40





                                 02/02/21 04:40


                                  Troponin/CKMB











Troponin I  0.022 ng/mL (< 0.028)   01/29/21  04:24    














- EKG Interpretation


EKG Method: Telemetry


EKG: sinus rhythm (Sinus rhythm with PAC's, HR 's, no overnight EKG 

changes)





- Assessment/Plan


Assessment/Plan: 





1. New onset atrial fibrillation: He remains in sinus rhythm at this time, he is

on full-dose Lovenox and Coreg 3.125 PO BID 


2. COVID-19 Pneumonia: on hi flow O2, treated by primary care services 


3. Cardiomyopathy: EF 40-45% (August 2020) he remains on the Dopamine gtt, his 

BP have sustained in the 110's Systolic 


4. Metastatic Prostate Cancer


5. UTI with gram negative rods: treated by primary care services: he is on IV 

antibiotics for this 


6. Bilateral lower extremity edema: His BLE edema is slightly decreasing, he was

able to diureis 1550 mL of fluid.





Pt. seen and eval. by me. I agree with the A/P by the NP. I observed the pt. 

sleeping today and it appears that he has sleep apnea.  chest: diffuse rhonchi 

but improved over yesterday. RRR, 2-3+ edema but also improved from yesterday. 

[email or web address]

## 2021-02-03 LAB
ANION GAP SERPL CALC-SCNC: 12 MMOL/L (ref 10–20)
BUN SERPL-MCNC: 25 MG/DL (ref 8.4–25.7)
CALCIUM SERPL-MCNC: 7.2 MG/DL (ref 7.8–10.44)
CHLORIDE SERPL-SCNC: 107 MMOL/L (ref 98–107)
CO2 SERPL-SCNC: 22 MMOL/L (ref 23–31)
CREAT CL PREDICTED SERPL C-G-VRATE: 106 ML/MIN (ref 70–130)
GLUCOSE SERPL-MCNC: 100 MG/DL (ref 83–110)
HGB BLD-MCNC: 11.2 G/DL (ref 14–18)
MAGNESIUM SERPL-MCNC: 1.9 MG/DL (ref 1.6–2.6)
MCH RBC QN AUTO: 29.7 PG (ref 27–31)
MCV RBC AUTO: 95.9 FL (ref 78–98)
MDIFF COMPLETE?: YES
PLATELET # BLD AUTO: 315 THOU/UL (ref 130–400)
POTASSIUM SERPL-SCNC: 3.5 MMOL/L (ref 3.5–5.1)
RBC # BLD AUTO: 3.76 MILL/UL (ref 4.7–6.1)
SODIUM SERPL-SCNC: 137 MMOL/L (ref 136–145)
WBC # BLD AUTO: 11.6 THOU/UL (ref 4.8–10.8)

## 2021-02-03 RX ADMIN — ASPIRIN 81 MG CHEWABLE TABLET SCH MG: 81 TABLET CHEWABLE at 21:03

## 2021-02-03 RX ADMIN — Medication SCH MG: at 08:14

## 2021-02-03 RX ADMIN — MEROPENEM AND SODIUM CHLORIDE SCH MLS: 1 INJECTION, SOLUTION INTRAVENOUS at 15:50

## 2021-02-03 RX ADMIN — Medication SCH ML: at 21:04

## 2021-02-03 RX ADMIN — Medication SCH ML: at 08:15

## 2021-02-03 RX ADMIN — MEROPENEM AND SODIUM CHLORIDE SCH MLS: 1 INJECTION, SOLUTION INTRAVENOUS at 06:35

## 2021-02-03 RX ADMIN — MEROPENEM AND SODIUM CHLORIDE SCH MLS: 1 INJECTION, SOLUTION INTRAVENOUS at 21:05

## 2021-02-03 NOTE — PDOC.HOSPP
- Subjective


Encounter Date: 02/03/21


Subjective: 


has no complaints, asking when he can leave.





- Objective


Vital Signs & Weight: 


                             Vital Signs (12 hours)











  Temp Pulse Resp BP Pulse Ox


 


 02/03/21 18:00  98.0 F  93  20  94/62  94 L


 


 02/03/21 16:05  97.9 F  85  24 H  104/65  96


 


 02/03/21 14:45      96


 


 02/03/21 11:15  97.7 F  79  24 H  102/59 L  97


 


 02/03/21 08:30  97.8 F  84  24 H  96/53 L  92 L








                                     Weight











Admit Weight                   201 lb


 


Weight                         192 lb 7.417 oz














I&O: 


                                        











 02/02/21 02/03/21 02/04/21





 06:59 06:59 06:59


 


Intake Total 350 60 746


 


Output Total 4960 4590 1322


 


Balance -1550 -965 -579











Result Diagrams: 


                                 02/03/21 05:02





                                 02/03/21 05:02





Hospitalist ROS





- Medication


Medications: 


Active Medications











Generic Name Dose Route Start Last Admin





  Trade Name Freq  PRN Reason Stop Dose Admin


 


Acetaminophen  650 mg  01/29/21 03:46  02/02/21 08:46





  Acetaminophen 325 Mg Tab  PO   650 mg





  Q4H PRN   Administration





  Headache/Fever/Mild Pain (1-3)  


 


Ascorbic Acid  1,000 mg  01/29/21 09:00  02/03/21 08:14





  Ascorbic Acid 500 Mg Chewable Tablet  PO   1,000 mg





  DAILY AMANDA   Administration


 


Aspirin  81 mg  01/29/21 21:00  02/02/21 21:15





  Aspirin Chewable 81 Mg Tab  PO   81 mg





  HS AMANDA   Administration


 


Bumetanide  1 mg  02/01/21 14:00  02/03/21 15:50





  Bumetanide 1 Mg/4 Ml Vial  IVP   1 mg





  0600,1400 AMANDA   Administration


 


Carvedilol  3.125 mg  01/29/21 09:00  02/03/21 09:16





  Carvedilol 3.125 Mg Tab  PO   Not Given





  BID AMANDA  


 


Colchicine  0.6 mg  01/29/21 09:00  02/03/21 08:14





  Colchicine 0.6 Mg Tab  PO   0.6 mg





  BID AMANDA   Administration


 


Dexamethasone  6 mg  01/29/21 09:00  02/03/21 08:15





  Dexamethasone 4 Mg/Ml Vial  SLOW IVP   6 mg





  DAILY AMANDA   Administration


 


Enoxaparin Sodium  80 mg  01/29/21 21:00  02/03/21 08:14





  Enoxaparin Sodium 80 Mg/0.8 Ml Syringe  SC   80 mg





  0900,2100 AMANDA   Administration


 


Famotidine  20 mg  01/29/21 09:00  02/03/21 08:14





  Famotidine 20 Mg Tab  PO   20 mg





  BID AMANDA   Administration


 


Guaifenesin/Dextromethorphan  15 ml  01/29/21 03:46  01/29/21 11:37





  Guaifenesin Dm 100-10/5 Ml Udcup  PO   15 ml





  Q4H PRN   Administration





  Cough  


 


Dopamine HCl/Dextrose  250 mls @ 17.095 mls/hr  02/01/21 13:30  02/03/21 09:49





  Dopamine 400 Mg/D5w 250 Ml  IVPB   250 mls





  INF AMANDA   Administration





  Protocol  





  5 MCG/KG/MIN  


 


Meropenem 1 gm/ Device  50 mls @ 100 mls/hr  02/01/21 14:00  02/03/21 15:50





  IVPB   50 mls





  Q8HR AMANDA   Administration


 


Sodium Chloride  10 ml  02/01/21 21:00  02/03/21 08:15





  Flush - Normal Saline 10 Ml Syringe  IVF   10 ml





  Q12HR AMANDA   Administration


 


Tamsulosin HCl  0.4 mg  01/29/21 09:00  02/03/21 08:14





  Tamsulosin Hcl 0.4 Mg Cap  PO   0.4 mg





  BID AMANDA   Administration


 


Zinc Sulfate  220 mg  01/29/21 09:00  02/03/21 08:14





  Zinc Sulfate 220 Mg Cap  PO   220 mg





  DAILY AMANDA   Administration














Hospitalist Exam


Vitals: 


                             Vital Signs (12 hours)











  Temp Pulse Resp BP Pulse Ox


 


 02/03/21 18:00  98.0 F  93  20  94/62  94 L


 


 02/03/21 16:05  97.9 F  85  24 H  104/65  96


 


 02/03/21 14:45      96


 


 02/03/21 11:15  97.7 F  79  24 H  102/59 L  97


 


 02/03/21 08:30  97.8 F  84  24 H  96/53 L  92 L








                                     Weight











Admit Weight                   201 lb


 


Weight                         192 lb 7.417 oz














General Appearance: NAD, awake alert


Eye: PERRL, anicteric sclera


ENT: normocephalic atraumatic, no oropharyngeal lesions


Neck: supple, symmetric, no JVD, no thyromegaly


Heart: RRR, no murmur, no gallops, no rubs


Respiratory: CTAB, no wheezes, no rales, no ronchi, normal chest expansion


Gastrointestinal: soft, non-tender, non-distended, normal bowel sounds, no 

palpable masses


Extremities: 1+ LE edema (cold to touch bilaterally, due to edema tough to get a

pulse.)





Hosp A/P


(1) Atrial fibrillation with rapid ventricular response


Code(s): I48.91 - UNSPECIFIED ATRIAL FIBRILLATION   Status: Acute   





(2) COVID-19 virus infection


Code(s): U07.1 - COVID-19   Status: Acute   





(3) Urinary tract infection


Status: Acute   





(4) Leg edema


Code(s): R60.0 - LOCALIZED EDEMA   Status: Acute   





(5) CHF (congestive heart failure)


Code(s): I50.9 - HEART FAILURE, UNSPECIFIED   Status: Chronic   





- Plan





plan for today 2/3








Hypoxia  on high flow nasal cannula, try to wean.





Continue dexamethasone and enoxaparin.


The patient completed a course of remdesivir.


Negative fluid balance with past 24 hours with diuresis.


I would continue Bumex.


Patient is on dopamine per cardiology.


He remains in sinus rhythm.  Continue Coreg and Lovenox.BP on the low side, hold

PM coreg dose.


Continue meropenem for Pseudomonas UTI.


legs are cold, check pulses with doppler.

## 2021-02-03 NOTE — PDOC.CPN
- Subjective


Date: 02/03/21


Time: 12:03


Interval history: 





Patient denies any overnight events, he denies and chest pain or shortness of 

breath today. He states he is feeling okay. 





- Review of Systems


General: denies: fever/chills, weight/appetite/sleep changes, night sweats, 

fatigue


Cardiovascular: reports: edema


Gastrointestinal: denies: nausea, vomiting, diarrhea, constipation, abd pain, GI

bleeding


Musculoskeletal: denies: pain, tenderness, stiffness, swelling, arthritis/ar

thralgias


Neurological: denies: numbness, syncope, seizure, weakness





- Objective


Allergies/Adverse Reactions: 


                                    Allergies











Allergy/AdvReac Type Severity Reaction Status Date / Time


 


amitriptyline Allergy  Confusion Verified 11/05/20 06:25


 


hydrochlorothiazide Allergy  Hives Verified 11/04/20 22:58


 


valsartan Allergy  Hives Verified 11/04/20 22:58


 


ACE Inhibitors AdvReac  Hives Verified 11/04/20 22:58











Visit Medications: 


                               Current Medications





Acetaminophen (Acetaminophen 325 Mg Tab)  650 mg PO Q4H PRN


   PRN Reason: Headache/Fever/Mild Pain (1-3)


   Last Admin: 02/02/21 08:46 Dose:  650 mg


   Documented by: 


Hydrocodone Bitart/Acetaminophen (Hydrocodone/Acetaminophen 5/325 Mg Tablet)  1 

tab PO Q4H PRN


   PRN Reason: Moderate Pain (4-6)


Albuterol/Ipratropium (Ipratropium/Albuterol Sulfate 3 Ml Neb)  3 ml NEB [id number]

PRN


   PRN Reason: SOB &/or Wheezing


Ascorbic Acid (Ascorbic Acid 500 Mg Chewable Tablet)  1,000 mg PO DAILY [facility]


   Last Admin: 02/03/21 08:14 Dose:  1,000 mg


   Documented by: 


Aspirin (Aspirin Chewable 81 Mg Tab)  81 mg PO HS [facility]


   Last Admin: 02/02/21 21:15 Dose:  81 mg


   Documented by: 


Bisacodyl (Bisacodyl 5 Mg Tab)  5 mg PO DAILY PRN


   PRN Reason: Constipation


Bumetanide (Bumetanide 1 Mg/4 Ml Vial)  1 mg IVP 0600,1400 [facility]


   Last Admin: 02/03/21 06:32 Dose:  1 mg


   Documented by: 


Carvedilol (Carvedilol 3.125 Mg Tab)  3.125 mg PO BID [facility]


   Last Admin: 02/03/21 09:16 Dose:  Not Given


   Documented by: 


Clonidine (Clonidine 0.1 Mg Tab)  0.1 mg PO BID PRN


   PRN Reason: SBP > 160 use second


Colchicine (Colchicine 0.6 Mg Tab)  0.6 mg PO BID [facility]


   Last Admin: 02/03/21 08:14 Dose:  0.6 mg


   Documented by: 


Dexamethasone (Dexamethasone 4 Mg/Ml Vial)  6 mg SLOW IVP DAILY [facility]


   Last Admin: 02/03/21 08:15 Dose:  6 mg


   Documented by: 


Enoxaparin Sodium (Enoxaparin Sodium 80 Mg/0.8 Ml Syringe)  80 mg SC 0900,2100 

[facility]


   Last Admin: 02/03/21 08:14 Dose:  80 mg


   Documented by: 


Famotidine (Famotidine 20 Mg Tab)  20 mg PO BID [facility]


   Last Admin: 02/03/21 08:14 Dose:  20 mg


   Documented by: 


Guaifenesin/Dextromethorphan (Guaifenesin Dm 100-10/5 Ml Udcup)  15 ml PO Q4H 

PRN


   PRN Reason: Cough


   Last Admin: 01/29/21 11:37 Dose:  15 ml


   Documented by: 


Hydralazine HCl (Hydralazine 20 Mg/Ml Vial)  10 mg SLOW IVP Q6H PRN


   PRN Reason: SBP GREATER THAN 160


Promethazine HCl 12.5 mg/ (Sodium Chloride)  50.5 mls @ 202 mls/hr IVPB Q6H PRN


   PRN Reason: Nausea/vomiting use second


Dopamine HCl/Dextrose (Dopamine 400 Mg/D5w 250 Ml)  250 mls @ 17.095 mls/hr IVPB

INF [facility]; Protocol


   Last Admin: 02/03/21 09:49 Dose:  250 mls


   Documented by: 


Meropenem 1 gm/ Device  50 mls @ 100 mls/hr IVPB Q8HR [facility]


   Last Admin: 02/03/21 06:35 Dose:  50 mls


   Documented by: 


Labetalol HCl (Labetalol Hcl 100 Mg/20 Ml Vial)  20 mg SLOW IVP Q4H PRN


   PRN Reason: SBP > 160 use first


Miscellaneous Medication (Electrolyte Replacement Protocol)  0 each FS ASDIR 

PRN; Protocol


   PRN Reason: ELECTROLYTE REPLACEMENT


Morphine Sulfate (Morphine 2 Mg/Ml Vial)  2 mg SLOW IVP Q4H PRN


   PRN Reason: severe pain 4-10


Ondansetron HCl (Ondansetron Pf 4 Mg/2 Ml Vial)  4 mg IVP Q6H PRN


   PRN Reason: Nausea/Vomiting use 1st


Potassium Chloride (Potassium Chloride 8 Meq Tab)  8 meq PO QAM-WM [facility]


   Last Admin: 02/03/21 08:13 Dose:  8 meq


   Documented by: 


Sodium Chloride (Flush - Normal Saline 10 Ml Syringe)  10 ml IVF Q12HR [facility]


   Last Admin: 02/03/21 08:15 Dose:  10 ml


   Documented by: 


Sodium Chloride (Flush - Normal Saline 10 Ml Syringe)  10 ml IVF PRN PRN


   PRN Reason: Saline Flush


Tamsulosin HCl (Tamsulosin Hcl 0.4 Mg Cap)  0.4 mg PO BID [facility]


   Last Admin: 02/03/21 08:14 Dose:  0.4 mg


   Documented by: 


Zinc Sulfate (Zinc Sulfate 220 Mg Cap)  220 mg PO DAILY [facility]


   Last Admin: 02/03/21 08:14 Dose:  220 mg


   Documented by: 








Vital Signs & Weight: 


                                   Vital Signs











  Temp Pulse Resp BP Pulse Ox


 


 02/03/21 11:15  97.7 F  79  24 H  102/59 L  97


 


 02/03/21 08:30  97.8 F  84  24 H  96/53 L  92 L


 


 02/03/21 08:00  97.7 F  80  12  98/52 L  94 L


 


 02/03/21 07:10      96


 


 02/03/21 04:00  98.3 F  77  20  95/61  100


 


 02/03/21 00:45      100








                                        











Admit Weight                   201 lb


 


Weight                         192 lb 7.417 oz

















- Quality Measures


Condition: Atrial Fibrillation/Flutter (hx or current), Heart Failure


CV meds: Beta Blocker: Yes, ACE/ARB: No (Hypotension ), ASA: Yes, Anticoagulant:

Yes (Lovenox 80 mg SQ BID )





- Medication Contraindications


No ACE/ARB reason: Medical contraindication





- Physical Exam


General: alert & oriented x3, no apparent distress, other


HEENT: mucus membranes dry


Neck: supple neck, no masses


Cardiac: regular rate and rhythm, no murmur


Lungs: decreased breath sounds, scattered rhonchi, oxygen


Neuro: numbness (numbness to BLE)


Abdomen: active bowel sounds, distended


Extremities: 2+ LE edema


Skin: clear


Musculoskeletal: no pain





- Labs


Result Diagrams: 


                                 02/03/21 05:02





                                 02/03/21 05:02


                                  Troponin/CKMB











Troponin I  0.022 ng/mL (< 0.028)   01/29/21  04:24    














- EKG Interpretation


EKG Method: Telemetry (WAP, MAT HR ranging 70's-100's)





- Assessment/Plan


Assessment/Plan: 





1. New onset atrial fibrillation: He remains in sinus rhythm at this time, he is

on full-dose Lovenox and Coreg 3.125 PO BID his HR has remained from the 70's-

100's


2. COVID-19 Pneumonia: on hi flow O2, treated by primary care services 


3. Cardiomyopathy: EF 40-45% (August 2020) he remains on the Dopamine gtt, his 

BP has in the 's Systolic 


4. Metastatic Prostate Cancer


5. UTI with gram negative rods: treated by primary care services: he is on IV 

antibiotics for this 


6. Bilateral lower extremity edema: His BLE edema is slightly decreasing, he was

able to diureis over 900 mL of fluid. He continues to be edematous, will add one

time dose of Metolzaone 5 mg 








Pt. seen and eval. by me. I agree with the A/P by the NP. His chest is clear 

this afternoon. RRR. 2+ LLE. Improved. However, the lower legs are extremely 

cold but not cyanotic. I can not palpate pedal pulkses but the edema is still 

present. 


He remains is sinus rhythm. Overall cardiac status is stable. When the BP 

remains stable then will decrease dopamine. donell

## 2021-02-04 LAB
ANION GAP SERPL CALC-SCNC: 14 MMOL/L (ref 10–20)
BUN SERPL-MCNC: 24 MG/DL (ref 8.4–25.7)
CALCIUM SERPL-MCNC: 7.4 MG/DL (ref 7.8–10.44)
CHLORIDE SERPL-SCNC: 104 MMOL/L (ref 98–107)
CO2 SERPL-SCNC: 25 MMOL/L (ref 23–31)
CREAT CL PREDICTED SERPL C-G-VRATE: 99 ML/MIN (ref 70–130)
GLUCOSE SERPL-MCNC: 90 MG/DL (ref 83–110)
HGB BLD-MCNC: 11.3 G/DL (ref 14–18)
MCH RBC QN AUTO: 30 PG (ref 27–31)
MCV RBC AUTO: 95.6 FL (ref 78–98)
MDIFF COMPLETE?: YES
PLATELET # BLD AUTO: 350 THOU/UL (ref 130–400)
POTASSIUM SERPL-SCNC: 3.4 MMOL/L (ref 3.5–5.1)
RBC # BLD AUTO: 3.77 MILL/UL (ref 4.7–6.1)
SODIUM SERPL-SCNC: 140 MMOL/L (ref 136–145)
WBC # BLD AUTO: 10.7 THOU/UL (ref 4.8–10.8)

## 2021-02-04 RX ADMIN — Medication SCH ML: at 21:03

## 2021-02-04 RX ADMIN — Medication SCH ML: at 07:24

## 2021-02-04 RX ADMIN — Medication SCH MG: at 07:23

## 2021-02-04 RX ADMIN — MEROPENEM AND SODIUM CHLORIDE SCH MLS: 1 INJECTION, SOLUTION INTRAVENOUS at 21:03

## 2021-02-04 RX ADMIN — MEROPENEM AND SODIUM CHLORIDE SCH MLS: 1 INJECTION, SOLUTION INTRAVENOUS at 06:17

## 2021-02-04 RX ADMIN — ASPIRIN 81 MG CHEWABLE TABLET SCH MG: 81 TABLET CHEWABLE at 21:03

## 2021-02-04 RX ADMIN — MEROPENEM AND SODIUM CHLORIDE SCH MLS: 1 INJECTION, SOLUTION INTRAVENOUS at 14:48

## 2021-02-04 NOTE — PDOC.CPN
- Subjective


Date: 02/04/21


Time: 09:45


Interval history: 





Patient sitting up in bed talking with family on phone. Overnight, patient 

developed blood in his stool. His Lovenox was stopped and GI was consulted. He 

denies any abdominal pain, chest pain, or shortness of breath today. 





- Review of Systems


Respiratory: denies: cough, congestion, shortness of breath, exercise 

intolerance


Cardiovascular: reports: edema


Gastrointestinal: reports: diarrhea


Musculoskeletal: denies: pain, tenderness, stiffness, swelling, 

arthritis/arthralgias


Neurological: denies: numbness, syncope, seizure, weakness





- Objective


Allergies/Adverse Reactions: 


                                    Allergies











Allergy/AdvReac Type Severity Reaction Status Date / Time


 


amitriptyline Allergy  Confusion Verified 11/05/20 06:25


 


hydrochlorothiazide Allergy  Hives Verified 11/04/20 22:58


 


valsartan Allergy  Hives Verified 11/04/20 22:58


 


ACE Inhibitors AdvReac  Hives Verified 11/04/20 22:58











Visit Medications: 


                               Current Medications





Acetaminophen (Acetaminophen 325 Mg Tab)  650 mg PO Q4H PRN


   PRN Reason: Headache/Fever/Mild Pain (1-3)


   Last Admin: 02/02/21 08:46 Dose:  650 mg


   Documented by: 


Hydrocodone Bitart/Acetaminophen (Hydrocodone/Acetaminophen 5/325 Mg Tablet)  1 

tab PO Q4H PRN


   PRN Reason: Moderate Pain (4-6)


Albuterol/Ipratropium (Ipratropium/Albuterol Sulfate 3 Ml Neb)  3 ml NEB [id number]

PRN


   PRN Reason: SOB &/or Wheezing


Ascorbic Acid (Ascorbic Acid 500 Mg Chewable Tablet)  1,000 mg PO DAILY [facility]


   Last Admin: 02/04/21 07:23 Dose:  1,000 mg


   Documented by: 


Aspirin (Aspirin Chewable 81 Mg Tab)  81 mg PO HS [facility]


   Last Admin: 02/03/21 21:03 Dose:  81 mg


   Documented by: 


Bisacodyl (Bisacodyl 5 Mg Tab)  5 mg PO DAILY PRN


   PRN Reason: Constipation


Bumetanide (Bumetanide 1 Mg/4 Ml Vial)  1 mg IVP 0600,1400 [facility]


   Last Admin: 02/04/21 06:17 Dose:  1 mg


   Documented by: 


Carvedilol (Carvedilol 3.125 Mg Tab)  3.125 mg PO BID [facility]


   Last Admin: 02/04/21 07:24 Dose:  3.125 mg


   Documented by: 


Clonidine (Clonidine 0.1 Mg Tab)  0.1 mg PO BID PRN


   PRN Reason: SBP > 160 use second


Colchicine (Colchicine 0.6 Mg Tab)  0.6 mg PO BID [facility]


   Last Admin: 02/04/21 07:23 Dose:  0.6 mg


   Documented by: 


Dexamethasone (Dexamethasone 4 Mg/Ml Vial)  6 mg SLOW IVP DAILY [facility]


   Last Admin: 02/04/21 07:22 Dose:  6 mg


   Documented by: 


Guaifenesin/Dextromethorphan (Guaifenesin Dm 100-10/5 Ml Udcup)  15 ml PO Q4H 

PRN


   PRN Reason: Cough


   Last Admin: 01/29/21 11:37 Dose:  15 ml


   Documented by: 


Hydralazine HCl (Hydralazine 20 Mg/Ml Vial)  10 mg SLOW IVP Q6H PRN


   PRN Reason: SBP GREATER THAN 160


Promethazine HCl 12.5 mg/ (Sodium Chloride)  50.5 mls @ 202 mls/hr IVPB Q6H PRN


   PRN Reason: Nausea/vomiting use second


Dopamine HCl/Dextrose (Dopamine 400 Mg/D5w 250 Ml)  250 mls @ 17.095 mls/hr IVPB

INF [facility]; Protocol


   Last Admin: 02/04/21 01:45 Dose:  250 mls


   Documented by: 


Meropenem 1 gm/ Device  50 mls @ 100 mls/hr IVPB Q8HR [facility]


   Last Admin: 02/04/21 06:17 Dose:  50 mls


   Documented by: 


Labetalol HCl (Labetalol Hcl 100 Mg/20 Ml Vial)  20 mg SLOW IVP Q4H PRN


   PRN Reason: SBP > 160 use first


Miscellaneous Medication (Electrolyte Replacement Protocol)  0 each FS ASDIR 

PRN; Protocol


   PRN Reason: ELECTROLYTE REPLACEMENT


Morphine Sulfate (Morphine 2 Mg/Ml Vial)  2 mg SLOW IVP Q4H PRN


   PRN Reason: severe pain 4-10


Ondansetron HCl (Ondansetron Pf 4 Mg/2 Ml Vial)  4 mg IVP Q6H PRN


   PRN Reason: Nausea/Vomiting use 1st


Pantoprazole Sodium (Pantoprazole 40 Mg Vial)  40 mg IVP Q12HR [facility]


Pantoprazole Sodium (Pantoprazole 40 Mg Vial)  40 mg IVP NOW [facility]


   Stop: 02/04/21 13:00


   Last Admin: 02/04/21 10:24 Dose:  40 mg


   Documented by: 


Potassium Chloride (Potassium Chloride 8 Meq Tab)  16 meq PO QAM-WM [facility]


   Last Admin: 02/04/21 07:22 Dose:  16 meq


   Documented by: 


Sodium Chloride (Flush - Normal Saline 10 Ml Syringe)  10 ml IVF Q12HR [facility]


   Last Admin: 02/04/21 07:24 Dose:  10 ml


   Documented by: 


Sodium Chloride (Flush - Normal Saline 10 Ml Syringe)  10 ml IVF PRN PRN


   PRN Reason: Saline Flush


Tamsulosin HCl (Tamsulosin Hcl 0.4 Mg Cap)  0.4 mg PO BID [facility]


   Last Admin: 02/04/21 07:23 Dose:  0.4 mg


   Documented by: 


Zinc Sulfate (Zinc Sulfate 220 Mg Cap)  220 mg PO DAILY [facility]


   Last Admin: 02/04/21 07:24 Dose:  220 mg


   Documented by: 








Vital Signs & Weight: 


                                   Vital Signs











  Temp Pulse Resp BP Pulse Ox


 


 02/04/21 09:42      98


 


 02/04/21 07:30  98.1 F  93  22 H  109/57 L  97


 


 02/04/21 04:00  97.9 F  91  20  97/54 L  93 L


 


 02/04/21 03:38      100








                                        











Admit Weight                   201 lb


 


Weight                         192 lb 7.417 oz

















- Quality Measures


Condition: Atrial Fibrillation/Flutter (hx or current), Heart Failure


CV meds: Beta Blocker: Yes, ACE/ARB: No (Hypotension ), ASA: Yes, Anticoagulant:

No (D/C at this time)





- Medication Contraindications


No ACE/ARB reason: Medical contraindication


No Anticoagulant reason: Medical contraindication (r/o GI bleed)





- Physical Exam


General: alert & oriented x3, appears well, no apparent distress


HEENT: normocephaly


Neck: supple neck, no bruit


Cardiac: regular rate and rhythm, no murmur


Lungs: clear to auscultation, no wheeze, rales, rhonchi, oxygen (hi-flow nasal 

cannula)


Neuro: numbness


Abdomen: active bowel sounds, soft, non-tender


Extremities: no cyanosis, 1+ LE edema, other: (edema is improved from yesterday,

cold extremities)


Skin: clear


Musculoskeletal: no pain





- Labs


Result Diagrams: 


                                 02/04/21 14:29





                                 02/04/21 04:42


                                  Troponin/CKMB











Troponin I  0.022 ng/mL (< 0.028)   01/29/21  04:24    














- EKG Interpretation


EKG Method: Telemetry (MAT 's)





- Assessment/Plan


Assessment/Plan: 





1. New onset atrial fibrillation: He remains in sinus rhythm at this time,  

Coreg 3.125 PO BID his HR has remained from the 70's-100's. His Lovenox was DC'd

at this time to r/o a GI bleed. Will continue to monitor. 


2. COVID-19 Pneumonia: on hi flow O2, treated by primary care services, his 

lungs sound clear today, he denies any chest pain or trouble breathing today


3. Cardiomyopathy: EF 40-45% (August 2020) he remains on the Dopamine gtt, his 

BP has in the 's Systolic, will consider decreasing dopamine when his BP 

stabilizes. 


4. Metastatic Prostate Cancer


5. UTI with gram negative rods: treated by primary care services: he is on IV 

antibiotics for this 


6. Bilateral lower extremity edema: His BLE edema is improving, he was able to 

diureis over 980 mL of fluid. 


7. Bilateral DVT: Venous duplex yesterday revealed bilateral nonocclusive throm

bus involving the deep venous system of each lower extremity. 


8. Blood in stool: his Lovenox was DC'd at this time, GI has been consulted. 

Will wait for GI evaluation before re-starting anticoagulation. 











I agree withn the A/P by the NP. 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 

but he should be on OAC as soon as cleared by GI. The Hgb. remains stable thus 

the GI bleed is not severe. Overall cardiac function is stable at this time. He 

remains on dopamine for pressure support.donell

## 2021-02-04 NOTE — ULT
Arterial duplex sonogram bilateral lower extremity



HISTORY: Leg pain. Loss of pulses. Vascular disease.



Good color and spectral Doppler flow within the arterial structures of each lower extremity. Triphasi
c waveforms within the common femoral and deep femoral, femoral, popliteal, and posterior tibial

arteries. Anterior tibial arteries show good flow. No abnormally elevated peak systolic velocities. D
orsalis pedis arteries obscured by overlying edema.



While some venous flow is still detected within each lower extremity, internal thrombus with incomple
te compressibility involve each common femoral, femoral, and deep femoral vein.



  



IMPRESSION :

Good arterial flow to each lower extremity without evidence of significant stenosis.



Nonocclusive thrombus involving the deep venous system of each lower extremity.







Findings were called to Dr. Manning at 0734 hours.

Code CR.



Reported By: WALTER Contreras 

Electronically Signed:  2/4/2021 7:38 AM

## 2021-02-04 NOTE — CON
DATE OF CONSULTATION:  02/04/2021



HISTORY OF PRESENT ILLNESS:  The patient is a 80-year-old  male, who was

admitted on 01/29/2021 with COVID infection and shortness of breath.  He also had

atrial fibrillation with rapid ventricular rate.  He is subsequently diagnosed with

DVT and was started on Lovenox.  Since he started on Lovenox, he has had 3 bloody

bowel movements.  He has never had bleeding before.  He reports no prior endoscopic

evaluation and reports he does not want any endoscopic evaluation.  He denies any

nausea or vomiting, any abdominal pain, any recent weight loss.  He denies any NSAID

use. 



PAST MEDICAL HISTORY:  Includes myocardial infarction, hypertension, metastatic

prostate cancer. 



PAST SURGICAL HISTORY:  Includes an appendectomy.



SOCIAL HISTORY:  He does not smoke.  Drinks socially.



FAMILY HISTORY:  Negative for any significant GI family history.



REVIEW OF SYSTEMS:  Negative.



PHYSICAL EXAMINATION:

GENERAL:  Shows an elderly white male, in no acute distress. 

VITAL SIGNS: Temperature 98.5, pulse 96, respiratory rate 20, blood pressure 94/51. 

HEENT:  Significant for nasal BiPAP. 

NECK:  Supple. 

CHEST:  Clear. 

CARDIOVASCULAR:  Regular rate and rhythm. 

ABDOMEN:  Soft, nontender without organomegaly or masses. 

RECTAL:  Deferred. 

EXTREMITIES:  Show bilateral edema.



LABORATORY DATA:  Shows admission hemoglobin of 9.8, going to 11.7 and now is stable

at 11.3, platelet count is 350,000.  PT is 14.8 with an INR of 1.1.  Chemistries,

potassium 3.4, calcium 7.4.  He is COVID positive. 



ASSESSMENT:  

1. Gastrointestinal bleed:  The patients are bleeding after receiving Lovenox for

deep vein thrombosis.  The etiology is unclear.  He does not want any endoscopic

evaluation and he would be difficult in endoscopic evaluation considering his

respiratory status. 

2. Coronavirus disease pneumonia.

3. Atrial fibrillation with rapid ventricular rate.

4. Deep vein thrombosis, started on Lovenox.

5. Congestive heart failure.



RECOMMENDATIONS:  

1. Serial H and H.

2. PPI.

3. Hold Lovenox, may need to consider IVC filter.

4. Monitor stools.

5. We will follow.







Job ID:  [id number]

## 2021-02-04 NOTE — PDOC.HOSPP
- Subjective


Subjective: 


feels better





- Objective


Vital Signs & Weight: 


                             Vital Signs (12 hours)











  Temp Pulse Resp BP Pulse Ox


 


 02/04/21 11:45  98.5 F  96  20  94/51 L  99


 


 02/04/21 09:42      98


 


 02/04/21 07:30  98.1 F  93  22 H  109/57 L  97


 


 02/04/21 04:00  97.9 F  91  20  97/54 L  93 L


 


 02/04/21 03:38      100








                                     Weight











Admit Weight                   201 lb


 


Weight                         192 lb 7.417 oz














I&O: 


                                        











 02/03/21 02/04/21 02/05/21





 06:59 06:59 06:59


 


Intake Total 60 986 


 


Output Total 1025 4108 


 


Balance -184 -648 











Result Diagrams: 


                                 02/04/21 04:42





                                 02/04/21 04:42





Hospitalist ROS





- Medication


Medications: 


Active Medications











Generic Name Dose Route Start Last Admin





  Trade Name Freq  PRN Reason Stop Dose Admin


 


Acetaminophen  650 mg  01/29/21 03:46  02/02/21 08:46





  Acetaminophen 325 Mg Tab  PO   650 mg





  Q4H PRN   Administration





  Headache/Fever/Mild Pain (1-3)  


 


Ascorbic Acid  1,000 mg  01/29/21 09:00  02/04/21 07:23





  Ascorbic Acid 500 Mg Chewable Tablet  PO   1,000 mg





  DAILY AMANDA   Administration


 


Aspirin  81 mg  01/29/21 21:00  02/03/21 21:03





  Aspirin Chewable 81 Mg Tab  PO   81 mg





  HS AMANDA   Administration


 


Bumetanide  1 mg  02/01/21 14:00  02/04/21 06:17





  Bumetanide 1 Mg/4 Ml Vial  IVP   1 mg





  0600,1400 AMANDA   Administration


 


Carvedilol  3.125 mg  01/29/21 09:00  02/04/21 07:24





  Carvedilol 3.125 Mg Tab  PO   3.125 mg





  BID AMANDA   Administration


 


Colchicine  0.6 mg  01/29/21 09:00  02/04/21 07:23





  Colchicine 0.6 Mg Tab  PO   0.6 mg





  BID AMANDA   Administration


 


Dexamethasone  6 mg  01/29/21 09:00  02/04/21 07:22





  Dexamethasone 4 Mg/Ml Vial  SLOW IVP   6 mg





  DAILY AMANDA   Administration


 


Guaifenesin/Dextromethorphan  15 ml  01/29/21 03:46  01/29/21 11:37





  Guaifenesin Dm 100-10/5 Ml Udcup  PO   15 ml





  Q4H PRN   Administration





  Cough  


 


Dopamine HCl/Dextrose  250 mls @ 17.095 mls/hr  02/01/21 13:30  02/04/21 01:45





  Dopamine 400 Mg/D5w 250 Ml  IVPB   250 mls





  INF AMANDA   Administration





  Protocol  





  5 MCG/KG/MIN  


 


Meropenem 1 gm/ Device  50 mls @ 100 mls/hr  02/01/21 14:00  02/04/21 06:17





  IVPB   50 mls





  Q8HR AMANDA   Administration


 


Potassium Chloride  16 meq  02/04/21 08:00  02/04/21 07:22





  Potassium Chloride 8 Meq Tab  PO   16 meq





  QAM-WM AMANDA   Administration


 


Sodium Chloride  10 ml  02/01/21 21:00  02/04/21 07:24





  Flush - Normal Saline 10 Ml Syringe  IVF   10 ml





  Q12HR AMANDA   Administration


 


Tamsulosin HCl  0.4 mg  01/29/21 09:00  02/04/21 07:23





  Tamsulosin Hcl 0.4 Mg Cap  PO   0.4 mg





  BID AMANDA   Administration


 


Zinc Sulfate  220 mg  01/29/21 09:00  02/04/21 07:24





  Zinc Sulfate 220 Mg Cap  PO   220 mg





  DAILY AMANDA   Administration














Hospitalist Exam


Vitals: 


                             Vital Signs (12 hours)











  Temp Pulse Resp BP Pulse Ox


 


 02/04/21 11:45  98.5 F  96  20  94/51 L  99


 


 02/04/21 09:42      98


 


 02/04/21 07:30  98.1 F  93  22 H  109/57 L  97


 


 02/04/21 04:00  97.9 F  91  20  97/54 L  93 L


 


 02/04/21 03:38      100








                                     Weight











Admit Weight                   201 lb


 


Weight                         192 lb 7.417 oz














General Appearance: NAD, awake alert


Eye: PERRL


ENT: normocephalic atraumatic


Neck: supple, symmetric, no JVD


Heart: RRR, no murmur, no gallops, no rubs


Respiratory: CTAB, no wheezes, no rales, no ronchi


Gastrointestinal: soft, non-tender, non-distended


Extremities: 1+ LE edema





Hosp A/P


(1) Atrial fibrillation with rapid ventricular response


Code(s): I48.91 - UNSPECIFIED ATRIAL FIBRILLATION   Status: Acute   





(2) COVID-19 virus infection


Code(s): U07.1 - COVID-19   Status: Acute   





(3) Urinary tract infection


Status: Acute   





(4) Leg edema


Code(s): R60.0 - LOCALIZED EDEMA   Status: Acute   





(5) CHF (congestive heart failure)


Code(s): I50.9 - HEART FAILURE, UNSPECIFIED   Status: Chronic   





(6) DVT (deep venous thrombosis)


Code(s): I82.409 - ACUTE EMBOLISM AND THOMBOS UNSP DEEP VN UNSP LOWER EXTREMITY 

 Status: Acute   





(7) GI bleed


Code(s): K92.2 - GASTROINTESTINAL HEMORRHAGE, UNSPECIFIED   Status: Acute   





- Plan





plan for today 2/3








Hypoxia  on high flow nasal cannula, try to wean.





Continue dexamethasone and enoxaparin.


The patient completed a course of remdesivir.


Negative fluid balance with past 24 hours with diuresis.


I would continue Bumex.


Patient is on dopamine per cardiology.


He remains in sinus rhythm.  Continue Coreg and Lovenox.BP on the low side, hold

PM coreg dose.


Continue meropenem for Pseudomonas UTI.


legs are cold, check pulses with doppler. 





plan for today 2/4





new diagnosis of DVT and while on Lovenox he had 2 episodes of melena/burgundy 

colored stools, he was started on Protonix and will have serial Hand H, type and

screen and consult with GI awaiting there input.


otherwise his respiratory status is improving.


Cardiology still managing his afib and fluid overload.

## 2021-02-05 LAB
ANION GAP SERPL CALC-SCNC: 14 MMOL/L (ref 10–20)
BASOPHILS # BLD AUTO: 0.1 THOU/UL (ref 0–0.2)
BASOPHILS NFR BLD AUTO: 0.6 % (ref 0–1)
BUN SERPL-MCNC: 25 MG/DL (ref 8.4–25.7)
CALCIUM SERPL-MCNC: 7.1 MG/DL (ref 7.8–10.44)
CHLORIDE SERPL-SCNC: 103 MMOL/L (ref 98–107)
CO2 SERPL-SCNC: 24 MMOL/L (ref 23–31)
CREAT CL PREDICTED SERPL C-G-VRATE: 100 ML/MIN (ref 70–130)
EOSINOPHIL # BLD AUTO: 0 THOU/UL (ref 0–0.7)
EOSINOPHIL NFR BLD AUTO: 0.2 % (ref 0–10)
GLUCOSE SERPL-MCNC: 94 MG/DL (ref 83–110)
HGB BLD-MCNC: 10.9 G/DL (ref 14–18)
HGB BLD-MCNC: 11.4 G/DL (ref 14–18)
HGB BLD-MCNC: 11.5 G/DL (ref 14–18)
LYMPHOCYTES # BLD: 1.4 THOU/UL (ref 1.2–3.4)
LYMPHOCYTES NFR BLD AUTO: 13.8 % (ref 21–51)
MCH RBC QN AUTO: 29 PG (ref 27–31)
MCV RBC AUTO: 95 FL (ref 78–98)
MONOCYTES # BLD AUTO: 0.5 THOU/UL (ref 0.11–0.59)
MONOCYTES NFR BLD AUTO: 4.9 % (ref 0–10)
NEUTROPHILS # BLD AUTO: 8.1 THOU/UL (ref 1.4–6.5)
NEUTROPHILS NFR BLD AUTO: 80.6 % (ref 42–75)
PLATELET # BLD AUTO: 311 THOU/UL (ref 130–400)
POTASSIUM SERPL-SCNC: 3.1 MMOL/L (ref 3.5–5.1)
RBC # BLD AUTO: 3.74 MILL/UL (ref 4.7–6.1)
SODIUM SERPL-SCNC: 138 MMOL/L (ref 136–145)
WBC # BLD AUTO: 10 THOU/UL (ref 4.8–10.8)

## 2021-02-05 RX ADMIN — MEROPENEM AND SODIUM CHLORIDE SCH MLS: 1 INJECTION, SOLUTION INTRAVENOUS at 05:44

## 2021-02-05 RX ADMIN — MEROPENEM AND SODIUM CHLORIDE SCH MLS: 1 INJECTION, SOLUTION INTRAVENOUS at 18:15

## 2021-02-05 RX ADMIN — Medication PRN ML: at 05:44

## 2021-02-05 RX ADMIN — Medication SCH MG: at 07:42

## 2021-02-05 RX ADMIN — Medication SCH ML: at 07:44

## 2021-02-05 RX ADMIN — MEROPENEM AND SODIUM CHLORIDE SCH: 1 INJECTION, SOLUTION INTRAVENOUS at 18:47

## 2021-02-05 RX ADMIN — ASPIRIN 81 MG CHEWABLE TABLET SCH MG: 81 TABLET CHEWABLE at 21:08

## 2021-02-05 RX ADMIN — Medication SCH ML: at 21:08

## 2021-02-05 NOTE — PRG
DATE OF SERVICE:  02/05/2021



SUBJECTIVE:  The patient has had no bleeding today.  He feels better.  He is still

thinking about IVC filter. 



OBJECTIVE:  VITAL SIGNS:  Temperature 98.5, pulse 99, respiratory rate 22, blood

pressure 84/54. 

CHEST:  Showed rhonchi. 

CARDIOVASCULAR:  Regular rate and rhythm. 

ABDOMEN:  Soft and nontender without organomegaly or masses.



LABORATORY DATA:  Hemoglobin was 10.9 today, this morning and 11.4 this afternoon.

Similar to previous levels. 



ASSESSMENT:  

1. Gastrointestinal bleed, this was after Lovenox for his DVT.  He was equivocal

about undergoing endoscopic evaluation or IVC filter placement.  At this point, I

would consider restarting minimal anticoagulation and see if he rebleeds.  If he

rebleeds then he would need a filter. 

2. Coronavirus pneumonia.

3. Atrial fibrillation.

4. Deep venous thrombosis.

5. Congestive heart failure.



RECOMMENDATIONS:  

1. Serial H and H.

2. Continue PPI.

3. Resume anticoagulation, consider IVC filter if rebleeds.







Job ID:  [id number]

## 2021-02-05 NOTE — PDOC.HOSPP
- Subjective


Encounter Date: 02/05/21


Subjective: 


states he feels better





- Objective


Vital Signs & Weight: 


                             Vital Signs (12 hours)











  Temp Pulse Resp BP Pulse Ox


 


 02/05/21 08:36      97


 


 02/05/21 04:05  97.7 F  99  18  116/71  96


 


 02/05/21 03:23      95


 


 02/05/21 00:00  97.7 F  91  18  97/64  96








                                     Weight











Admit Weight                   201 lb


 


Weight                         191 lb 9.307 oz














I&O: 


                                        











 02/04/21 02/05/21 02/06/21





 06:59 06:59 06:59


 


Intake Total 986 1091.5 


 


Output Total 3360 2850 


 


Balance -989 -1758.5 











Result Diagrams: 


                                 02/05/21 04:52





                                 02/05/21 04:52





Hospitalist ROS





- Medication


Medications: 


Active Medications











Generic Name Dose Route Start Last Admin





  Trade Name Freq  PRN Reason Stop Dose Admin


 


Acetaminophen  650 mg  01/29/21 03:46  02/02/21 08:46





  Acetaminophen 325 Mg Tab  PO   650 mg





  Q4H PRN   Administration





  Headache/Fever/Mild Pain (1-3)  


 


Ascorbic Acid  1,000 mg  01/29/21 09:00  02/05/21 07:42





  Ascorbic Acid 500 Mg Chewable Tablet  PO   1,000 mg





  DAILY AMANDA   Administration


 


Aspirin  81 mg  01/29/21 21:00  02/04/21 21:03





  Aspirin Chewable 81 Mg Tab  PO   81 mg





  HS AMANDA   Administration


 


Bumetanide  1 mg  02/01/21 14:00  02/05/21 05:44





  Bumetanide 1 Mg/4 Ml Vial  IVP   1 mg





  0600,1400 AMANDA   Administration


 


Carvedilol  3.125 mg  01/29/21 09:00  02/05/21 07:41





  Carvedilol 3.125 Mg Tab  PO   Not Given





  BID AMANDA  


 


Colchicine  0.6 mg  01/29/21 09:00  02/05/21 07:41





  Colchicine 0.6 Mg Tab  PO   0.6 mg





  BID AMANDA   Administration


 


Dexamethasone  6 mg  01/29/21 09:00  02/05/21 07:42





  Dexamethasone 4 Mg/Ml Vial  SLOW IVP   6 mg





  DAILY AMANDA   Administration


 


Guaifenesin/Dextromethorphan  15 ml  01/29/21 03:46  01/29/21 11:37





  Guaifenesin Dm 100-10/5 Ml Udcup  PO   15 ml





  Q4H PRN   Administration





  Cough  


 


Dopamine HCl/Dextrose  250 mls @ 17.095 mls/hr  02/01/21 13:30  02/05/21 05:44





  Dopamine 400 Mg/D5w 250 Ml  IVPB   250 mls





  INF AMANDA   Administration





  Protocol  





  5 MCG/KG/MIN  


 


Meropenem 1 gm/ Device  50 mls @ 100 mls/hr  02/01/21 14:00  02/05/21 05:44





  IVPB   50 mls





  Q8HR AMANDA   Administration


 


Pantoprazole Sodium  40 mg  02/04/21 21:00  02/05/21 07:41





  Pantoprazole 40 Mg Vial  IVP   40 mg





  Q12HR AMANDA   Administration


 


Potassium Chloride  16 meq  02/04/21 08:00  02/05/21 07:41





  Potassium Chloride 8 Meq Tab  PO   16 meq





  QAM-WM AMANDA   Administration


 


Sodium Chloride  10 ml  02/01/21 21:00  02/05/21 07:44





  Flush - Normal Saline 10 Ml Syringe  IVF   10 ml





  Q12HR AMANDA   Administration


 


Sodium Chloride  10 ml  02/01/21 13:30  02/05/21 05:44





  Flush - Normal Saline 10 Ml Syringe  IVF   10 ml





  PRN PRN   Administration





  Saline Flush  


 


Tamsulosin HCl  0.4 mg  01/29/21 09:00  02/05/21 07:42





  Tamsulosin Hcl 0.4 Mg Cap  PO   0.4 mg





  BID AMANDA   Administration


 


Zinc Sulfate  220 mg  01/29/21 09:00  02/05/21 07:42





  Zinc Sulfate 220 Mg Cap  PO   220 mg





  DAILY AMANDA   Administration














Hospitalist Exam


Vitals: 


                             Vital Signs (12 hours)











  Temp Pulse Resp BP Pulse Ox


 


 02/05/21 08:36      97


 


 02/05/21 04:05  97.7 F  99  18  116/71  96


 


 02/05/21 03:23      95


 


 02/05/21 00:00  97.7 F  91  18  97/64  96








                                     Weight











Admit Weight                   201 lb


 


Weight                         191 lb 9.307 oz














General Appearance: NAD


Eye: PERRL


ENT: normocephalic atraumatic


Neck: supple, symmetric


Heart: RRR, no murmur


Respiratory: CTAB


Gastrointestinal: soft, non-tender


Extremities: 1+ LE edema





Hosp A/P


(1) Atrial fibrillation with rapid ventricular response


Code(s): I48.91 - UNSPECIFIED ATRIAL FIBRILLATION   Status: Acute   





(2) COVID-19 virus infection


Code(s): U07.1 - COVID-19   Status: Acute   





(3) Urinary tract infection


Status: Acute   





(4) Leg edema


Code(s): R60.0 - LOCALIZED EDEMA   Status: Acute   





(5) CHF (congestive heart failure)


Code(s): I50.9 - HEART FAILURE, UNSPECIFIED   Status: Chronic   





(6) DVT (deep venous thrombosis)


Code(s): I82.409 - ACUTE EMBOLISM AND THOMBOS UNSP DEEP VN UNSP LOWER EXTREMITY 

 Status: Acute   





(7) GI bleed


Code(s): K92.2 - GASTROINTESTINAL HEMORRHAGE, UNSPECIFIED   Status: Acute   





- Plan





plan for today 2/3








Hypoxia  on high flow nasal cannula, try to wean.





Continue dexamethasone and enoxaparin.


The patient completed a course of remdesivir.


Negative fluid balance with past 24 hours with diuresis.


I would continue Bumex.


Patient is on dopamine per cardiology.


He remains in sinus rhythm.  Continue Coreg and Lovenox.BP on the low side, hold

PM coreg dose.


Continue meropenem for Pseudomonas UTI.


legs are cold, check pulses with doppler. 





plan for today 2/4





new diagnosis of DVT and while on Lovenox he had 2 episodes of melena/burgundy 

colored stools, he was started on Protonix and will have serial Hand H, type and

screen and consult with GI awaiting there input.


otherwise his respiratory status is improving.


Cardiology still managing his afib and fluid overload.





plan for today 2/5





Patient had another episode of rectal bleed, his hgb remains fairly stable, his 

bleed is most likely slow but might get worse with anticoagulation which is on 

hold.


I spoke in length with his wife bout the need for an IVC filter also his 

prognosis, she will speak with him and let me know asap.


he is oxygenating better and  now is on NC.


He ramains on dopamine as per Cardiology.


K was low and will be replaced.

## 2021-02-06 LAB
ANION GAP SERPL CALC-SCNC: 14 MMOL/L (ref 10–20)
ANION GAP SERPL CALC-SCNC: 14 MMOL/L (ref 10–20)
BASOPHILS # BLD AUTO: 0 THOU/UL (ref 0–0.2)
BASOPHILS # BLD AUTO: 0 THOU/UL (ref 0–0.2)
BASOPHILS NFR BLD AUTO: 0.2 % (ref 0–1)
BASOPHILS NFR BLD AUTO: 0.3 % (ref 0–1)
BUN SERPL-MCNC: 25 MG/DL (ref 8.4–25.7)
BUN SERPL-MCNC: 27 MG/DL (ref 8.4–25.7)
CALCIUM SERPL-MCNC: 6.9 MG/DL (ref 7.8–10.44)
CALCIUM SERPL-MCNC: 7.3 MG/DL (ref 7.8–10.44)
CHLORIDE SERPL-SCNC: 100 MMOL/L (ref 98–107)
CHLORIDE SERPL-SCNC: 103 MMOL/L (ref 98–107)
CO2 SERPL-SCNC: 22 MMOL/L (ref 23–31)
CO2 SERPL-SCNC: 27 MMOL/L (ref 23–31)
CREAT CL PREDICTED SERPL C-G-VRATE: 101 ML/MIN (ref 70–130)
CREAT CL PREDICTED SERPL C-G-VRATE: 103 ML/MIN (ref 70–130)
EOSINOPHIL # BLD AUTO: 0 THOU/UL (ref 0–0.7)
EOSINOPHIL # BLD AUTO: 0.1 THOU/UL (ref 0–0.7)
EOSINOPHIL NFR BLD AUTO: 0.2 % (ref 0–10)
EOSINOPHIL NFR BLD AUTO: 0.7 % (ref 0–10)
GLUCOSE SERPL-MCNC: 111 MG/DL (ref 83–110)
GLUCOSE SERPL-MCNC: 96 MG/DL (ref 83–110)
HGB BLD-MCNC: 10.8 G/DL (ref 14–18)
HGB BLD-MCNC: 11.4 G/DL (ref 14–18)
HGB BLD-MCNC: 11.5 G/DL (ref 14–18)
LYMPHOCYTES # BLD: 0.7 THOU/UL (ref 1.2–3.4)
LYMPHOCYTES # BLD: 1.1 THOU/UL (ref 1.2–3.4)
LYMPHOCYTES NFR BLD AUTO: 15.6 % (ref 21–51)
LYMPHOCYTES NFR BLD AUTO: 8 % (ref 21–51)
MAGNESIUM SERPL-MCNC: 2.2 MG/DL (ref 1.6–2.6)
MCH RBC QN AUTO: 29.3 PG (ref 27–31)
MCH RBC QN AUTO: 29.6 PG (ref 27–31)
MCV RBC AUTO: 94.7 FL (ref 78–98)
MCV RBC AUTO: 95.4 FL (ref 78–98)
MONOCYTES # BLD AUTO: 0.2 THOU/UL (ref 0.11–0.59)
MONOCYTES # BLD AUTO: 0.5 THOU/UL (ref 0.11–0.59)
MONOCYTES NFR BLD AUTO: 2.5 % (ref 0–10)
MONOCYTES NFR BLD AUTO: 6.7 % (ref 0–10)
NEUTROPHILS # BLD AUTO: 5.3 THOU/UL (ref 1.4–6.5)
NEUTROPHILS # BLD AUTO: 7.6 THOU/UL (ref 1.4–6.5)
NEUTROPHILS NFR BLD AUTO: 76.6 % (ref 42–75)
NEUTROPHILS NFR BLD AUTO: 89.2 % (ref 42–75)
PLATELET # BLD AUTO: 293 THOU/UL (ref 130–400)
PLATELET # BLD AUTO: 297 THOU/UL (ref 130–400)
POTASSIUM SERPL-SCNC: 3.5 MMOL/L (ref 3.5–5.1)
POTASSIUM SERPL-SCNC: 4.4 MMOL/L (ref 3.5–5.1)
RBC # BLD AUTO: 3.65 MILL/UL (ref 4.7–6.1)
RBC # BLD AUTO: 3.92 MILL/UL (ref 4.7–6.1)
SODIUM SERPL-SCNC: 135 MMOL/L (ref 136–145)
SODIUM SERPL-SCNC: 137 MMOL/L (ref 136–145)
WBC # BLD AUTO: 6.9 THOU/UL (ref 4.8–10.8)
WBC # BLD AUTO: 8.5 THOU/UL (ref 4.8–10.8)

## 2021-02-06 RX ADMIN — Medication SCH MG: at 07:35

## 2021-02-06 RX ADMIN — Medication SCH ML: at 07:34

## 2021-02-06 RX ADMIN — MEROPENEM AND SODIUM CHLORIDE SCH MLS: 1 INJECTION, SOLUTION INTRAVENOUS at 10:52

## 2021-02-06 RX ADMIN — MEROPENEM AND SODIUM CHLORIDE SCH MLS: 1 INJECTION, SOLUTION INTRAVENOUS at 02:10

## 2021-02-06 RX ADMIN — ASPIRIN 81 MG CHEWABLE TABLET SCH MG: 81 TABLET CHEWABLE at 20:13

## 2021-02-06 RX ADMIN — Medication PRN ML: at 02:11

## 2021-02-06 RX ADMIN — Medication SCH ML: at 20:13

## 2021-02-06 RX ADMIN — MEROPENEM AND SODIUM CHLORIDE SCH MLS: 1 INJECTION, SOLUTION INTRAVENOUS at 18:11

## 2021-02-06 NOTE — PRG
DATE OF SERVICE:  02/06/2021



SUBJECTIVE:  According to nursing personnel, the patient has not had any bowel

movements.  He is no longer bleeding.  He has had problems with his blood pressure

and he has had some hypotensive episodes.  He is on high-flow oxygen.  He changes

mind about IVC filter versus no filter, hospice versus no hospice. 



OBJECTIVE:  VITAL SIGNS:  Temperature 98.6, pulse 71, respiratory rate 16, blood

pressure 118/66. 

CHEST:  Showed rhonchi. 

ABDOMEN:  Benign.



LABORATORY DATA:  Shows a white blood cell count of 10.8, hematocrit 34.5.

Chemistries significant for sodium 135, CO2 of 22, glucose of 111. 



ASSESSMENT:  

1. Gastrointestinal bleeding-the patient seems to have stopped bleeding, off the

Lovenox. 

2. Coronavirus pneumonia.

3. Atrial fibrillation with rapid ventricular rate.

4. Deep vein thrombosis.

5. Congestive heart failure.



RECOMMENDATIONS:  

1. Serial H and H.

2. Continue PPI.  Resume anticoagulation, consider IVC filter if rebleed.

3. We will follow at a distance.







Job ID:  [id number]

## 2021-02-06 NOTE — PDOC.HOSPP
- Subjective


Subjective: 


appears comfortable





- Objective


Vital Signs & Weight: 


                             Vital Signs (12 hours)











  Temp Pulse Resp BP Pulse Ox


 


 02/06/21 13:07   71   118/66 


 


 02/06/21 12:20     78/53 L 


 


 02/06/21 11:28     114/60 


 


 02/06/21 11:07   94  16  120/64  100


 


 02/06/21 10:40     70/52 L 


 


 02/06/21 10:36     69/46 L 


 


 02/06/21 08:07   96   103/59 L 


 


 02/06/21 07:45  98.6 F  95  25 H  116/69  100


 


 02/06/21 07:36      100


 


 02/06/21 03:41  98.2 F  90  20  107/66  100








                                     Weight











Admit Weight                   201 lb


 


Weight                         191 lb 9.307 oz














I&O: 


                                        











 02/05/21 02/06/21 02/07/21





 06:59 06:59 06:59


 


Intake Total 1091.5 874.9 


 


Output Total 2850 2600 


 


Balance -1758.5 -1725.1 











Result Diagrams: 


                                 02/06/21 05:01





                                 02/06/21 12:41





Hospitalist ROS





- Medication


Medications: 


Active Medications











Generic Name Dose Route Start Last Admin





  Trade Name Freq  PRN Reason Stop Dose Admin


 


Acetaminophen  650 mg  01/29/21 03:46  02/02/21 08:46





  Acetaminophen 325 Mg Tab  PO   650 mg





  Q4H PRN   Administration





  Headache/Fever/Mild Pain (1-3)  


 


Ascorbic Acid  1,000 mg  01/29/21 09:00  02/06/21 07:35





  Ascorbic Acid 500 Mg Chewable Tablet  PO   1,000 mg





  DAILY AMANDA   Administration


 


Aspirin  81 mg  01/29/21 21:00  02/05/21 21:08





  Aspirin Chewable 81 Mg Tab  PO   81 mg





  HS AMANDA   Administration


 


Colchicine  0.6 mg  01/29/21 09:00  02/06/21 08:07





  Colchicine 0.6 Mg Tab  PO   Not Given





  BID AMANDA  


 


Dexamethasone  6 mg  01/29/21 09:00  02/06/21 07:35





  Dexamethasone 4 Mg/Ml Vial  SLOW IVP   6 mg





  DAILY AMANDA   Administration


 


Guaifenesin/Dextromethorphan  15 ml  01/29/21 03:46  01/29/21 11:37





  Guaifenesin Dm 100-10/5 Ml Udcup  PO   15 ml





  Q4H PRN   Administration





  Cough  


 


Meropenem 1 gm/ Device  50 mls @ 100 mls/hr  02/05/21 18:00  02/06/21 10:52





  IVPB   50 mls





  0200,1000,1800 AMANDA   Administration


 


Pantoprazole Sodium  40 mg  02/04/21 21:00  02/06/21 07:34





  Pantoprazole 40 Mg Vial  IVP   40 mg





  Q12HR AMANDA   Administration


 


Potassium Chloride  16 meq  02/04/21 08:00  02/06/21 07:34





  Potassium Chloride 8 Meq Tab  PO   16 meq





  QAM-WM AMANDA   Administration


 


Sodium Chloride  10 ml  02/01/21 21:00  02/06/21 07:34





  Flush - Normal Saline 10 Ml Syringe  IVF   10 ml





  Q12HR AMANDA   Administration


 


Sodium Chloride  10 ml  02/01/21 13:30  02/06/21 02:11





  Flush - Normal Saline 10 Ml Syringe  IVF   10 ml





  PRN PRN   Administration





  Saline Flush  


 


Tamsulosin HCl  0.4 mg  01/29/21 09:00  02/06/21 07:34





  Tamsulosin Hcl 0.4 Mg Cap  PO   0.4 mg





  BID AMANDA   Administration


 


Zinc Sulfate  220 mg  01/29/21 09:00  02/06/21 07:35





  Zinc Sulfate 220 Mg Cap  PO   220 mg





  DAILY AMANDA   Administration














Hospitalist Exam


Vitals: 


                             Vital Signs (12 hours)











  Temp Pulse Resp BP Pulse Ox


 


 02/06/21 13:07   71   118/66 


 


 02/06/21 12:20     78/53 L 


 


 02/06/21 11:28     114/60 


 


 02/06/21 11:07   94  16  120/64  100


 


 02/06/21 10:40     70/52 L 


 


 02/06/21 10:36     69/46 L 


 


 02/06/21 08:07   96   103/59 L 


 


 02/06/21 07:45  98.6 F  95  25 H  116/69  100


 


 02/06/21 07:36      100


 


 02/06/21 03:41  98.2 F  90  20  107/66  100








                                     Weight











Admit Weight                   201 lb


 


Weight                         191 lb 9.307 oz














General Appearance: NAD


Eye: PERRL


ENT: normocephalic atraumatic


Neck: supple, symmetric


Heart: RRR, no murmur, no gallops


Respiratory: CTAB, no wheezes, no rales


Gastrointestinal: soft, non-tender


Extremities: 1+ LE edema, 2+ LE edema





Hosp A/P


(1) Atrial fibrillation with rapid ventricular response


Code(s): I48.91 - UNSPECIFIED ATRIAL FIBRILLATION   Status: Acute   





(2) COVID-19 virus infection


Code(s): U07.1 - COVID-19   Status: Acute   





(3) Urinary tract infection


Status: Acute   





(4) Leg edema


Code(s): R60.0 - LOCALIZED EDEMA   Status: Acute   





(5) CHF (congestive heart failure)


Code(s): I50.9 - HEART FAILURE, UNSPECIFIED   Status: Chronic   





(6) DVT (deep venous thrombosis)


Code(s): I82.409 - ACUTE EMBOLISM AND THOMBOS UNSP DEEP VN UNSP LOWER EXTREMITY 

 Status: Acute   





(7) GI bleed


Code(s): K92.2 - GASTROINTESTINAL HEMORRHAGE, UNSPECIFIED   Status: Acute   





- Plan





plan for today 2/3








Hypoxia  on high flow nasal cannula, try to wean.





Continue dexamethasone and enoxaparin.


The patient completed a course of remdesivir.


Negative fluid balance with past 24 hours with diuresis.


I would continue Bumex.


Patient is on dopamine per cardiology.


He remains in sinus rhythm.  Continue Coreg and Lovenox.BP on the low side, hold

PM coreg dose.


Continue meropenem for Pseudomonas UTI.


legs are cold, check pulses with doppler. 





plan for today 2/4





new diagnosis of DVT and while on Lovenox he had 2 episodes of melena/burgundy 

colored stools, he was started on Protonix and will have serial Hand H, type and

screen and consult with GI awaiting there input.


otherwise his respiratory status is improving.


Cardiology still managing his afib and fluid overload.





plan for today 2/5





Patient had another episode of rectal bleed, his hgb remains fairly stable, his 

bleed is most likely slow but might get worse with anticoagulation which is on 

hold.


I spoke in length with his wife bout the need for an IVC filter also his 

prognosis, she will speak with him and let me know asap.


he is oxygenating better and  now is on NC.


He ramains on dopamine as per Cardiology.


K was low and will be replaced.  








plan for today 2/6





patient had 2 episodes of hypotension most likely due to overdiuresis,  re

sponded to IVF.His Dopamine is increased, his level of care is increased to IMCU

status.


he has not had an blood in his stool over the past 24 h, I discussed with GI and

Cardiology, we will start heparin drip without a bolus, continue with serial H 

and H.


He is a full code still, but his wife understand that he is fragile. I have 

discussed with her the above plan.

## 2021-02-07 LAB
ANALYZER IN CARDIO: (no result)
ANALYZER IN CARDIO: (no result)
ANION GAP SERPL CALC-SCNC: 10 MMOL/L (ref 10–20)
APTT PPP: (no result) SEC (ref 22.9–36.1)
APTT PPP: 117.8 SEC (ref 22.9–36.1)
APTT PPP: 207 SEC (ref 22.9–36.1)
BASE EXCESS STD BLDA CALC-SCNC: 3.7 MEQ/L
BASE EXCESS STD BLDA CALC-SCNC: 3.7 MEQ/L
BASOPHILS # BLD AUTO: 0 THOU/UL (ref 0–0.2)
BASOPHILS NFR BLD AUTO: 0.3 % (ref 0–1)
BUN SERPL-MCNC: 22 MG/DL (ref 8.4–25.7)
CA-I BLDA-SCNC: 1.03 MMOL/L (ref 1.12–1.3)
CA-I BLDA-SCNC: 1.03 MMOL/L (ref 1.12–1.3)
CALCIUM SERPL-MCNC: 7.3 MG/DL (ref 7.8–10.44)
CHLORIDE SERPL-SCNC: 101 MMOL/L (ref 98–107)
CO2 SERPL-SCNC: 31 MMOL/L (ref 23–31)
CREAT CL PREDICTED SERPL C-G-VRATE: 98 ML/MIN (ref 70–130)
EOSINOPHIL # BLD AUTO: 0.1 THOU/UL (ref 0–0.7)
EOSINOPHIL NFR BLD AUTO: 1.1 % (ref 0–10)
GLUCOSE SERPL-MCNC: 110 MG/DL (ref 83–110)
HCO3 BLDA-SCNC: 25.9 MEQ/L (ref 22–28)
HCO3 BLDA-SCNC: 25.9 MEQ/L (ref 22–28)
HCT VFR BLDA CALC: 32 % (ref 42–52)
HCT VFR BLDA CALC: 32 % (ref 42–52)
HGB BLD-MCNC: 10.8 G/DL (ref 14–18)
HGB BLD-MCNC: 10.9 G/DL (ref 14–18)
HGB BLD-MCNC: 11.1 G/DL (ref 14–18)
HGB BLDA-MCNC: 11 G/DL (ref 14–18)
HGB BLDA-MCNC: 11 G/DL (ref 14–18)
LYMPHOCYTES # BLD: 1 THOU/UL (ref 1.2–3.4)
LYMPHOCYTES NFR BLD AUTO: 11.7 % (ref 21–51)
MCH RBC QN AUTO: 30.3 PG (ref 27–31)
MCV RBC AUTO: 95.5 FL (ref 78–98)
MONOCYTES # BLD AUTO: 0.6 THOU/UL (ref 0.11–0.59)
MONOCYTES NFR BLD AUTO: 6.7 % (ref 0–10)
NEUTROPHILS # BLD AUTO: 6.8 THOU/UL (ref 1.4–6.5)
NEUTROPHILS NFR BLD AUTO: 80.2 % (ref 42–75)
PCO2 BLDA: 30.8 MMHG (ref 35–45)
PCO2 BLDA: 30.8 MMHG (ref 35–45)
PH BLDA: 7.54 [PH] (ref 7.35–7.45)
PH BLDA: 7.54 [PH] (ref 7.35–7.45)
PLATELET # BLD AUTO: 313 THOU/UL (ref 130–400)
PO2 BLDA: 48.8 MMHG (ref 70–?)
PO2 BLDA: 48.8 MMHG (ref 70–?)
POTASSIUM BLD-SCNC: 3.98 MMOL/L (ref 3.7–5.3)
POTASSIUM BLD-SCNC: 3.98 MMOL/L (ref 3.7–5.3)
POTASSIUM SERPL-SCNC: 3.9 MMOL/L (ref 3.5–5.1)
RBC # BLD AUTO: 3.57 MILL/UL (ref 4.7–6.1)
SODIUM SERPL-SCNC: 138 MMOL/L (ref 136–145)
SPECIMEN DRAWN FROM PATIENT: (no result)
SPECIMEN DRAWN FROM PATIENT: (no result)
TROPONIN I SERPL DL<=0.01 NG/ML-MCNC: 0.05 NG/ML (ref ?–0.03)
WBC # BLD AUTO: 8.5 THOU/UL (ref 4.8–10.8)

## 2021-02-07 PROCEDURE — 5A09457 ASSISTANCE WITH RESPIRATORY VENTILATION, 24-96 CONSECUTIVE HOURS, CONTINUOUS POSITIVE AIRWAY PRESSURE: ICD-10-PCS | Performed by: INTERNAL MEDICINE

## 2021-02-07 RX ADMIN — Medication SCH MG: at 07:41

## 2021-02-07 RX ADMIN — MEROPENEM AND SODIUM CHLORIDE SCH MLS: 1 INJECTION, SOLUTION INTRAVENOUS at 02:15

## 2021-02-07 RX ADMIN — Medication SCH ML: at 21:27

## 2021-02-07 RX ADMIN — MEROPENEM AND SODIUM CHLORIDE SCH MLS: 1 INJECTION, SOLUTION INTRAVENOUS at 17:41

## 2021-02-07 RX ADMIN — MEROPENEM AND SODIUM CHLORIDE SCH MLS: 1 INJECTION, SOLUTION INTRAVENOUS at 11:48

## 2021-02-07 RX ADMIN — ASPIRIN 81 MG CHEWABLE TABLET SCH MG: 81 TABLET CHEWABLE at 21:25

## 2021-02-07 RX ADMIN — Medication SCH ML: at 07:48

## 2021-02-07 NOTE — RAD
CHEST 1 VIEW:

 

Date:  02/07/2021

 

INDICATION:

History of Code Green. 

 

COMPARISON:  

Prior exam dated 02/01/2021. 

 

FINDINGS:

There is cardiomegaly. Small left pleural effusion with left basilar atelectasis. Right lung is clear
. No pneumothorax is evident. No acute osseous abnormality is demonstrated. 

 

IMPRESSION: 

Pleural parenchymal opacity of the left lung base suspicious for small left pleural effusion and comp
onents of left basilar atelectasis. Air space disease of the right lung is improved from comparison d
ated 02/01/2021. Mild cardiomegaly is stable. No pneumothorax is evident. 

 

 

POS: BH

## 2021-02-07 NOTE — PDOC.HOSPP
- Subjective


Encounter Date: 02/07/21


Subjective: 


well appearing





- Objective


Vital Signs & Weight: 


                             Vital Signs (12 hours)











  Temp Pulse Pulse Pulse Pulse Pulse Pulse


 


 02/07/21 12:40       


 


 02/07/21 12:33  97.9 F  103 H     


 


 02/07/21 10:30   102 H     


 


 02/07/21 08:15  97.5 F L  96     


 


 02/07/21 07:38       


 


 02/07/21 06:20  98.3 F  103 H     


 


 02/07/21 05:15       


 


 02/07/21 04:21    104 H  90  93  98  99


 


 02/07/21 04:00  98.3 F  89     


 


 02/07/21 03:23       














  Pulse Pulse Resp Resp Resp Resp Resp


 


 02/07/21 12:40       


 


 02/07/21 12:33    24 H    


 


 02/07/21 10:30    19    


 


 02/07/21 08:15    27 H    


 


 02/07/21 07:38       


 


 02/07/21 06:20    24 H    


 


 02/07/21 05:15       


 


 02/07/21 04:21  90  100   22 H  22 H  26 H  26 H


 


 02/07/21 04:00    14    


 


 02/07/21 03:23       














  Resp Resp Resp BP BP BP BP


 


 02/07/21 12:40       


 


 02/07/21 12:33       


 


 02/07/21 10:30       


 


 02/07/21 08:15       


 


 02/07/21 07:38       


 


 02/07/21 06:20       


 


 02/07/21 05:15       


 


 02/07/21 04:21  25 H  18  27 H  93/59 L  90/61  132/88  150/92 H


 


 02/07/21 04:00       


 


 02/07/21 03:23       














  BP Pulse Ox Pulse Ox Pulse Ox Pulse Ox Pulse Ox Pulse Ox


 


 02/07/21 12:40   100     


 


 02/07/21 12:33  114/67  99     


 


 02/07/21 10:30  123/67  100     


 


 02/07/21 08:15  99/71  100     


 


 02/07/21 07:38   100     


 


 02/07/21 06:20  105/74  100     


 


 02/07/21 05:15   92 L     


 


 02/07/21 04:21    78 L  88 L  86 L  96  97


 


 02/07/21 04:00  130/86  100     


 


 02/07/21 03:23   99     














  Pulse Ox Pulse Ox


 


 02/07/21 12:40  


 


 02/07/21 12:33  


 


 02/07/21 10:30  


 


 02/07/21 08:15  


 


 02/07/21 07:38  


 


 02/07/21 06:20  


 


 02/07/21 05:15  


 


 02/07/21 04:21  99  98


 


 02/07/21 04:00  


 


 02/07/21 03:23  








                                     Weight











Admit Weight                   201 lb


 


Weight                         186 lb 4.65 oz














I&O: 


                                        











 02/06/21 02/07/21 02/08/21





 06:59 06:59 06:59


 


Intake Total 874.9 278 


 


Output Total 2600 750 


 


Balance -1725.1 -472 











Result Diagrams: 


                                 02/07/21 08:58





                                 02/07/21 05:15


Additional Labs: 


                                   Accuchecks











  02/07/21





  04:26


 


POC Glucose  125 H














Hospitalist ROS





- Medication


Medications: 


Active Medications











Generic Name Dose Route Start Last Admin





  Trade Name Freq  PRN Reason Stop Dose Admin


 


Acetaminophen  650 mg  01/29/21 03:46  02/02/21 08:46





  Acetaminophen 325 Mg Tab  PO   650 mg





  Q4H PRN   Administration





  Headache/Fever/Mild Pain (1-3)  


 


Ascorbic Acid  1,000 mg  01/29/21 09:00  02/07/21 07:41





  Ascorbic Acid 500 Mg Chewable Tablet  PO   1,000 mg





  DAILY AMANDA   Administration


 


Aspirin  81 mg  01/29/21 21:00  02/06/21 20:13





  Aspirin Chewable 81 Mg Tab  PO   81 mg





  HS AMANDA   Administration


 


Colchicine  0.6 mg  01/29/21 09:00  02/07/21 11:04





  Colchicine 0.6 Mg Tab  PO   Not Given





  BID AMANDA  


 


Dexamethasone  6 mg  01/29/21 09:00  02/07/21 07:42





  Dexamethasone 4 Mg/Ml Vial  SLOW IVP   6 mg





  DAILY AMANDA   Administration


 


Guaifenesin/Dextromethorphan  15 ml  01/29/21 03:46  01/29/21 11:37





  Guaifenesin Dm 100-10/5 Ml Udcup  PO   15 ml





  Q4H PRN   Administration





  Cough  


 


Heparin Sodium (Porcine)  0 units  02/06/21 15:30  02/07/21 11:48





  Heparin 10,000 Units/ 10 Ml Vial  SLOW IVP   6,760 units





  ASDIR AMANDA   Administration





  Protocol  


 


Meropenem 1 gm/ Device  50 mls @ 100 mls/hr  02/05/21 18:00  02/07/21 11:48





  IVPB   50 mls





  0200,1000,1800 AMANDA   Administration


 


Heparin Sodium/Dextrose  500 mls @ 0 mls/hr  02/06/21 15:30  02/06/21 19:35





  Heparin 25,000 Units/D5w  IVPB   500 mls





  INF AMANDA   Administration





  Protocol  





  Per Protocol  


 


Dopamine HCl/Dextrose  250 mls @ 25.642 mls/hr  02/06/21 20:45  02/07/21 13:13





  Dopamine 400 Mg/D5w 250 Ml  IVPB   250 mls





  INF AMANDA   Administration





  Protocol  





  7.5 MCG/KG/MIN  


 


Pantoprazole Sodium  40 mg  02/04/21 21:00  02/07/21 07:47





  Pantoprazole 40 Mg Vial  IVP   40 mg





  Q12HR AMANDA   Administration


 


Potassium Chloride  16 meq  02/04/21 08:00  02/07/21 07:49





  Potassium Chloride 8 Meq Tab  PO   16 meq





  QAM-WM AMANDA   Administration


 


Sodium Chloride  10 ml  02/01/21 21:00  02/07/21 07:48





  Flush - Normal Saline 10 Ml Syringe  IVF   10 ml





  Q12HR AMANDA   Administration


 


Sodium Chloride  10 ml  02/01/21 13:30  02/06/21 02:11





  Flush - Normal Saline 10 Ml Syringe  IVF   10 ml





  PRN PRN   Administration





  Saline Flush  


 


Tamsulosin HCl  0.4 mg  01/29/21 09:00  02/07/21 07:46





  Tamsulosin Hcl 0.4 Mg Cap  PO   0.4 mg





  BID AMANDA   Administration


 


Zinc Sulfate  220 mg  01/29/21 09:00  02/07/21 07:47





  Zinc Sulfate 220 Mg Cap  PO   220 mg





  DAILY AMANDA   Administration














Hospitalist Exam


Vitals: 


                             Vital Signs (12 hours)











  Temp Pulse Pulse Pulse Pulse Pulse Pulse


 


 02/07/21 12:40       


 


 02/07/21 12:33  97.9 F  103 H     


 


 02/07/21 10:30   102 H     


 


 02/07/21 08:15  97.5 F L  96     


 


 02/07/21 07:38       


 


 02/07/21 06:20  98.3 F  103 H     


 


 02/07/21 05:15       


 


 02/07/21 04:21    104 H  90  93  98  99


 


 02/07/21 04:00  98.3 F  89     


 


 02/07/21 03:23       














  Pulse Pulse Resp Resp Resp Resp Resp


 


 02/07/21 12:40       


 


 02/07/21 12:33    24 H    


 


 02/07/21 10:30    19    


 


 02/07/21 08:15    27 H    


 


 02/07/21 07:38       


 


 02/07/21 06:20    24 H    


 


 02/07/21 05:15       


 


 02/07/21 04:21  90  100   22 H  22 H  26 H  26 H


 


 02/07/21 04:00    14    


 


 02/07/21 03:23       














  Resp Resp Resp BP BP BP BP


 


 02/07/21 12:40       


 


 02/07/21 12:33       


 


 02/07/21 10:30       


 


 02/07/21 08:15       


 


 02/07/21 07:38       


 


 02/07/21 06:20       


 


 02/07/21 05:15       


 


 02/07/21 04:21  25 H  18  27 H  93/59 L  90/61  132/88  150/92 H


 


 02/07/21 04:00       


 


 02/07/21 03:23       














  BP Pulse Ox Pulse Ox Pulse Ox Pulse Ox Pulse Ox Pulse Ox


 


 02/07/21 12:40   100     


 


 02/07/21 12:33  114/67  99     


 


 02/07/21 10:30  123/67  100     


 


 02/07/21 08:15  99/71  100     


 


 02/07/21 07:38   100     


 


 02/07/21 06:20  105/74  100     


 


 02/07/21 05:15   92 L     


 


 02/07/21 04:21    78 L  88 L  86 L  96  97


 


 02/07/21 04:00  130/86  100     


 


 02/07/21 03:23   99     














  Pulse Ox Pulse Ox


 


 02/07/21 12:40  


 


 02/07/21 12:33  


 


 02/07/21 10:30  


 


 02/07/21 08:15  


 


 02/07/21 07:38  


 


 02/07/21 06:20  


 


 02/07/21 05:15  


 


 02/07/21 04:21  99  98


 


 02/07/21 04:00  


 


 02/07/21 03:23  








                                     Weight











Admit Weight                   201 lb


 


Weight                         186 lb 4.65 oz














General Appearance: NAD


Eye: PERRL, anicteric sclera


ENT: normocephalic atraumatic


Neck: supple, symmetric


Heart: RRR, no murmur


Respiratory: CTAB, rales


Gastrointestinal: soft, non-tender


Extremities: no cyanosis





Hosp A/P


(1) Atrial fibrillation with rapid ventricular response


Code(s): I48.91 - UNSPECIFIED ATRIAL FIBRILLATION   Status: Acute   





(2) COVID-19 virus infection


Code(s): U07.1 - COVID-19   Status: Acute   





(3) Urinary tract infection


Status: Acute   





(4) Leg edema


Code(s): R60.0 - LOCALIZED EDEMA   Status: Acute   





(5) CHF (congestive heart failure)


Code(s): I50.9 - HEART FAILURE, UNSPECIFIED   Status: Chronic   





(6) DVT (deep venous thrombosis)


Code(s): I82.409 - ACUTE EMBOLISM AND THOMBOS UNSP DEEP VN UNSP LOWER EXTREMITY 

 Status: Acute   





(7) GI bleed


Code(s): K92.2 - GASTROINTESTINAL HEMORRHAGE, UNSPECIFIED   Status: Acute   





- Plan





plan for today 2/3








Hypoxia  on high flow nasal cannula, try to wean.





Continue dexamethasone and enoxaparin.


The patient completed a course of remdesivir.


Negative fluid balance with past 24 hours with diuresis.


I would continue Bumex.


Patient is on dopamine per cardiology.


He remains in sinus rhythm.  Continue Coreg and Lovenox.BP on the low side, hold

PM coreg dose.


Continue meropenem for Pseudomonas UTI.


legs are cold, check pulses with doppler. 





plan for today 2/4





new diagnosis of DVT and while on Lovenox he had 2 episodes of melena/burgundy 

colored stools, he was started on Protonix and will have serial Hand H, type and

screen and consult with GI awaiting there input.


otherwise his respiratory status is improving.


Cardiology still managing his afib and fluid overload.





plan for today 2/5





Patient had another episode of rectal bleed, his hgb remains fairly stable, his 

bleed is most likely slow but might get worse with anticoagulation which is on 

hold.


I spoke in length with his wife bout the need for an IVC filter also his 

prognosis, she will speak with him and let me know asap.


he is oxygenating better and  now is on NC.


He ramains on dopamine as per Cardiology.


K was low and will be replaced.  








plan for today 2/6





patient had 2 episodes of hypotension most likely due to overdiuresis,  

responded to IVF.His Dopamine is increased, his level of care is increased to 

[facility] status.


he has not had an blood in his stool over the past 24 h, I discussed with GI and

Cardiology, we will start heparin drip without a bolus, continue with serial H 

and H.


He is a full code still, but his wife understand that he is fragile. I have 

discussed with her the above plan.








plan for today 2/7





Early around 4 am he had an episode where he became unresponsive and hypoxic, he

needed to go on BIPAP, then he recovered and now back on high flow.He says that 

he is doing well.


He seems to be doing well on Heparin drip, no decrease in his hgb and no 

evidence of rectal bleeding.


I encouraged him to eat.

## 2021-02-08 LAB
ANION GAP SERPL CALC-SCNC: 13 MMOL/L (ref 10–20)
APTT PPP: (no result) SEC (ref 22.9–36.1)
APTT PPP: 149.8 SEC (ref 22.9–36.1)
APTT PPP: 206.2 SEC (ref 22.9–36.1)
BASOPHILS # BLD AUTO: 0 THOU/UL (ref 0–0.2)
BASOPHILS NFR BLD AUTO: 0.1 % (ref 0–1)
BUN SERPL-MCNC: 20 MG/DL (ref 8.4–25.7)
CALCIUM SERPL-MCNC: 7.3 MG/DL (ref 7.8–10.44)
CHLORIDE SERPL-SCNC: 102 MMOL/L (ref 98–107)
CO2 SERPL-SCNC: 24 MMOL/L (ref 23–31)
CREAT CL PREDICTED SERPL C-G-VRATE: 114 ML/MIN (ref 70–130)
EOSINOPHIL # BLD AUTO: 0 THOU/UL (ref 0–0.7)
EOSINOPHIL NFR BLD AUTO: 0.3 % (ref 0–10)
GLUCOSE SERPL-MCNC: 110 MG/DL (ref 83–110)
HGB BLD-MCNC: 10.5 G/DL (ref 14–18)
HGB BLD-MCNC: 10.8 G/DL (ref 14–18)
HGB BLD-MCNC: 10.9 G/DL (ref 14–18)
LYMPHOCYTES # BLD: 1.1 THOU/UL (ref 1.2–3.4)
LYMPHOCYTES NFR BLD AUTO: 13 % (ref 21–51)
MCH RBC QN AUTO: 30 PG (ref 27–31)
MCV RBC AUTO: 95.3 FL (ref 78–98)
MONOCYTES # BLD AUTO: 0.6 THOU/UL (ref 0.11–0.59)
MONOCYTES NFR BLD AUTO: 6.9 % (ref 0–10)
NEUTROPHILS # BLD AUTO: 6.5 THOU/UL (ref 1.4–6.5)
NEUTROPHILS NFR BLD AUTO: 79.6 % (ref 42–75)
PLATELET # BLD AUTO: 269 THOU/UL (ref 130–400)
PLATELET # BLD AUTO: 304 THOU/UL (ref 130–400)
POTASSIUM SERPL-SCNC: 4 MMOL/L (ref 3.5–5.1)
RBC # BLD AUTO: 3.49 MILL/UL (ref 4.7–6.1)
SODIUM SERPL-SCNC: 135 MMOL/L (ref 136–145)
WBC # BLD AUTO: 8.2 THOU/UL (ref 4.8–10.8)

## 2021-02-08 RX ADMIN — Medication SCH ML: at 08:04

## 2021-02-08 RX ADMIN — ASPIRIN 81 MG CHEWABLE TABLET SCH MG: 81 TABLET CHEWABLE at 21:11

## 2021-02-08 RX ADMIN — Medication SCH ML: at 21:12

## 2021-02-08 RX ADMIN — MEROPENEM AND SODIUM CHLORIDE SCH MLS: 1 INJECTION, SOLUTION INTRAVENOUS at 16:54

## 2021-02-08 RX ADMIN — Medication SCH MG: at 07:20

## 2021-02-08 RX ADMIN — MEROPENEM AND SODIUM CHLORIDE SCH MLS: 1 INJECTION, SOLUTION INTRAVENOUS at 09:43

## 2021-02-08 RX ADMIN — MEROPENEM AND SODIUM CHLORIDE SCH MLS: 1 INJECTION, SOLUTION INTRAVENOUS at 00:01

## 2021-02-08 NOTE — PDOC.CPN
- Subjective


Date: 02/08/21


Time: 14:15


Interval history: 





No overnight events, patient remains short of breath on hi-flow nasal cannula. 

Patient also remains on Dopamine and Heparin drip at this time.





- Review of Systems


Respiratory: reports: shortness of breath


Cardiovascular: reports: edema.  denies: chest pain, palpitation, paroxysmal 

nocturnal dyspnea, orthopnea


Gastrointestinal: denies: nausea, vomiting, diarrhea, constipation, abd pain, GI

bleeding


Musculoskeletal: denies: pain, tenderness, stiffness, swelling, 

arthritis/arthralgias


Neurological: denies: numbness, syncope, seizure, weakness





- Objective


Allergies/Adverse Reactions: 


                                    Allergies











Allergy/AdvReac Type Severity Reaction Status Date / Time


 


amitriptyline Allergy  Confusion Verified 11/05/20 06:25


 


hydrochlorothiazide Allergy  Hives Verified 11/04/20 22:58


 


valsartan Allergy  Hives Verified 11/04/20 22:58


 


ACE Inhibitors AdvReac  Hives Verified 11/04/20 22:58











Visit Medications: 


                               Current Medications





Acetaminophen (Acetaminophen 325 Mg Tab)  650 mg PO Q4H PRN


   PRN Reason: Headache/Fever/Mild Pain (1-3)


   Last Admin: 02/02/21 08:46 Dose:  650 mg


   Documented by: 


Albuterol/Ipratropium (Ipratropium/Albuterol Sulfate 3 Ml Neb)  3 ml NEB [id number]

PRN


   PRN Reason: SOB &/or Wheezing


Ascorbic Acid (Ascorbic Acid 500 Mg Chewable Tablet)  1,000 mg PO DAILY [facility]


   Last Admin: 02/08/21 07:20 Dose:  1,000 mg


   Documented by: 


Aspirin (Aspirin Chewable 81 Mg Tab)  81 mg PO HS [facility]


   Last Admin: 02/07/21 21:25 Dose:  81 mg


   Documented by: 


Bisacodyl (Bisacodyl 5 Mg Tab)  5 mg PO DAILY PRN


   PRN Reason: Constipation


Colchicine (Colchicine 0.6 Mg Tab)  0.6 mg PO BID [facility]


   Last Admin: 02/08/21 07:23 Dose:  0.6 mg


   Documented by: 


Dexamethasone (Dexamethasone 4 Mg/Ml Vial)  6 mg SLOW IVP DAILY [facility]


   Last Admin: 02/08/21 07:22 Dose:  6 mg


   Documented by: 


Guaifenesin/Dextromethorphan (Guaifenesin Dm 100-10/5 Ml Udcup)  15 ml PO Q4H 

PRN


   PRN Reason: Cough


   Last Admin: 01/29/21 11:37 Dose:  15 ml


   Documented by: 


Heparin Sodium (Porcine) (Heparin 10,000 Units/ 10 Ml Vial)  0 units SLOW IVP 

ASDIR AMANDA; Protocol


   Last Admin: 02/07/21 11:48 Dose:  6,760 units


   Documented by: 


Promethazine HCl 12.5 mg/ (Sodium Chloride)  50.5 mls @ 202 mls/hr IVPB Q6H PRN


   PRN Reason: Nausea/vomiting use second


Meropenem 1 gm/ Device  50 mls @ 100 mls/hr IVPB 0200,1000,1800 [facility]


   Last Admin: 02/08/21 09:43 Dose:  50 mls


   Documented by: 


Heparin Sodium/Dextrose (Heparin 25,000 Units/D5w)  500 mls @ 0 mls/hr IVPB INF 

[facility]; Protocol


   Last Admin: 02/07/21 22:29 Dose:  500 mls


   Documented by: 


Dopamine HCl/Dextrose (Dopamine 400 Mg/D5w 250 Ml)  250 mls @ 31.688 mls/hr IVPB

INF [facility]


   Last Admin: 02/08/21 09:41 Dose:  250 mls


   Documented by: 


Miscellaneous Medication (Electrolyte Replacement Protocol)  0 each FS ASDIR 

PRN; Protocol


   PRN Reason: ELECTROLYTE REPLACEMENT


Ondansetron HCl (Ondansetron Pf 4 Mg/2 Ml Vial)  4 mg IVP Q6H PRN


   PRN Reason: Nausea/Vomiting use 1st


Pantoprazole Sodium (Pantoprazole 40 Mg Vial)  40 mg IVP Q12HR [facility]


   Last Admin: 02/08/21 07:23 Dose:  40 mg


   Documented by: 


Phenol (Chloraseptic Spray 180 Ml Bottle)  0 ml PO Q1H PRN


   PRN Reason: SORE THROAT


Potassium Chloride (Potassium Chloride 8 Meq Tab)  16 meq PO QAM-WM [facility]


   Last Admin: 02/08/21 07:21 Dose:  16 meq


   Documented by: 


Sodium Chloride (Flush - Normal Saline 10 Ml Syringe)  10 ml IVF Q12HR [facility]


   Last Admin: 02/08/21 08:04 Dose:  10 ml


   Documented by: 


Sodium Chloride (Flush - Normal Saline 10 Ml Syringe)  10 ml IVF PRN PRN


   PRN Reason: Saline Flush


   Last Admin: 02/06/21 02:11 Dose:  10 ml


   Documented by: 


Zinc Sulfate (Zinc Sulfate 220 Mg Cap)  220 mg PO DAILY [facility]


   Last Admin: 02/08/21 07:22 Dose:  220 mg


   Documented by: 








Vital Signs & Weight: 


                                   Vital Signs











  Temp Pulse Resp BP Pulse Ox


 


 02/08/21 14:30  98.7 F  124 H  21 H  130/85  100


 


 02/08/21 12:00  99.6 F  115 H  22 H  95/61  100


 


 02/08/21 11:27      100


 


 02/08/21 10:07   108 H  28 H  141/80 H  100


 


 02/08/21 07:54      96


 


 02/08/21 07:30  97.0 F L  110 H  18  118/62  98


 


 02/08/21 06:00  97.4 F L  117 H  18  142/73 H  100








                                        











Admit Weight                   201 lb


 


Weight                         186 lb 4.65 oz

















- Quality Measures


Condition: Atrial Fibrillation/Flutter (hx or current), Heart Failure


CV meds: Beta Blocker: Yes, ACE/ARB: No (Hypotension ), ASA: Yes, Anticoagulant:

No (D/C at this time)





- Medication Contraindications


No ACE/ARB reason: Medical contraindication


No Anticoagulant reason: Medical contraindication (r/o GI bleed)





- Physical Exam


General: other (mild distress)


HEENT: mucus membranes dry


Cardiac: regular rate and rhythm, no murmur, S1/S2


Lungs: decreased breath sounds, oxygen


Neuro: numbness, other (numbness to BLE)


Abdomen: non-tender, ascites


Extremities: 1+ LE edema, other: (decreased pulses to BLE, multiple toes to 

bilateral feet discolored)


Skin: clear


Musculoskeletal: other (no movement to BLE)





- Labs


Result Diagrams: 


                                 02/08/21 16:50





                                 02/08/21 01:23


                                  Troponin/CKMB











Troponin I  0.051 ng/mL (< 0.028)  H  02/07/21  05:15    














- EKG Interpretation


EKG Method: Telemetry ('s)





- Assessment/Plan


Assessment/Plan: 





1. Atrial fibrillation: He remains MAT, he did have one small episode of A-fib 

over the weekend when he became unresponsive for a short amount of time. His 

Coreg has been on hold at this time d/t hypotension.  


2. COVID-19 Pneumonia: on hi flow O2, treated by primary care services


3. Cardiomyopathy: EF 40-45% (August 2020) his dopamine drip was stopped over 

the weekend but had to be re-started d/t hypotension. he remains on the Dopamine

drip at this time, his BP has remained in the 's Systolic, will consider 

decreasing dopamine when his BP stabilizes. 


4. Metastatic Prostate Cancer


5. UTI with gram negative rods: treated by primary care services


6. Bilateral lower extremity edema: His BLE edema is much improved over the 

weekend, he was able to diureis over 400 mL of fluid in the past 24 hours 


7. Bilateral DVT: Venous duplex yesterday revealed bilateral nonocclusive 

thrombus involving the deep venous system of each lower extremity, not a 

candidate for an IVC filter at this time, he is on the Heparin drip at this 

time. 


8. Blood in stool: no more blood in stool over the weekend. 








Pt. seen and eval. by me. I agree with the A/P by the NP. He refused to have the

IVC filter placed after we discussed it on Friday. The edema is better. The toes

are a little cyanotic but the lower legs are warmer than last week. chest clear.

RRR with occ. ectopy.  continue dopamine for pressure support. Try to wean. gjm

## 2021-02-08 NOTE — ULT
Arterial duplex sonogram bilateral lower extremity



HISTORY: Leg pain. Loss of pulses. Vascular disease.



Good color and spectral Doppler flow within the arterial structures of each lower extremity. Triphasi
c waveforms within the common femoral and deep femoral, femoral, popliteal, and posterior tibial

arteries. Anterior tibial arteries show good flow. No abnormally elevated peak systolic velocities. D
orsalis pedis arteries obscured by overlying edema.



While some venous flow is still detected within each lower extremity, internal thrombus with incomple
te compressibility involve each common femoral, femoral, and deep femoral vein.



  



IMPRESSION :

Good arterial flow to each lower extremity without evidence of significant stenosis.



Nonocclusive thrombus involving the deep venous system of each lower extremity.







Findings were called to Dr. Manning at 0734 hours.

Code CR.



Transcribed Date/Time: 2/8/2021 2:41 PM



Reported By: WALTER Contreras 

Electronically Signed:  2/10/2021 7:42 AM

## 2021-02-08 NOTE — PDOC.HOSPP
- Subjective


Encounter Date: 02/08/21


Subjective: 


Reports that he is feeling well





- Objective


Vital Signs & Weight: 


                             Vital Signs (12 hours)











  Temp Pulse Resp BP Pulse Ox


 


 02/08/21 18:41   129 H  21 H  126/67  93 L


 


 02/08/21 17:00      98


 


 02/08/21 16:30   129 H  20  114/75  95


 


 02/08/21 14:30  98.7 F  124 H  21 H  130/85  100


 


 02/08/21 12:00  99.6 F  115 H  22 H  95/61  100


 


 02/08/21 11:27      100


 


 02/08/21 10:07   108 H  28 H  141/80 H  100


 


 02/08/21 07:54      96








                                     Weight











Admit Weight                   201 lb


 


Weight                         186 lb 4.65 oz














I&O: 


                                        











 02/07/21 02/08/21 02/09/21





 06:59 06:59 06:59


 


Intake Total 278 897 450


 


Output Total 750 890 800


 


Balance -472 7 -350











Result Diagrams: 


                                 02/08/21 16:50





                                 02/08/21 01:23


Additional Labs: 


                                   Accuchecks











  02/08/21





  00:29


 


POC Glucose  111 H














Hospitalist ROS





- Medication


Medications: 


Active Medications











Generic Name Dose Route Start Last Admin





  Trade Name Freq  PRN Reason Stop Dose Admin


 


Acetaminophen  650 mg  01/29/21 03:46  02/02/21 08:46





  Acetaminophen 325 Mg Tab  PO   650 mg





  Q4H PRN   Administration





  Headache/Fever/Mild Pain (1-3)  


 


Ascorbic Acid  1,000 mg  01/29/21 09:00  02/08/21 07:20





  Ascorbic Acid 500 Mg Chewable Tablet  PO   1,000 mg





  DAILY AMANDA   Administration


 


Aspirin  81 mg  01/29/21 21:00  02/07/21 21:25





  Aspirin Chewable 81 Mg Tab  PO   81 mg





  HS AMANDA   Administration


 


Colchicine  0.6 mg  01/29/21 09:00  02/08/21 07:23





  Colchicine 0.6 Mg Tab  PO   0.6 mg





  BID AMANDA   Administration


 


Dexamethasone  6 mg  01/29/21 09:00  02/08/21 07:22





  Dexamethasone 4 Mg/Ml Vial  SLOW IVP   6 mg





  DAILY AMANDA   Administration


 


Guaifenesin/Dextromethorphan  15 ml  01/29/21 03:46  01/29/21 11:37





  Guaifenesin Dm 100-10/5 Ml Udcup  PO   15 ml





  Q4H PRN   Administration





  Cough  


 


Heparin Sodium (Porcine)  0 units  02/06/21 15:30  02/07/21 11:48





  Heparin 10,000 Units/ 10 Ml Vial  SLOW IVP   6,760 units





  ASDIR AMANDA   Administration





  Protocol  


 


Meropenem 1 gm/ Device  50 mls @ 100 mls/hr  02/05/21 18:00  02/08/21 16:54





  IVPB   50 mls





  0200,1000,1800 AMANDA   Administration


 


Heparin Sodium/Dextrose  500 mls @ 0 mls/hr  02/06/21 15:30  02/07/21 22:29





  Heparin 25,000 Units/D5w  IVPB   500 mls





  INF AMANDA   Administration





  Protocol  





  Per Protocol  


 


Dopamine HCl/Dextrose  250 mls @ 31.688 mls/hr  02/08/21 03:15  02/08/21 18:30





  Dopamine 400 Mg/D5w 250 Ml  IVPB   250 mls





  INF AMNADA   Administration





  10 MCG/KG/MIN  


 


Pantoprazole Sodium  40 mg  02/04/21 21:00  02/08/21 07:23





  Pantoprazole 40 Mg Vial  IVP   40 mg





  Q12HR AMANDA   Administration


 


Phenol  0 ml  02/08/21 15:46  02/08/21 16:22





  Chloraseptic Spray 180 Ml Bottle  PO   2 spr





  Q1H PRN   Administration





  SORE THROAT  


 


Potassium Chloride  16 meq  02/04/21 08:00  02/08/21 07:21





  Potassium Chloride 8 Meq Tab  PO   16 meq





  QAM-WM AMANDA   Administration


 


Sodium Chloride  10 ml  02/01/21 21:00  02/08/21 08:04





  Flush - Normal Saline 10 Ml Syringe  IVF   10 ml





  Q12HR AMANDA   Administration


 


Sodium Chloride  10 ml  02/01/21 13:30  02/06/21 02:11





  Flush - Normal Saline 10 Ml Syringe  IVF   10 ml





  PRN PRN   Administration





  Saline Flush  


 


Zinc Sulfate  220 mg  01/29/21 09:00  02/08/21 07:22





  Zinc Sulfate 220 Mg Cap  PO   220 mg





  DAILY AMANDA   Administration














Hospitalist Exam


Vitals: 


                             Vital Signs (12 hours)











  Temp Pulse Resp BP Pulse Ox


 


 02/08/21 18:41   129 H  21 H  126/67  93 L


 


 02/08/21 17:00      98


 


 02/08/21 16:30   129 H  20  114/75  95


 


 02/08/21 14:30  98.7 F  124 H  21 H  130/85  100


 


 02/08/21 12:00  99.6 F  115 H  22 H  95/61  100


 


 02/08/21 11:27      100


 


 02/08/21 10:07   108 H  28 H  141/80 H  100


 


 02/08/21 07:54      96








                                     Weight











Admit Weight                   201 lb


 


Weight                         186 lb 4.65 oz














General Appearance: NAD


Eye: PERRL, anicteric sclera


ENT: normocephalic atraumatic


Neck: supple, symmetric


Heart: RRR, no murmur


Respiratory: CTAB, no wheezes


Gastrointestinal: soft, non-tender





Hosp A/P


(1) Atrial fibrillation with rapid ventricular response


Code(s): I48.91 - UNSPECIFIED ATRIAL FIBRILLATION   Status: Acute   





(2) COVID-19 virus infection


Code(s): U07.1 - COVID-19   Status: Acute   





(3) Urinary tract infection


Status: Acute   





(4) Leg edema


Code(s): R60.0 - LOCALIZED EDEMA   Status: Acute   





(5) CHF (congestive heart failure)


Code(s): I50.9 - HEART FAILURE, UNSPECIFIED   Status: Chronic   





(6) DVT (deep venous thrombosis)


Code(s): I82.409 - ACUTE EMBOLISM AND THOMBOS UNSP DEEP VN UNSP LOWER EXTREMITY 

 Status: Acute   





(7) GI bleed


Code(s): K92.2 - GASTROINTESTINAL HEMORRHAGE, UNSPECIFIED   Status: Acute   





- Plan





plan for today 2/3








Hypoxia  on high flow nasal cannula, try to wean.





Continue dexamethasone and enoxaparin.


The patient completed a course of remdesivir.


Negative fluid balance with past 24 hours with diuresis.


I would continue Bumex.


Patient is on dopamine per cardiology.


He remains in sinus rhythm.  Continue Coreg and Lovenox.BP on the low side, hold

PM coreg dose.


Continue meropenem for Pseudomonas UTI.


legs are cold, check pulses with doppler. 





plan for today 2/4





new diagnosis of DVT and while on Lovenox he had 2 episodes of melena/burgundy 

colored stools, he was started on Protonix and will have serial Hand H, type and

screen and consult with GI awaiting there input.


otherwise his respiratory status is improving.


Cardiology still managing his afib and fluid overload.





plan for today 2/5





Patient had another episode of rectal bleed, his hgb remains fairly stable, his 

bleed is most likely slow but might get worse with anticoagulation which is on 

hold.


I spoke in length with his wife bout the need for an IVC filter also his progn

osis, she will speak with him and let me know asap.


he is oxygenating better and  now is on NC.


He ramains on dopamine as per Cardiology.


K was low and will be replaced.  








plan for today 2/6





patient had 2 episodes of hypotension most likely due to overdiuresis,  

responded to IVF.His Dopamine is increased, his level of care is increased to 

[facility] status.


he has not had an blood in his stool over the past 24 h, I discussed with GI and

Cardiology, we will start heparin drip without a bolus, continue with serial H 

and H.


He is a full code still, but his wife understand that he is fragile. I have 

discussed with her the above plan.








plan for today 2/7





Early around 4 am he had an episode where he became unresponsive and hypoxic, he

needed to go on BIPAP, then he recovered and now back on high flow.He says that 

he is doing well.


He seems to be doing well on Heparin drip, no decrease in his hgb and no 

evidence of rectal bleeding.


I encouraged him to eat.








Plan for today 2/8





Patient continues to have episodes where he becomes unresponsive and his pulse 

ox drops but then he recovers spontaneously.  He should be done with his 10-day 

of Decadron but considering that he is not improving much I will continue with 

Decadron for now


Dietary recommended tube feeds but we cannot do that because he is still on high

flow.  We will start him on PPN.


His hemoglobin has been stable I will stop hemoglobin twice daily.


He is doing well on heparin drip I will consider transitioning him to Eliquis a

nd stop heparin.

## 2021-02-09 LAB
ANION GAP SERPL CALC-SCNC: 17 MMOL/L (ref 10–20)
APTT PPP: 146 SEC (ref 22.9–36.1)
BASOPHILS # BLD AUTO: 0 THOU/UL (ref 0–0.2)
BASOPHILS NFR BLD AUTO: 0.2 % (ref 0–1)
BUN SERPL-MCNC: 19 MG/DL (ref 8.4–25.7)
CALCIUM SERPL-MCNC: 7.7 MG/DL (ref 7.8–10.44)
CHLORIDE SERPL-SCNC: 101 MMOL/L (ref 98–107)
CO2 SERPL-SCNC: 23 MMOL/L (ref 23–31)
CREAT CL PREDICTED SERPL C-G-VRATE: 98 ML/MIN (ref 70–130)
EOSINOPHIL # BLD AUTO: 0 THOU/UL (ref 0–0.7)
EOSINOPHIL NFR BLD AUTO: 0.3 % (ref 0–10)
GLUCOSE SERPL-MCNC: 119 MG/DL (ref 83–110)
HGB BLD-MCNC: 11.3 G/DL (ref 14–18)
LYMPHOCYTES # BLD: 1.4 THOU/UL (ref 1.2–3.4)
LYMPHOCYTES NFR BLD AUTO: 15.3 % (ref 21–51)
MCH RBC QN AUTO: 30.2 PG (ref 27–31)
MCV RBC AUTO: 95.8 FL (ref 78–98)
MONOCYTES # BLD AUTO: 0.7 THOU/UL (ref 0.11–0.59)
MONOCYTES NFR BLD AUTO: 8 % (ref 0–10)
NEUTROPHILS # BLD AUTO: 6.9 THOU/UL (ref 1.4–6.5)
NEUTROPHILS NFR BLD AUTO: 76.1 % (ref 42–75)
PLATELET # BLD AUTO: 309 THOU/UL (ref 130–400)
POTASSIUM SERPL-SCNC: 3.9 MMOL/L (ref 3.5–5.1)
RBC # BLD AUTO: 3.74 MILL/UL (ref 4.7–6.1)
SODIUM SERPL-SCNC: 137 MMOL/L (ref 136–145)
WBC # BLD AUTO: 9.1 THOU/UL (ref 4.8–10.8)

## 2021-02-09 RX ADMIN — MEROPENEM AND SODIUM CHLORIDE SCH MLS: 1 INJECTION, SOLUTION INTRAVENOUS at 01:39

## 2021-02-09 RX ADMIN — Medication SCH ML: at 09:57

## 2021-02-09 RX ADMIN — MEROPENEM AND SODIUM CHLORIDE SCH MLS: 1 INJECTION, SOLUTION INTRAVENOUS at 10:03

## 2021-02-09 RX ADMIN — Medication SCH: at 09:59

## 2021-02-09 RX ADMIN — ASPIRIN 81 MG CHEWABLE TABLET SCH MG: 81 TABLET CHEWABLE at 21:09

## 2021-02-09 RX ADMIN — Medication SCH ML: at 21:10

## 2021-02-09 RX ADMIN — MEROPENEM AND SODIUM CHLORIDE SCH MLS: 1 INJECTION, SOLUTION INTRAVENOUS at 18:14

## 2021-02-09 NOTE — PDOC.EVN
Event Note





- Event Note


Event Note: 





Called by hospitalist group to place CVC in patient for dopamine and TPN 

administration. has peripheral and midline functional currently. Spoke with 

patient's wife who provided consent. however patient is decisional at this time 

and after i explained the procedure to him he refused stating it was late and he

would like to think it over. I recommend an attempt and weaning his dopamine as 

his BP at the time of my exam was 120s/70s. if he is to require TPN, a PICC line

would be preferred over a CVC for longer term use. Additional, I do not feel the

patient would cooperate with the procedure at this time. This was relayed to the

patient's nurse, the ICU charge nurse, and the on call hospitalist.

## 2021-02-09 NOTE — PDOC.CPN
- Subjective


Date: 02/09/21


Time: 11:40


Interval history: 





RN & RT at  bedside, patient's bi-pap mask was off of his face and his oxygen 

saturation dropped into the 70's, he was unresponsive for a short period but was

able to become responsive to deep stimulation. Patient now awake and alert and 

answering questions appropriately. He denies any chest pain. His oxygen 

saturation quickly increased to 100% on bi-pap. His pulse remained in the 100's.







- Review of Systems


General: denies: fever/chills, weight/appetite/sleep changes, night sweats, 

fatigue


Respiratory: reports: shortness of breath


Cardiovascular: denies: chest pain, palpitation, edema, paroxysmal nocturnal 

dyspnea, orthopnea


Gastrointestinal: denies: nausea, vomiting, diarrhea, constipation, abd pain, GI

bleeding


Musculoskeletal: denies: pain, tenderness, stiffness, swelling, arthritis/a

rthralgias


Neurological: reports: numbness





- Objective


Allergies/Adverse Reactions: 


                                    Allergies











Allergy/AdvReac Type Severity Reaction Status Date / Time


 


amitriptyline Allergy  Confusion Verified 11/05/20 06:25


 


hydrochlorothiazide Allergy  Hives Verified 11/04/20 22:58


 


valsartan Allergy  Hives Verified 11/04/20 22:58


 


ACE Inhibitors AdvReac  Hives Verified 11/04/20 22:58











Visit Medications: 


                               Current Medications





Acetaminophen (Acetaminophen 325 Mg Tab)  650 mg PO Q4H PRN


   PRN Reason: Headache/Fever/Mild Pain (1-3)


   Last Admin: 02/02/21 08:46 Dose:  650 mg


   Documented by: 


Albuterol/Ipratropium (Ipratropium/Albuterol Sulfate 3 Ml Neb)  3 ml NEB [id number]

PRN


   PRN Reason: SOB &/or Wheezing


Ascorbic Acid (Ascorbic Acid 500 Mg Chewable Tablet)  1,000 mg PO DAILY [facility]


   Last Admin: 02/09/21 09:59 Dose:  Not Given


   Documented by: 


Aspirin (Aspirin Chewable 81 Mg Tab)  81 mg PO HS [facility]


   Last Admin: 02/08/21 21:11 Dose:  81 mg


   Documented by: 


Bisacodyl (Bisacodyl 5 Mg Tab)  5 mg PO DAILY PRN


   PRN Reason: Constipation


Colchicine (Colchicine 0.6 Mg Tab)  0.6 mg PO BID [facility]


   Last Admin: 02/09/21 09:58 Dose:  Not Given


   Documented by: 


Dexamethasone (Dexamethasone 4 Mg/Ml Vial)  6 mg SLOW IVP BID AMANDA


Guaifenesin/Dextromethorphan (Guaifenesin Dm 100-10/5 Ml Udcup)  15 ml PO Q4H 

PRN


   PRN Reason: Cough


   Last Admin: 01/29/21 11:37 Dose:  15 ml


   Documented by: 


Heparin Sodium (Porcine) (Heparin 10,000 Units/ 10 Ml Vial)  0 units SLOW IVP 

ASDIR AMANDA; Protocol


   Last Admin: 02/07/21 11:48 Dose:  6,760 units


   Documented by: 


Promethazine HCl 12.5 mg/ (Sodium Chloride)  50.5 mls @ 202 mls/hr IVPB Q6H PRN


   PRN Reason: Nausea/vomiting use second


Meropenem 1 gm/ Device  50 mls @ 100 mls/hr IVPB 0200,1000,1800 AMANDA


   Last Admin: 02/09/21 10:03 Dose:  50 mls


   Documented by: 


Heparin Sodium/Dextrose (Heparin 25,000 Units/D5w)  500 mls @ 0 mls/hr IVPB INF 

AMANDA; Protocol


   Last Admin: 02/09/21 01:44 Dose:  500 mls


   Documented by: 


Dopamine HCl/Dextrose (Dopamine 400 Mg/D5w 250 Ml)  250 mls @ 31.688 mls/hr IVPB

INF AMANDA


   Last Admin: 02/09/21 10:53 Dose:  250 mls


   Documented by: 


Dextrose/Water/ Sterile Water/ (Amino Acids)  965.29 mls @ 41.669 mls/hr IV INF 

AMANDA


Doxycycline Hyclate 100 mg/ (Sodium Chloride)  100 mls @ 100 mls/hr IVPB Q12HR 

AMANDA


   Stop: 02/19/21 21:01


Miscellaneous Medication (Electrolyte Replacement Protocol)  0 each FS ASDIR 

PRN; Protocol


   PRN Reason: ELECTROLYTE REPLACEMENT


Mometasone Furoate/Formoterol Fumar (Mometasone 200 Mcg/Formoterol 5 Mcg 120 

Puff Inhaler)  2 puff INH BID-RT AMANDA


Ondansetron HCl (Ondansetron Pf 4 Mg/2 Ml Vial)  4 mg IVP Q6H PRN


   PRN Reason: Nausea/Vomiting use 1st


Pantoprazole Sodium (Pantoprazole 40 Mg Vial)  40 mg IVP Q12HR [facility]


   Last Admin: 02/09/21 09:56 Dose:  40 mg


   Documented by: 


Phenol (Chloraseptic Spray 180 Ml Bottle)  0 ml PO Q1H PRN


   PRN Reason: SORE THROAT


   Last Admin: 02/08/21 16:22 Dose:  2 spr


   Documented by: 


Potassium Chloride (Potassium Chloride 8 Meq Tab)  16 meq PO QAM-WM [facility]


   Last Admin: 02/09/21 09:59 Dose:  Not Given


   Documented by: 


Sodium Chloride (Flush - Normal Saline 10 Ml Syringe)  10 ml IVF Q12HR AMANDA


   Last Admin: 02/09/21 09:57 Dose:  10 ml


   Documented by: 


Sodium Chloride (Flush - Normal Saline 10 Ml Syringe)  10 ml IVF PRN PRN


   PRN Reason: Saline Flush


   Last Admin: 02/06/21 02:11 Dose:  10 ml


   Documented by: 


Zinc Sulfate (Zinc Sulfate 220 Mg Cap)  220 mg PO DAILY [facility]


   Last Admin: 02/09/21 10:00 Dose:  Not Given


   Documented by: 








Vital Signs & Weight: 


                                   Vital Signs











  Temp Pulse Resp BP Pulse Ox


 


 02/09/21 11:49      97


 


 02/09/21 08:15  98.5 F  94  20  142/73 H  99


 


 02/09/21 07:30      92 L


 


 02/09/21 06:00  98.5 F  100  25 H  82/50 L  94 L


 


 02/09/21 03:53  98.5 F  60  24 H  92/57 L  93 L


 


 02/09/21 03:10      91 L








                                        











Admit Weight                   201 lb


 


Weight                         186 lb 4.65 oz

















- Quality Measures


Condition: Atrial Fibrillation/Flutter (hx or current), Heart Failure


CV meds: Beta Blocker: No (on hold d/t hypotension ), ACE/ARB: No (Hypotension 

), ASA: Yes, Anticoagulant: Yes (Heparin drip )





- Medication Contraindications


No ACE/ARB reason: Medical contraindication


No Anticoagulant reason: Medical contraindication (r/o GI bleed)





- Physical Exam


General: other (mild distress)


HEENT: mucus membranes dry


Cardiac: no murmur, tachycardia


Lungs: decreased breath sounds, oxygen, other (Bi-pap)


Neuro: numbness, other (BLE numbness)


Abdomen: active bowel sounds, soft, distended


Extremities: no clubbing, other: (decreased pulses, cyanotic toes, edema 

resolving to BLE)


Skin: cool





- Labs


Result Diagrams: 


                                 02/09/21 04:57





                                 02/09/21 04:57


                                  Troponin/CKMB











Troponin I  0.051 ng/mL (< 0.028)  H  02/07/21  05:15    














- EKG Interpretation


EKG Method: Telemetry (Patient had about a 10-20 minute of Atrial fibrillation/ 

flutter today at 0900 per telemetry records, HR in the 100's.  During 

examination, patient remains in multifocal atrial tachycardia -120's)





- Assessment/Plan


Assessment/Plan: 





1. Atrial fibrillation: He is a CHADSVASC score of a 6. He remains MAT -

120's today, he did have one small episode of A-fib this morning for about 10 

minutes. His Coreg has been on hold at this time d/t hypotension.  


2. COVID-19 Pneumonia: on bi-pap, treated by primary care services


3. Cardiomyopathy: EF 40-45% (August 2020) his dopamine drip was stopped over 

the weekend but had to be re-started d/t hypotension. he remains on the Dopamine

drip at this time, will consider decreasing dopamine when his BP stabilizes. His

BP have been fluctuating, this morning, his BP was 82/50, during exam, his BP 

was 132/93, will continue to monitor, if his BP remains stable, will try and 

wean Dopamine. 


4. Metastatic Prostate Cancer


5. UTI with gram negative rods: treated by primary care services


6. Bilateral lower extremity edema: His BLE edema is much improved over the 

weekend, he was able to diureis over 400 mL of fluid in the past 24 hours 


7. Bilateral DVT: Venous duplex revealed bilateral non-occlusive thrombus 

involving the deep venous system of each lower extremity, he has refused an IVC 

filter, he remains on the Heparin drip at this time. His Hemoglobin has remained

stable, it was 11.3 today. 


8. Blood in stool: no more blood in stool





We will continue to follow the patient and continue symptomatic treatment at 

this time.

## 2021-02-09 NOTE — RAD
PORTABLE CHEST:

2/9/21

 

HISTORY: 

Shortness of breath. Viral pneumonia. 

 

COMPARISON: 

2/7/21. 

 

FINDINGS/IMPRESSION:  

Cardiomegaly with vascular congestion. Bibasilar infiltrates and bilateral effusions. Basilar finding
s appear more pronounced than on the prior study. 

 

POS: AGW

## 2021-02-09 NOTE — PDOC.HOSPP
- Subjective


Encounter Date: 02/09/21


Subjective: 


Continues to state that he is doing good but I am not sure if he has in denial 

or confused.





- Objective


Vital Signs & Weight: 


                             Vital Signs (12 hours)











  Temp Pulse Resp BP Pulse Ox


 


 02/09/21 16:50  98.2 F  127 H  20  156/96 H  100


 


 02/09/21 16:40      100


 


 02/09/21 16:00  97.4 F L  111 H   160/90 H  100


 


 02/09/21 15:00   81   91/54 L  98


 


 02/09/21 14:00   82   121/77  97


 


 02/09/21 13:00   85   121/67  99


 


 02/09/21 12:00   65   143/62 H  97


 


 02/09/21 11:49      97


 


 02/09/21 11:24  98.2 F  47 L  17  132/93 H  100


 


 02/09/21 10:00   88   132/93 H  95


 


 02/09/21 09:00   96   118/82  99


 


 02/09/21 08:15  98.5 F  94  20  142/73 H  99


 


 02/09/21 07:30      92 L


 


 02/09/21 06:00  98.5 F  100  25 H  82/50 L  94 L








                                     Weight











Admit Weight                   201 lb


 


Weight                         186 lb 4.65 oz














I&O: 


                                        











 02/08/21 02/09/21 02/10/21





 06:59 06:59 06:59


 


Intake Total 897 690 


 


Output Total 890 1200 


 


Balance 7 -510 











Result Diagrams: 


                                 02/09/21 04:57





                                 02/09/21 04:57





Hospitalist ROS





- Medication


Medications: 


Active Medications











Generic Name Dose Route Start Last Admin





  Trade Name Merline  PRN Reason Stop Dose Admin


 


Acetaminophen  650 mg  01/29/21 03:46  02/02/21 08:46





  Acetaminophen 325 Mg Tab  PO   650 mg





  Q4H PRN   Administration





  Headache/Fever/Mild Pain (1-3)  


 


Ascorbic Acid  1,000 mg  01/29/21 09:00  02/09/21 09:59





  Ascorbic Acid 500 Mg Chewable Tablet  PO   Not Given





  DAILY AMANDA  


 


Aspirin  81 mg  01/29/21 21:00  02/08/21 21:11





  Aspirin Chewable 81 Mg Tab  PO   81 mg





  HS AMANDA   Administration


 


Colchicine  0.6 mg  01/29/21 09:00  02/09/21 09:58





  Colchicine 0.6 Mg Tab  PO   Not Given





  BID AMANDA  


 


Guaifenesin/Dextromethorphan  15 ml  01/29/21 03:46  01/29/21 11:37





  Guaifenesin Dm 100-10/5 Ml Udcup  PO   15 ml





  Q4H PRN   Administration





  Cough  


 


Meropenem 1 gm/ Device  50 mls @ 100 mls/hr  02/05/21 18:00  02/09/21 10:03





  IVPB   50 mls





  0200,1000,1800 AMANDA   Administration


 


Dopamine HCl/Dextrose  250 mls @ 31.688 mls/hr  02/08/21 03:15  02/09/21 10:53





  Dopamine 400 Mg/D5w 250 Ml  IVPB   250 mls





  INF AMANDA   Administration





  10 MCG/KG/MIN  


 


Pantoprazole Sodium  40 mg  02/04/21 21:00  02/09/21 09:56





  Pantoprazole 40 Mg Vial  IVP   40 mg





  Q12HR AMANDA   Administration


 


Phenol  0 ml  02/08/21 15:46  02/08/21 16:22





  Chloraseptic Spray 180 Ml Bottle  PO   2 spr





  Q1H PRN   Administration





  SORE THROAT  


 


Potassium Chloride  16 meq  02/04/21 08:00  02/09/21 09:59





  Potassium Chloride 8 Meq Tab  PO   Not Given





  QAM-WM AMANDA  


 


Sodium Chloride  10 ml  02/01/21 21:00  02/09/21 09:57





  Flush - Normal Saline 10 Ml Syringe  IVF   10 ml





  Q12HR AMANDA   Administration


 


Sodium Chloride  10 ml  02/01/21 13:30  02/06/21 02:11





  Flush - Normal Saline 10 Ml Syringe  IVF   10 ml





  PRN PRN   Administration





  Saline Flush  


 


Zinc Sulfate  220 mg  01/29/21 09:00  02/09/21 10:00





  Zinc Sulfate 220 Mg Cap  PO   Not Given





  DAILY AMANDA  














Hospitalist Exam


Vitals: 


                             Vital Signs (12 hours)











  Temp Pulse Resp BP Pulse Ox


 


 02/09/21 16:50  98.2 F  127 H  20  156/96 H  100


 


 02/09/21 16:40      100


 


 02/09/21 16:00  97.4 F L  111 H   160/90 H  100


 


 02/09/21 15:00   81   91/54 L  98


 


 02/09/21 14:00   82   121/77  97


 


 02/09/21 13:00   85   121/67  99


 


 02/09/21 12:00   65   143/62 H  97


 


 02/09/21 11:49      97


 


 02/09/21 11:24  98.2 F  47 L  17  132/93 H  100


 


 02/09/21 10:00   88   132/93 H  95


 


 02/09/21 09:00   96   118/82  99


 


 02/09/21 08:15  98.5 F  94  20  142/73 H  99


 


 02/09/21 07:30      92 L


 


 02/09/21 06:00  98.5 F  100  25 H  82/50 L  94 L








                                     Weight











Admit Weight                   201 lb


 


Weight                         186 lb 4.65 oz














General Appearance: NAD, awake alert, ill appearing


Eye: PERRL, anicteric sclera


ENT: normocephalic atraumatic, no oropharyngeal lesions


Neck: supple, symmetric, no JVD, no thyromegaly


Heart: RRR, no murmur, no gallops


Respiratory: rales


Gastrointestinal: soft, non-tender, non-distended


Extremities: no cyanosis, no clubbing


Skin: normal turgor, no lesions


Neurological: cranial nerve grossly intact





Hosp A/P


(1) Atrial fibrillation with rapid ventricular response


Code(s): I48.91 - UNSPECIFIED ATRIAL FIBRILLATION   Status: Acute   





(2) COVID-19 virus infection


Code(s): U07.1 - COVID-19   Status: Acute   





(3) Urinary tract infection


Status: Acute   





(4) Leg edema


Code(s): R60.0 - LOCALIZED EDEMA   Status: Acute   





(5) CHF (congestive heart failure)


Code(s): I50.9 - HEART FAILURE, UNSPECIFIED   Status: Chronic   





(6) DVT (deep venous thrombosis)


Code(s): I82.409 - ACUTE EMBOLISM AND THOMBOS UNSP DEEP VN UNSP LOWER EXTREMITY 

 Status: Acute   





(7) GI bleed


Code(s): K92.2 - GASTROINTESTINAL HEMORRHAGE, UNSPECIFIED   Status: Acute   





- Plan





plan for today 2/3








Hypoxia  on high flow nasal cannula, try to wean.





Continue dexamethasone and enoxaparin.


The patient completed a course of remdesivir.


Negative fluid balance with past 24 hours with diuresis.


I would continue Bumex.


Patient is on dopamine per cardiology.


He remains in sinus rhythm.  Continue Coreg and Lovenox.BP on the low side, hold

PM coreg dose.


Continue meropenem for Pseudomonas UTI.


legs are cold, check pulses with doppler. 





plan for today 2/4





new diagnosis of DVT and while on Lovenox he had 2 episodes of melena/burgundy 

colored stools, he was started on Protonix and will have serial Hand H, type and

screen and consult with GI awaiting there input.


otherwise his respiratory status is improving.


Cardiology still managing his afib and fluid overload.





plan for today 2/5





Patient had another episode of rectal bleed, his hgb remains fairly stable, his 

bleed is most likely slow but might get worse with anticoagulation which is on 

hold.


I spoke in length with his wife bout the need for an IVC filter also his 

prognosis, she will speak with him and let me know asap.


he is oxygenating better and  now is on NC.


He ramains on dopamine as per Cardiology.


K was low and will be replaced.  








plan for today 2/6





patient had 2 episodes of hypotension most likely due to overdiuresis,  

responded to IVF.His Dopamine is increased, his level of care is increased to 

[facility] status.


he has not had an blood in his stool over the past 24 h, I discussed with GI and

Cardiology, we will start heparin drip without a bolus, continue with serial H 

and H.


He is a full code still, but his wife understand that he is fragile. I have 

discussed with her the above plan.








plan for today 2/7





Early around 4 am he had an episode where he became unresponsive and hypoxic, he

needed to go on BIPAP, then he recovered and now back on high flow.He says that 

he is doing well.


He seems to be doing well on Heparin drip, no decrease in his hgb and no 

evidence of rectal bleeding.


I encouraged him to eat.








Plan for today 2/8





Patient continues to have episodes where he becomes unresponsive and his pulse 

ox drops but then he recovers spontaneously.  He should be done with his 10-day 

of Decadron but considering that he is not improving much I will continue with 

Decadron for now


Dietary recommended tube feeds but we cannot do that because he is still on high

flow.  We will start him on PPN.


His hemoglobin has been stable I will stop hemoglobin twice daily.


He is doing well on heparin drip I will consider transitioning him to Eliquis 

and stop heparin.








Plan for today 2/9





Patient continues to state that he is doing good when in fact he is not making 

any progress and possibly deteriorating, he is not have much caloric intake and 

he is on PPN.


His left arm is so swollen the phlebotomist is unable to obtain serial PTTs, I 

plan to stop his heparin drip and start him on Eliquis 5 mg p.o. twice daily, 

since his hemoglobin has been stable on heparin drip he should be okay to be 

transitioned to oral anticoagulation.





Pulmonary consult appreciated his IV Decadron was increased.





Cardiology continues to manage his A. fib and diuresis.  Patient continues to 

refuse any intervention such as a central line to have the PPN administered or 

even an IVC filter to be placed.





Palliative care did attempt to talk to his wife again about his prognosis, wife 

is unable to make a decision to make him DNR, I believe that she will never 

reach the state of making a decision, and I do not think he is fully 

comprehending his situation either because he is confused or is in denial, I 

asked prior to nurse to initiate an ethics committee consultation.

## 2021-02-09 NOTE — CON
DATE OF CONSULTATION:  



HISTORY OF PRESENT ILLNESS:  Radny Link is a 80-year-old gentleman who has been

in the hospital for 11 days, apparently developed some kind of hypoxemia yesterday,

and Pulmonary was consulted. 



He is presently on BiPAP and high-flow alternating. 



Initial admission on 01/28, presented with shortness of breath, found to have corona

positive status.  He had a cough. 



His x-ray shows a left-sided infiltrate.



PAST MEDICAL HISTORY:  

1. Coronary artery disease.

2. Cardiac arrhythmias.

3. Weakness.

4. Abnormal gait.

5. Hyperlipidemia.

6. Hypertension.

7. Renal stones.

8. Prostate cancer with mets.

9. Bipolar disorder.



PAST SURGICAL HISTORY:  Including appendix.



SOCIAL HISTORY:  No alcohol or tobacco abuse.



REVIEW OF SYSTEMS:  Apparently had a GI bleed at one some time.



HOME MEDICATIONS:  Include:

1. Coreg 3.125.

2. Albuterol inhaler.

3. Hydrocodone.

4. Lasix 40.

5. B12.

6. Flomax 0.4.



ALLERGIES:  AMITRIPTYLINE, HYDROCHLOROTHIAZIDE, ACE, AND VALSARTAN.



PHYSICAL EXAMINATION:

VITAL SIGNS:  This morning; temperature 98, pulse 94, respirations 20, and sats 90%

on BiPAP and 100% FiO2. 

CHEST:  Decreased breath sounds.  No wheezing. 

CARDIAC:  Normal S1 and S2.  No gallops. 

ABDOMEN:  No masses.



LABORATORY STUDIES:  White count 9000, hemoglobin and hematocrit 11 and 33, and

platelet count 309.  Renal function normal. 



ASSESSMENT:  

1. Status post corona positive pneumonia.

2. Respiratory failure.

3. Supraventricular tachycardia.

4. No evidence of deep vein thrombosis.

5. Recent gastrointestinal bleed apparently.



PLAN:  I will just increase his steroids.  Otherwise, not much else to add at this

stage.  Consider palliative care.  Supportive care and PT. 



TIME SPENT:  70 minutes, 50% in direct patient care.







Job ID:  [id number]

## 2021-02-10 LAB
ANION GAP SERPL CALC-SCNC: 17 MMOL/L (ref 10–20)
BASOPHILS # BLD AUTO: 0 THOU/UL (ref 0–0.2)
BASOPHILS NFR BLD AUTO: 0.1 % (ref 0–1)
BUN SERPL-MCNC: 23 MG/DL (ref 8.4–25.7)
CALCIUM SERPL-MCNC: 7.4 MG/DL (ref 7.8–10.44)
CHLORIDE SERPL-SCNC: 104 MMOL/L (ref 98–107)
CO2 SERPL-SCNC: 17 MMOL/L (ref 23–31)
CREAT CL PREDICTED SERPL C-G-VRATE: 88 ML/MIN (ref 70–130)
EOSINOPHIL # BLD AUTO: 0 THOU/UL (ref 0–0.7)
EOSINOPHIL NFR BLD AUTO: 0.2 % (ref 0–10)
GLUCOSE SERPL-MCNC: 166 MG/DL (ref 83–110)
HGB BLD-MCNC: 9.4 G/DL (ref 14–18)
LYMPHOCYTES # BLD: 0.9 THOU/UL (ref 1.2–3.4)
LYMPHOCYTES NFR BLD AUTO: 14.3 % (ref 21–51)
MCH RBC QN AUTO: 30.7 PG (ref 27–31)
MCV RBC AUTO: 95.6 FL (ref 78–98)
MONOCYTES # BLD AUTO: 0.3 THOU/UL (ref 0.11–0.59)
MONOCYTES NFR BLD AUTO: 5.5 % (ref 0–10)
NEUTROPHILS # BLD AUTO: 5 THOU/UL (ref 1.4–6.5)
NEUTROPHILS NFR BLD AUTO: 79.8 % (ref 42–75)
PLATELET # BLD AUTO: 230 THOU/UL (ref 130–400)
POTASSIUM SERPL-SCNC: 3.8 MMOL/L (ref 3.5–5.1)
RBC # BLD AUTO: 3.07 MILL/UL (ref 4.7–6.1)
SODIUM SERPL-SCNC: 134 MMOL/L (ref 136–145)
WBC # BLD AUTO: 6.2 THOU/UL (ref 4.8–10.8)

## 2021-02-10 RX ADMIN — Medication SCH ML: at 21:00

## 2021-02-10 RX ADMIN — MEROPENEM AND SODIUM CHLORIDE SCH MLS: 1 INJECTION, SOLUTION INTRAVENOUS at 09:58

## 2021-02-10 RX ADMIN — ASPIRIN 81 MG CHEWABLE TABLET SCH MG: 81 TABLET CHEWABLE at 20:03

## 2021-02-10 RX ADMIN — MEROPENEM AND SODIUM CHLORIDE SCH MLS: 1 INJECTION, SOLUTION INTRAVENOUS at 17:38

## 2021-02-10 RX ADMIN — Medication SCH MG: at 09:56

## 2021-02-10 RX ADMIN — MEROPENEM AND SODIUM CHLORIDE SCH MLS: 1 INJECTION, SOLUTION INTRAVENOUS at 02:27

## 2021-02-10 RX ADMIN — Medication SCH ML: at 09:56

## 2021-02-10 RX ADMIN — CYANOCOBALAMIN TAB 1000 MCG SCH MCG: 1000 TAB at 20:03

## 2021-02-10 NOTE — PDOC.HOSPP
- Subjective


Encounter Date: 02/10/21


Encounter Time: 08:15


Subjective: 


Patient seen and examined bedside today, patient is on 45 L 45% oxygen, he is 

physically very weak, subjectively he feels okay





- Objective


Vital Signs & Weight: 


                             Vital Signs (12 hours)











  Temp Pulse Resp BP Pulse Ox


 


 02/10/21 08:15  97.8 F  104 H  20  137/84  100


 


 02/10/21 08:12      100


 


 02/10/21 06:00   106 H  20  115/70  100


 


 02/10/21 05:40  96.3 F L  82  22 H  115/70  100


 


 02/10/21 02:00   99  20   100








                                     Weight











Admit Weight                   201 lb


 


Weight                         186 lb 4.65 oz














I&O: 


                                        











 02/09/21 02/10/21 02/11/21





 06:59 06:59 06:59


 


Intake Total 690 1387.1 


 


Output Total 1200 1300 


 


Balance -510 87.1 











Result Diagrams: 


                                 02/10/21 09:56





                                 02/10/21 04:29


Additional Labs: 


                                   Accuchecks











  02/10/21





  00:06


 


POC Glucose  126 H











Radiology Reviewed by me: Yes


EKG Reviewed by me: Yes





Hospitalist ROS





- Review of Systems


Constitutional: reports: weakness, malaise


ENT: denies: ear pain, ear discharge, nose pain, nose discharge, nose 

congestion, mouth pain, mouth swelling, throat pain, throat swelling, other


Respiratory: reports: shortness of breath, SOB with excertion.  denies: cough, 

dry, hemoptysis, pleuritic pain, sputum, wheezing, other


Cardiovascular: denies: chest pain, palpitations, orthopnea, paroxysmal noc. 

dyspnea, edema, light headedness, other


Gastrointestinal: denies: nausea, vomiting, abdominal pain, diarrhea, 

constipation, melena, hematochezia, other


Genitourinary: denies: dysuria, frequency, incontinence, hematuria, retention, 

other


Musculoskeletal: denies: neck pain, shoulder pain, arm pain, back pain, hand 

pain, leg pain, foot pain, other





- Medication


Medications: 


Active Medications











Generic Name Dose Route Start Last Admin





  Trade Name Freq  PRN Reason Stop Dose Admin


 


Acetaminophen  650 mg  01/29/21 03:46  02/02/21 08:46





  Acetaminophen 325 Mg Tab  PO   650 mg





  Q4H PRN   Administration





  Headache/Fever/Mild Pain (1-3)  


 


Apixaban  5 mg  02/09/21 21:00  02/10/21 09:55





  Apixaban 5 Mg Tab  PO   5 mg





  BID AMANDA   Administration


 


Ascorbic Acid  1,000 mg  01/29/21 09:00  02/10/21 09:56





  Ascorbic Acid 500 Mg Chewable Tablet  PO   1,000 mg





  DAILY AMANDA   Administration


 


Aspirin  81 mg  01/29/21 21:00  02/09/21 21:09





  Aspirin Chewable 81 Mg Tab  PO   81 mg





  HS AMANDA   Administration


 


Colchicine  0.6 mg  01/29/21 09:00  02/10/21 09:58





  Colchicine 0.6 Mg Tab  PO   0.6 mg





  BID AMANDA   Administration


 


Dexamethasone  6 mg  02/09/21 21:00  02/10/21 09:56





  Dexamethasone 4 Mg/Ml Vial  SLOW IVP   6 mg





  BID AMANDA   Administration


 


Guaifenesin/Dextromethorphan  15 ml  01/29/21 03:46  01/29/21 11:37





  Guaifenesin Dm 100-10/5 Ml Udcup  PO   15 ml





  Q4H PRN   Administration





  Cough  


 


Meropenem 1 gm/ Device  50 mls @ 100 mls/hr  02/05/21 18:00  02/10/21 09:58





  IVPB   50 mls





  0200,1000,1800 AMANDA   Administration


 


Doxycycline Hyclate 100 mg/  100 mls @ 100 mls/hr  02/09/21 21:00  02/10/21 

09:58





  Sodium Chloride  IVPB  02/19/21 21:01  100 mls





  Q12HR AMANDA   Administration


 


Dopamine HCl/Dextrose  250 mls @ 23.766 mls/hr  02/09/21 17:16  02/10/21 05:52





  Dopamine 400 Mg/D5w 250 Ml  IVPB   250 mls





  INF AMANDA   Administration





  7.5 MCG/KG/MIN  


 


Pantoprazole Sodium  40 mg  02/04/21 21:00  02/10/21 09:58





  Pantoprazole 40 Mg Vial  IVP   40 mg





  Q12HR AMANDA   Administration


 


Phenol  0 ml  02/08/21 15:46  02/08/21 16:22





  Chloraseptic Spray 180 Ml Bottle  PO   2 spr





  Q1H PRN   Administration





  SORE THROAT  


 


Potassium Chloride  16 meq  02/04/21 08:00  02/10/21 09:57





  Potassium Chloride 8 Meq Tab  PO   16 meq





  QAM-WM AMANDA   Administration


 


Sodium Chloride  10 ml  02/01/21 21:00  02/10/21 09:56





  Flush - Normal Saline 10 Ml Syringe  IVF   10 ml





  Q12HR AMANDA   Administration


 


Sodium Chloride  10 ml  02/01/21 13:30  02/06/21 02:11





  Flush - Normal Saline 10 Ml Syringe  IVF   10 ml





  PRN PRN   Administration





  Saline Flush  


 


Zinc Sulfate  220 mg  01/29/21 09:00  02/10/21 09:55





  Zinc Sulfate 220 Mg Cap  PO   220 mg





  DAILY AMANDA   Administration














Hospitalist Exam


Vitals: 


                             Vital Signs (12 hours)











  Temp Pulse Resp BP Pulse Ox


 


 02/10/21 08:15  97.8 F  104 H  20  137/84  100


 


 02/10/21 08:12      100


 


 02/10/21 06:00   106 H  20  115/70  100


 


 02/10/21 05:40  96.3 F L  82  22 H  115/70  100


 


 02/10/21 02:00   99  20   100








                                     Weight











Admit Weight                   201 lb


 


Weight                         186 lb 4.65 oz














General Appearance: NAD, awake alert, ill appearing


Eye: PERRL, anicteric sclera


ENT: normocephalic atraumatic, no oropharyngeal lesions


Neck: supple, symmetric, no JVD, no thyromegaly


Heart: RRR, no murmur, no gallops, no rubs


Respiratory: no wheezes, no rales, no ronchi


Respiratory - other findings: Bilateral basilar rales noted,


Gastrointestinal: soft, non-tender, non-distended, normal bowel sounds


Extremities: 1+ LE edema


Skin: normal turgor, no lesions


Neurological: no focal deficits


Musculoskeletal: normal tone, normal strength


Psychiatric: normal affect, normal behavior





Hosp A/P


(1) Acute respiratory failure due to COVID-19


Code(s): U07.1 - COVID-19; J96.00 - ACUTE RESPIRATORY FAILURE, UNSP W HYPOXIA OR

HYPERCAPNIA   Status: Acute   





(2) Pneumonia due to 2019 novel coronavirus


Code(s): U07.1 - COVID-19; J12.82 - PNEUMONIA DUE TO CORONAVIRUS DISEASE 2019   

Status: Acute   





(3) COVID-19 virus infection


Code(s): U07.1 - COVID-19   Status: Acute   





(4) CKD (chronic kidney disease), stage III


Code(s): N18.3 - CHRONIC KIDNEY DISEASE, STAGE 3 (MODERATE) * DO NOT USE *   

Status: Acute   





(5) HTN (hypertension)


Code(s): I10 - ESSENTIAL (PRIMARY) HYPERTENSION   Status: Chronic   


Qualifiers: 


   Hypertension type: essential hypertension   Qualified Code(s): I10 - 

Essential (primary) hypertension   





(6) Prostate cancer metastatic to multiple sites


Code(s): C61 - MALIGNANT NEOPLASM OF PROSTATE   Status: Chronic   





(7) Spinal stenosis of lumbar region


Code(s): M48.061 - SPINAL STENOSIS, LUMBAR REGION WITHOUT NEUROGENIC CASA   

Status: Chronic   


Qualifiers: 


   Neurogenic claudication status: unspecified   Qualified Code(s): M48.061 - 

Spinal stenosis, lumbar region without neurogenic claudication   





(8) Atrial fibrillation with rapid ventricular response


Code(s): I48.91 - UNSPECIFIED ATRIAL FIBRILLATION   Status: Acute   





(9) DVT (deep venous thrombosis)


Code(s): I82.409 - ACUTE EMBOLISM AND THOMBOS UNSP DEEP VN UNSP LOWER EXTREMITY 

 Status: Acute   


Qualifiers: 


   DVT location: lower extremity   Chronicity: acute   Laterality: bilateral 





(10) Urinary tract infection


Status: Acute   


Qualifiers: 


   Urinary tract infection type: acute cystitis   Hematuria presence: without 

hematuria   Qualified Code(s): N30.00 - Acute cystitis without hematuria   





(11) KENAN (acute kidney injury)


Code(s): N17.9 - ACUTE KIDNEY FAILURE, UNSPECIFIED   Status: Acute   





(12) Acute on chronic systolic (congestive) heart failure


Code(s): I50.23 - ACUTE ON CHRONIC SYSTOLIC (CONGESTIVE) HEART FAILURE   Status:

Acute   





- Plan


old records reviewed/req, 





Currently patient is on high flow oxygen,


Continue dopamine drip as per cardiology


Pulmonology recommendation appreciated, patient is started on high-dose of 

dexamethasone,


Continue doxycycline and meropenem


Continue Eliquis


Continue Protonix


Medication reviewed and continue provide symptomatic and supportive care


Prognosis poor,


We will ask  to work on evaluation for LTAC if he qualifies

## 2021-02-10 NOTE — PDOC.CPN
- Subjective


Date: 02/10/21


Time: 13:07


Interval history: 





Mr. Link is sitting up in bed, he is alert & oriented, he is on hi-flow oxygen 

today, he denies any pain. he denies chest pain. he c/o mild shortness of 

breath. He has no other complaints today. he states he is hungry, his lunch tray

arrived during my exam, he drank a shake while I was in the room. 





- Review of Systems


General: denies: fever/chills, weight/appetite/sleep changes, night sweats, f

atigue


Respiratory: reports: shortness of breath.  denies: cough, congestion, exercise 

intolerance


Cardiovascular: reports: edema.  denies: chest pain, palpitation, paroxysmal 

nocturnal dyspnea, orthopnea


Gastrointestinal: denies: nausea, vomiting, diarrhea, constipation, abd pain, GI

bleeding


Musculoskeletal: denies: pain, tenderness, stiffness, swelling, 

arthritis/arthralgias


Neurological: reports: numbness.  denies: syncope, seizure, weakness





- Objective


Allergies/Adverse Reactions: 


                                    Allergies











Allergy/AdvReac Type Severity Reaction Status Date / Time


 


amitriptyline Allergy  Confusion Verified 11/05/20 06:25


 


hydrochlorothiazide Allergy  Hives Verified 11/04/20 22:58


 


valsartan Allergy  Hives Verified 11/04/20 22:58


 


ACE Inhibitors AdvReac  Hives Verified 11/04/20 22:58











Visit Medications: 


                               Current Medications





Acetaminophen (Acetaminophen 325 Mg Tab)  650 mg PO Q4H PRN


   PRN Reason: Headache/Fever/Mild Pain (1-3)


   Last Admin: 02/02/21 08:46 Dose:  650 mg


   Documented by: 


Hydrocodone Bitart/Acetaminophen (Hydrocodone/Acetaminophen 5/325 Mg Tablet)  1 

tab PO Q4H PRN


   PRN Reason: Moderate Pain (4-6)


Albuterol/Ipratropium (Ipratropium/Albuterol Sulfate 3 Ml Neb)  3 ml NEB [id number]

PRN


   PRN Reason: SOB &/or Wheezing


Apixaban (Apixaban 5 Mg Tab)  5 mg PO BID [facility]


   Last Admin: 02/10/21 09:55 Dose:  5 mg


   Documented by: 


Ascorbic Acid (Ascorbic Acid 500 Mg Chewable Tablet)  1,000 mg PO DAILY [facility]


   Last Admin: 02/10/21 09:56 Dose:  1,000 mg


   Documented by: 


Aspirin (Aspirin Chewable 81 Mg Tab)  81 mg PO [facility]


   Last Admin: 02/09/21 21:09 Dose:  81 mg


   Documented by: 


Benzonatate (Benzonatate 100 Mg Cap)  100 mg PO Q6H PRN


   PRN Reason: Cough


Bisacodyl (Bisacodyl 5 Mg Tab)  5 mg PO DAILY PRN


   PRN Reason: Constipation


Bisacodyl (Bisacodyl 10 Mg Supp)  10 mg MI DAILYPRN PRN


   PRN Reason: Constipation


Calcium Carbonate (Calcium Carbonate 500 Mg Chewtab)  1,000 mg PO Q4H PRN


   PRN Reason: Heartburn  or Indigestion


Colchicine (Colchicine 0.6 Mg Tab)  0.6 mg PO BID [facility]


   Last Admin: 02/10/21 09:58 Dose:  0.6 mg


   Documented by: 


Cyanocobalamin (Cyanocobalamin (Vitamin B-12) 1,000 Mcg Tab)  1,000 mcg PO [facility]


Dexamethasone (Dexamethasone 4 Mg/Ml Vial)  6 mg SLOW IVP BID [facility]


   Last Admin: 02/10/21 09:56 Dose:  6 mg


   Documented by: 


Docusate Sodium (Docusate 100 Mg Cap)  100 mg PO DAILY PRN


   PRN Reason: Constipation


Furosemide (Furosemide 20 Mg Tab)  20 mg PO DAILY [facility]


Guaifenesin (Guaifenesin Sf Soln 200 Mg/10 Ml Udcup)  200 mg PO Q4H PRN


   PRN Reason: Cough


Guaifenesin/Dextromethorphan (Guaifenesin Dm 100-10/5 Ml Udcup)  15 ml PO Q4H 

PRN


   PRN Reason: Cough


   Last Admin: 01/29/21 11:37 Dose:  15 ml


   Documented by: 


Hydralazine HCl (Hydralazine 20 Mg/Ml Vial)  10 mg SLOW IVP Q4H PRN


   PRN Reason: SBP > 180 and HR < 70


Promethazine HCl 12.5 mg/ (Sodium Chloride)  50.5 mls @ 202 mls/hr IVPB Q6H PRN


   PRN Reason: Nausea/vomiting use second


Meropenem 1 gm/ Device  50 mls @ 100 mls/hr IVPB 0200,1000,1800 [facility]


   Last Admin: 02/10/21 09:58 Dose:  50 mls


   Documented by: 


Doxycycline Hyclate 100 mg/ (Sodium Chloride)  100 mls @ 100 mls/hr IVPB Q12HR 

[facility]


   Stop: 02/19/21 21:01


   Last Admin: 02/10/21 09:58 Dose:  100 mls


   Documented by: 


Dopamine HCl/Dextrose (Dopamine 400 Mg/D5w 250 Ml)  250 mls @ 15.844 mls/hr IVPB

INF [facility]


Loperamide HCl (Loperamide Hcl 2 Mg Cap)  2 mg PO PRN PRN


   PRN Reason: Diarrhea/Loose Stools


Loratadine (Loratadine 10 Mg Tab)  10 mg PO DAILYPRN PRN


   PRN Reason: Sinus Symptoms


Melatonin (Melatonin 3 Mg Tab)  3 mg PO HS PRN


   PRN Reason: Insomnia


Miscellaneous Medication (Electrolyte Replacement Protocol)  0 each FS ASDIR 

PRN; Protocol


   PRN Reason: ELECTROLYTE REPLACEMENT


Mometasone Furoate/Formoterol Fumar (Mometasone 200 Mcg/Formoterol 5 Mcg 120 

Puff Inhaler)  2 puff INH BID-RT [facility]


Ondansetron HCl (Ondansetron Pf 4 Mg/2 Ml Vial)  4 mg IVP Q6H PRN


   PRN Reason: Nausea/Vomiting use 1st


Ondansetron HCl (Ondansetron Odt 4 Mg Tab)  4 mg PO Q6H PRN


   PRN Reason: Nausea/Vomiting


Pantoprazole Sodium (Pantoprazole 40 Mg Vial)  40 mg IVP Q12HR [facility]


   Last Admin: 02/10/21 09:58 Dose:  40 mg


   Documented by: 


Phenol (Chloraseptic Spray 180 Ml Bottle)  0 ml PO Q1H PRN


   PRN Reason: SORE THROAT


   Last Admin: 02/08/21 16:22 Dose:  2 spr


   Documented by: 


Potassium Chloride (Potassium Chloride 8 Meq Tab)  16 meq PO QAM-WM [facility]


   Last Admin: 02/10/21 09:57 Dose:  16 meq


   Documented by: 


Senna/Docusate Sodium (Senokot S 8.6-50 Mg Tab)  2 tab PO BID PRN


   PRN Reason: Constipation


Sodium Chloride (Flush - Normal Saline 10 Ml Syringe)  10 ml IVF Q12HR [facility]


   Last Admin: 02/10/21 09:56 Dose:  10 ml


   Documented by: 


Sodium Chloride (Flush - Normal Saline 10 Ml Syringe)  10 ml IVF PRN PRN


   PRN Reason: Saline Flush


   Last Admin: 02/06/21 02:11 Dose:  10 ml


   Documented by: 


Sodium Chloride (Sodium Chloride 0.65% Nasal 44 Ml Bot)  0 ml EA NARE QIDPRN PRN


   PRN Reason: Nasal Congestion


Throat Lozenges (Cepastat Lozenges 1 Sylvia)  1 sylvia PO Q2H PRN


   PRN Reason: Sore Throat


Zinc Sulfate (Zinc Sulfate 220 Mg Cap)  220 mg PO DAILY [facility]


   Last Admin: 02/10/21 09:55 Dose:  220 mg


   Documented by: 








Vital Signs & Weight: 


                                   Vital Signs











  Temp Pulse Resp BP Pulse Ox


 


 02/10/21 11:32  98.2 F  136 H  20  141/85 H  100


 


 02/10/21 08:15  97.8 F  104 H  20  137/84  100


 


 02/10/21 08:12      100


 


 02/10/21 06:00   106 H  20  115/70  100


 


 02/10/21 05:40  96.3 F L  82  22 H  115/70  100


 


 02/10/21 02:00   99  20   100








                                        











Admit Weight                   201 lb


 


Weight                         186 lb 4.65 oz

















- Quality Measures


Condition: Atrial Fibrillation/Flutter (hx or current), Heart Failure


CV meds: Beta Blocker: No (on hold d/t hypotension ), ACE/ARB: No (Hypotension 

), ASA: Yes, Anticoagulant: Yes (Eliquis)





- Medication Contraindications


No ACE/ARB reason: Medical contraindication


No Anticoagulant reason: Medical contraindication (r/o GI bleed)





- Physical Exam


General: alert & oriented x3


HEENT: mucus membranes moist


Neck: no bruit


Cardiac: tachycardia, S1/S2


Lungs: decreased breath sounds, oxygen


Neuro: numbness (numbness to BLE)


Abdomen: active bowel sounds, soft, distended


Extremities: 1+ LE edema, other: (decreased pulses to BLE, cyanotic toes)


Skin: cool, other (scattered bruising throughout bilateral arms)


Musculoskeletal: no pain, decreased range of motion





- Labs


Result Diagrams: 


                                 02/10/21 09:56





                                 02/10/21 04:29


                                  Troponin/CKMB











Troponin I  0.051 ng/mL (< 0.028)  H  02/07/21  05:15    














- EKG Interpretation


EKG Method: Telemetry ('s)





- Assessment/Plan


Assessment/Plan: 





1. Atrial fibrillation: He is a CHADSVASC score of a 6. He remains MAT -

120's. His Coreg has been on hold at this time d/t hypotension.  His BP have 

been more stable over the last 24hrs, if his BP continues to be stable, will 

consider adding Coreg for HR control.


2. COVID-19 Pneumonia: on hi-flow nasal cannula, treated by primary care 

services


3. Cardiomyopathy: EF 40-45% (August 2020) his BP has been stable for the last 

24 hrs, will decrease Dopamine drip and continue to monitor.  


4. Metastatic Prostate Cancer


5. UTI with gram negative rods: treated by primary care services


6. Bilateral lower extremity edema: His BLE edema is much improved. 


7. Bilateral DVT: Venous duplex revealed bilateral non-occlusive thrombus 

involving the deep venous system of each lower extremity, he has refused an IVC 

filter, he has been placed on PO Eliquis. His Hemoglobin dropped from 11.3 to 

9.4 today.


8. Blood in stool: no more blood in stool or diarrhea per patient report





We will continue to follow the patient and continue symptomatic treatment at 

this time.

## 2021-02-10 NOTE — PRG
DATE OF SERVICE:  02/10/2021



SUBJECTIVE:  Randy Link remains on high-flow, but appears to be in no acute

distress. 



I discussed with his care taker nurse.  The patient had a good response to the

diuretic with sats showing improvement. 



His x-ray was consistent with CHF, and his BNP was significantly elevated as noted.



OBJECTIVE:  VITAL SIGNS:  Temperature 98, pulse 104, respiratory rate 20,

saturations are 100% on high-flow 45, flow rate 45%.  I's and O's negative. 

CHEST:  No wheezing.  No crackles. 

CARDIAC:  Normal S1 __________.



LABORATORY DATA:  Lytes are normal.



IMPRESSION:  

1. Respiratory failure.

2. Congestive heart failure.

3. Corona positive status.



PLAN:  Pulmonary wise, continue present treatment.  Pulmonary is going to follow at

a distance.  Call if needed. 







Job ID:  [id number]

## 2021-02-11 LAB
ALBUMIN SERPL BCG-MCNC: 2.4 G/DL (ref 3.4–4.8)
ALP SERPL-CCNC: 117 U/L (ref 40–110)
ALT SERPL W P-5'-P-CCNC: 11 U/L (ref 8–55)
ANION GAP SERPL CALC-SCNC: 15 MMOL/L (ref 10–20)
AST SERPL-CCNC: 13 U/L (ref 5–34)
BASOPHILS # BLD AUTO: 0 THOU/UL (ref 0–0.2)
BASOPHILS NFR BLD AUTO: 0 % (ref 0–1)
BILIRUB SERPL-MCNC: 0.5 MG/DL (ref 0.2–1.2)
BUN SERPL-MCNC: 27 MG/DL (ref 8.4–25.7)
CALCIUM SERPL-MCNC: 7.5 MG/DL (ref 7.8–10.44)
CHLORIDE SERPL-SCNC: 103 MMOL/L (ref 98–107)
CO2 SERPL-SCNC: 23 MMOL/L (ref 23–31)
CREAT CL PREDICTED SERPL C-G-VRATE: 91 ML/MIN (ref 70–130)
CRP SERPL-MCNC: (no result) MG/DL
EOSINOPHIL # BLD AUTO: 0 THOU/UL (ref 0–0.7)
EOSINOPHIL NFR BLD AUTO: 0.2 % (ref 0–10)
GLOBULIN SER CALC-MCNC: 2.6 G/DL (ref 2.4–3.5)
GLUCOSE SERPL-MCNC: 106 MG/DL (ref 83–110)
HGB BLD-MCNC: 10.8 G/DL (ref 14–18)
LYMPHOCYTES # BLD: 1.1 THOU/UL (ref 1.2–3.4)
LYMPHOCYTES NFR BLD AUTO: 12.1 % (ref 21–51)
MCH RBC QN AUTO: 30.7 PG (ref 27–31)
MCV RBC AUTO: 96.8 FL (ref 78–98)
MONOCYTES # BLD AUTO: 0.3 THOU/UL (ref 0.11–0.59)
MONOCYTES NFR BLD AUTO: 3.1 % (ref 0–10)
NEUTROPHILS # BLD AUTO: 7.8 THOU/UL (ref 1.4–6.5)
NEUTROPHILS NFR BLD AUTO: 84.6 % (ref 42–75)
PLATELET # BLD AUTO: 250 THOU/UL (ref 130–400)
POTASSIUM SERPL-SCNC: 3.2 MMOL/L (ref 3.5–5.1)
RBC # BLD AUTO: 3.52 MILL/UL (ref 4.7–6.1)
SODIUM SERPL-SCNC: 138 MMOL/L (ref 136–145)
WBC # BLD AUTO: 9.2 THOU/UL (ref 4.8–10.8)

## 2021-02-11 RX ADMIN — MULTIPLE VITAMINS W/ MINERALS TAB SCH TAB: TAB at 08:45

## 2021-02-11 RX ADMIN — CYANOCOBALAMIN TAB 1000 MCG SCH MCG: 1000 TAB at 21:25

## 2021-02-11 RX ADMIN — Medication SCH ML: at 09:45

## 2021-02-11 RX ADMIN — MEROPENEM AND SODIUM CHLORIDE SCH MLS: 1 INJECTION, SOLUTION INTRAVENOUS at 09:10

## 2021-02-11 RX ADMIN — ASPIRIN 81 MG CHEWABLE TABLET SCH MG: 81 TABLET CHEWABLE at 21:25

## 2021-02-11 RX ADMIN — Medication SCH MG: at 09:01

## 2021-02-11 RX ADMIN — MEROPENEM AND SODIUM CHLORIDE SCH MLS: 1 INJECTION, SOLUTION INTRAVENOUS at 02:15

## 2021-02-11 RX ADMIN — Medication SCH ML: at 21:27

## 2021-02-11 RX ADMIN — MEROPENEM AND SODIUM CHLORIDE SCH MLS: 1 INJECTION, SOLUTION INTRAVENOUS at 18:19

## 2021-02-11 NOTE — PDOC.HOSPP
- Subjective


Encounter Date: 02/11/21


Encounter Time: 08:00


Subjective: 


Patient seen and examined. No new complaints. No overnight events, no 

significant change in overall condition, subjectively feels better,





- Objective


Vital Signs & Weight: 


                             Vital Signs (12 hours)











  Temp Pulse Resp BP Pulse Ox


 


 02/11/21 08:33  97.7 F  107 H  16  140/79  100


 


 02/11/21 07:00      100


 


 02/11/21 06:00   116 H  18  124/69 


 


 02/11/21 04:00   98  20  141/73 H 


 


 02/11/21 02:00   97  24 H  125/73 








                                     Weight











Admit Weight                   201 lb


 


Weight                         186 lb 4.65 oz














I&O: 


                                        











 02/10/21 02/11/21 02/12/21





 06:59 06:59 06:59


 


Intake Total 1387.1 663 


 


Output Total 1300 575 


 


Balance 87.1 88 











Result Diagrams: 


                                 02/11/21 04:30





                                 02/11/21 04:30


Radiology Reviewed by me: Yes


EKG Reviewed by me: Yes





Hospitalist ROS





- Review of Systems


Constitutional: reports: weakness, malaise


ENT: denies: ear pain, ear discharge, nose pain, nose discharge, nose 

congestion, mouth pain, mouth swelling, throat pain, throat swelling, other


Respiratory: reports: shortness of breath, SOB with excertion.  denies: cough, 

dry, hemoptysis, pleuritic pain, sputum, wheezing, other


Cardiovascular: denies: chest pain, palpitations, orthopnea, paroxysmal noc. 

dyspnea, edema, light headedness, other


Gastrointestinal: denies: nausea, vomiting, abdominal pain, diarrhea, 

constipation, melena, hematochezia, other


Genitourinary: denies: dysuria, frequency, incontinence, hematuria, retention, 

other


Musculoskeletal: denies: neck pain, shoulder pain, arm pain, back pain, hand 

pain, leg pain, foot pain, other





- Medication


Medications: 


Active Medications











Generic Name Dose Route Start Last Admin





  Trade Name Freq  PRN Reason Stop Dose Admin


 


Acetaminophen  650 mg  01/29/21 03:46  02/02/21 08:46





  Acetaminophen 325 Mg Tab  PO   650 mg





  Q4H PRN   Administration





  Headache/Fever/Mild Pain (1-3)  


 


Apixaban  5 mg  02/09/21 21:00  02/11/21 08:45





  Apixaban 5 Mg Tab  PO   5 mg





  BID AMANDA   Administration


 


Ascorbic Acid  1,000 mg  01/29/21 09:00  02/11/21 09:01





  Ascorbic Acid 500 Mg Chewable Tablet  PO   1,000 mg





  DAILY AMANDA   Administration


 


Aspirin  81 mg  01/29/21 21:00  02/10/21 20:03





  Aspirin Chewable 81 Mg Tab  PO   81 mg





  HS AMANDA   Administration


 


Colchicine  0.6 mg  01/29/21 09:00  02/11/21 08:45





  Colchicine 0.6 Mg Tab  PO   0.6 mg





  BID AMANDA   Administration


 


Cyanocobalamin  1,000 mcg  02/10/21 21:00  02/10/21 20:03





  Cyanocobalamin (Vitamin B-12) 1,000 Mcg Tab  PO   1,000 mcg





  HS AMANDA   Administration


 


Ferrous Sulfate  325 mg  02/11/21 08:00  02/11/21 08:45





  Ferrous Sulfate 325 Mg Tab  PO   325 mg





  QAM-WM AMANDA   Administration


 


Folic Acid  1 mg  02/11/21 09:00  02/11/21 08:45





  Folic Acid 1 Mg Tab  PO   1 mg





  DAILY AMANDA   Administration


 


Furosemide  20 mg  02/11/21 09:00  02/11/21 08:45





  Furosemide 20 Mg Tab  PO   20 mg





  DAILY AMANDA   Administration


 


Guaifenesin/Dextromethorphan  15 ml  01/29/21 03:46  01/29/21 11:37





  Guaifenesin Dm 100-10/5 Ml Udcup  PO   15 ml





  Q4H PRN   Administration





  Cough  


 


Meropenem 1 gm/ Device  50 mls @ 100 mls/hr  02/05/21 18:00  02/11/21 09:10





  IVPB   50 mls





  0200,1000,1800 AMANDA   Administration


 


Doxycycline Hyclate 100 mg/  100 mls @ 100 mls/hr  02/09/21 21:00  02/11/21 

08:47





  Sodium Chloride  IVPB  02/19/21 21:01  100 mls





  Q12HR AMANDA   Administration


 


Iron/Minerals/Multivitamins  1 tab  02/11/21 09:00  02/11/21 08:45





  Multivitamin W/ Minerals 1 Tab  PO   1 tab





  DAILY AMANDA   Administration


 


Pantoprazole Sodium  40 mg  02/11/21 09:00  02/11/21 09:02





  Pantoprazole 40 Mg Tab  PO   40 mg





  Q12HR AMANDA   Administration


 


Phenol  0 ml  02/08/21 15:46  02/08/21 16:22





  Chloraseptic Spray 180 Ml Bottle  PO   2 spr





  Q1H PRN   Administration





  SORE THROAT  


 


Potassium Chloride  16 meq  02/04/21 08:00  02/11/21 08:45





  Potassium Chloride 8 Meq Tab  PO   16 meq





  QAM-WM AMANDA   Administration


 


Saccharomyces Boulardii  250 mg  02/11/21 09:00  02/11/21 09:01





  Saccharomyces Boulardii 250 Mg Cap  PO   250 mg





  DAILY AMANDA   Administration


 


Sodium Chloride  10 ml  02/01/21 21:00  02/11/21 09:45





  Flush - Normal Saline 10 Ml Syringe  IVF   10 ml





  Q12HR AMANDA   Administration


 


Sodium Chloride  10 ml  02/01/21 13:30  02/06/21 02:11





  Flush - Normal Saline 10 Ml Syringe  IVF   10 ml





  PRN PRN   Administration





  Saline Flush  


 


Zinc Sulfate  220 mg  01/29/21 09:00  02/11/21 08:45





  Zinc Sulfate 220 Mg Cap  PO   220 mg





  DAILY AMANDA   Administration














Hospitalist Exam


Vitals: 


                             Vital Signs (12 hours)











  Temp Pulse Resp BP Pulse Ox


 


 02/11/21 08:33  97.7 F  107 H  16  140/79  100


 


 02/11/21 07:00      100


 


 02/11/21 06:00   116 H  18  124/69 


 


 02/11/21 04:00   98  20  141/73 H 


 


 02/11/21 02:00   97  24 H  125/73 








                                     Weight











Admit Weight                   201 lb


 


Weight                         186 lb 4.65 oz














General Appearance: NAD, awake alert


Eye: PERRL, anicteric sclera


ENT: normocephalic atraumatic, no oropharyngeal lesions


Neck: supple, symmetric, no JVD, no thyromegaly


Heart: no murmur, no gallops, no rubs


Respiratory: no wheezes, no rales, no ronchi


Respiratory - other findings: Basal rales noted


Gastrointestinal: soft, non-tender, non-distended, normal bowel sounds


Extremities: 1+ LE edema


Skin: normal turgor, no lesions


Neurological: no focal deficits


Musculoskeletal: normal tone, normal strength


Psychiatric: normal affect, normal behavior





Hosp A/P


(1) Acute respiratory failure due to COVID-19


Code(s): U07.1 - COVID-19; J96.00 - ACUTE RESPIRATORY FAILURE, UNSP W HYPOXIA OR

HYPERCAPNIA   Status: Acute   





(2) Pneumonia due to 2019 novel coronavirus


Code(s): U07.1 - COVID-19; J12.82 - PNEUMONIA DUE TO CORONAVIRUS DISEASE 2019   

Status: Acute   





(3) COVID-19 virus infection


Code(s): U07.1 - COVID-19   Status: Acute   





(4) CKD (chronic kidney disease), stage III


Code(s): N18.3 - CHRONIC KIDNEY DISEASE, STAGE 3 (MODERATE) * DO NOT USE *   

Status: Acute   





(5) HTN (hypertension)


Code(s): I10 - ESSENTIAL (PRIMARY) HYPERTENSION   Status: Chronic   


Qualifiers: 


   Hypertension type: essential hypertension   Qualified Code(s): I10 - 

Essential (primary) hypertension   





(6) Prostate cancer metastatic to multiple sites


Code(s): C61 - MALIGNANT NEOPLASM OF PROSTATE   Status: Chronic   





(7) Spinal stenosis of lumbar region


Code(s): M48.061 - SPINAL STENOSIS, LUMBAR REGION WITHOUT NEUROGENIC CASA   

Status: Chronic   


Qualifiers: 


   Neurogenic claudication status: unspecified   Qualified Code(s): M48.061 - 

Spinal stenosis, lumbar region without neurogenic claudication   





(8) Atrial fibrillation with rapid ventricular response


Code(s): I48.91 - UNSPECIFIED ATRIAL FIBRILLATION   Status: Acute   





(9) DVT (deep venous thrombosis)


Code(s): I82.409 - ACUTE EMBOLISM AND THOMBOS UNSP DEEP VN UNSP LOWER EXTREMITY 

 Status: Acute   


Qualifiers: 


   DVT location: lower extremity   Chronicity: acute   Laterality: bilateral 





(10) Urinary tract infection


Status: Acute   


Qualifiers: 


   Urinary tract infection type: acute cystitis   Hematuria presence: without he

maturia   Qualified Code(s): N30.00 - Acute cystitis without hematuria   





(11) KENAN (acute kidney injury)


Code(s): N17.9 - ACUTE KIDNEY FAILURE, UNSPECIFIED   Status: Acute   





(12) Acute on chronic systolic (congestive) heart failure


Code(s): I50.23 - ACUTE ON CHRONIC SYSTOLIC (CONGESTIVE) HEART FAILURE   Status:

Acute   





- Plan


old records reviewed/req, continue antibiotics





Currently patient is on high flow oxygen,


Continue dopamine drip as per cardiology


Continue prednisone


Continue doxycycline and meropenem


Continue Eliquis


Continue Protonix


Medication reviewed and continue provide symptomatic and supportive care


Prognosis poor,


 needs to work on LTAC if he qualifies

## 2021-02-11 NOTE — PRG
DATE OF SERVICE:  



SUBJECTIVE:  Randy Link is a 80-year-old gentleman, who is now oxygenating

better. 



OBJECTIVE:  VITAL SIGNS:  Temperature 97, pulse 106, respirations 15, sats are 100%

on 6 L, is down from high-flow. 

CHEST:  No wheezing.  No crackles. 

CARDIAC:  Normal S1, S2.  No gallops.



LABORATORY DATA:  White count is only 9000.  His renal function is normal.



ASSESSMENT:  Respiratory failure, mild azotemia, congestive heart failure, elevated

BNP, pleural effusion. 



PLAN:  Switch over to p.o. prednisone.  He was started on Dobutrex as per

Cardiology.  I tried low-dose steroids. 



Eventually placement.







Job ID:  [id number]

## 2021-02-11 NOTE — PDOC.CPN
- Subjective


Date: 02/11/21


Time: 13:10


Interval history: 





No overnight events, patient awake & alert, denies any complaints today.





- Review of Systems


General: denies: fever/chills, weight/appetite/sleep changes, night sweats, 

fatigue


Respiratory: denies: cough, congestion, shortness of breath, exercise 

intolerance


Cardiovascular: denies: chest pain, palpitation, edema, paroxysmal nocturnal 

dyspnea, orthopnea


Gastrointestinal: denies: nausea, vomiting, diarrhea, constipation, abd pain, GI

bleeding


Musculoskeletal: denies: pain, tenderness, stiffness, swelling, 

arthritis/arthralgias


Neurological: denies: numbness, syncope, seizure, weakness





- Objective


Allergies/Adverse Reactions: 


                                    Allergies











Allergy/AdvReac Type Severity Reaction Status Date / Time


 


amitriptyline Allergy  Confusion Verified 11/05/20 06:25


 


hydrochlorothiazide Allergy  Hives Verified 11/04/20 22:58


 


valsartan Allergy  Hives Verified 11/04/20 22:58


 


ACE Inhibitors AdvReac  Hives Verified 11/04/20 22:58











Visit Medications: 


                               Current Medications





Acetaminophen (Acetaminophen 325 Mg Tab)  650 mg PO Q4H PRN


   PRN Reason: Headache/Fever/Mild Pain (1-3)


   Last Admin: 02/02/21 08:46 Dose:  650 mg


   Documented by: 


Hydrocodone Bitart/Acetaminophen (Hydrocodone/Acetaminophen 5/325 Mg Tablet)  1 

tab PO Q4H PRN


   PRN Reason: Moderate Pain (4-6)


Albuterol/Ipratropium (Ipratropium/Albuterol Sulfate 3 Ml Neb)  3 ml NEB [id number]

PRN


   PRN Reason: SOB &/or Wheezing


Apixaban (Apixaban 5 Mg Tab)  5 mg PO BID [facility]


   Last Admin: 02/11/21 08:45 Dose:  5 mg


   Documented by: 


Ascorbic Acid (Ascorbic Acid 500 Mg Chewable Tablet)  1,000 mg PO DAILY [facility]


   Last Admin: 02/11/21 09:01 Dose:  1,000 mg


   Documented by: 


Aspirin (Aspirin Chewable 81 Mg Tab)  81 mg PO HS [facility]


   Last Admin: 02/10/21 20:03 Dose:  81 mg


   Documented by: 


Benzonatate (Benzonatate 100 Mg Cap)  100 mg PO Q6H PRN


   PRN Reason: Cough


Bisacodyl (Bisacodyl 5 Mg Tab)  5 mg PO DAILY PRN


   PRN Reason: Constipation


Bisacodyl (Bisacodyl 10 Mg Supp)  10 mg [place] DAILYPRN PRN


   PRN Reason: Constipation


Calcium Carbonate (Calcium Carbonate 500 Mg Chewtab)  1,000 mg PO Q4H PRN


   PRN Reason: Heartburn  or Indigestion


Colchicine (Colchicine 0.6 Mg Tab)  0.6 mg PO BID [facility]


   Last Admin: 02/11/21 08:45 Dose:  0.6 mg


   Documented by: 


Cyanocobalamin (Cyanocobalamin (Vitamin B-12) 1,000 Mcg Tab)  1,000 mcg PO HS 

[facility]


   Last Admin: 02/10/21 20:03 Dose:  1,000 mcg


   Documented by: 


Docusate Sodium (Docusate 100 Mg Cap)  100 mg PO DAILY PRN


   PRN Reason: Constipation


Ferrous Sulfate (Ferrous Sulfate 325 Mg Tab)  325 mg PO QA-[facility]


   Last Admin: 02/11/21 08:45 Dose:  325 mg


   Documented by: 


Folic Acid (Folic Acid 1 Mg Tab)  1 mg PO DAILY [facility]


   Last Admin: 02/11/21 08:45 Dose:  1 mg


   Documented by: 


Furosemide (Furosemide 20 Mg Tab)  20 mg PO DAILY [facility]


   Last Admin: 02/11/21 08:45 Dose:  20 mg


   Documented by: 


Guaifenesin (Guaifenesin Sf Soln 200 Mg/10 Ml Udcup)  200 mg PO Q4H PRN


   PRN Reason: Cough


Guaifenesin/Dextromethorphan (Guaifenesin Dm 100-10/5 Ml Udcup)  15 ml PO Q4H 

PRN


   PRN Reason: Cough


   Last Admin: 01/29/21 11:37 Dose:  15 ml


   Documented by: 


Hydralazine HCl (Hydralazine 20 Mg/Ml Vial)  10 mg SLOW IVP Q4H PRN


   PRN Reason: SBP > 180 and HR < 70


Promethazine HCl 12.5 mg/ (Sodium Chloride)  50.5 mls @ 202 mls/hr IVPB Q6H PRN


   PRN Reason: Nausea/vomiting use second


Meropenem 1 gm/ Device  50 mls @ 100 mls/hr IVPB 0200,1000,1800 [facility]


   Last Admin: 02/11/21 09:10 Dose:  50 mls


   Documented by: 


Doxycycline Hyclate 100 mg/ (Sodium Chloride)  100 mls @ 100 mls/hr IVPB Q12HR 

[facility]


   Stop: 02/19/21 21:01


   Last Admin: 02/11/21 08:47 Dose:  100 mls


   Documented by: 


Dopamine HCl/Dextrose (Dopamine 400 Mg/D5w 250 Ml)  250 mls @ 15.844 mls/hr IVPB

INF [facility]


Iron/Minerals/Multivitamins (Multivitamin W/ Minerals 1 Tab)  1 tab PO DAILY [facility]


   Last Admin: 02/11/21 08:45 Dose:  1 tab


   Documented by: 


Loperamide HCl (Loperamide Hcl 2 Mg Cap)  2 mg PO PRN PRN


   PRN Reason: Diarrhea/Loose Stools


Loratadine (Loratadine 10 Mg Tab)  10 mg PO DAILYPRN PRN


   PRN Reason: Sinus Symptoms


Melatonin (Melatonin 3 Mg Tab)  3 mg PO HS PRN


   PRN Reason: Insomnia


Mometasone Furoate/Formoterol Fumar (Mometasone 200 Mcg/Formoterol 5 Mcg 120 

Puff Inhaler)  2 puff INH BID-RT [facility]


Ondansetron HCl (Ondansetron Pf 4 Mg/2 Ml Vial)  4 mg IVP Q6H PRN


   PRN Reason: Nausea/Vomiting use 1st


Ondansetron HCl (Ondansetron Odt 4 Mg Tab)  4 mg PO Q6H PRN


   PRN Reason: Nausea/Vomiting


Pantoprazole Sodium (Pantoprazole 40 Mg Tab)  40 mg PO Q12HR [facility]


   Last Admin: 02/11/21 09:02 Dose:  40 mg


   Documented by: 


Phenol (Chloraseptic Spray 180 Ml Bottle)  0 ml PO Q1H PRN


   PRN Reason: SORE THROAT


   Last Admin: 02/08/21 16:22 Dose:  2 spr


   Documented by: 


Potassium Chloride (Potassium Chloride 8 Meq Tab)  16 meq PO QAM-[facility]


   Last Admin: 02/11/21 08:45 Dose:  16 meq


   Documented by: 


Prednisone (Prednisone 20 Mg Tab)  20 mg PO QA-[facility]


Saccharomyces Boulardii (Saccharomyces Boulardii 250 Mg Cap)  250 mg PO DAILY 

[facility]


   Last Admin: 02/11/21 09:01 Dose:  250 mg


   Documented by: 


Senna/Docusate Sodium (Senokot S 8.6-50 Mg Tab)  2 tab PO BID PRN


   PRN Reason: Constipation


Sodium Chloride (Flush - Normal Saline 10 Ml Syringe)  10 ml IVF Q12HR [facility]


   Last Admin: 02/11/21 09:45 Dose:  10 ml


   Documented by: 


Sodium Chloride (Flush - Normal Saline 10 Ml Syringe)  10 ml IVF PRN PRN


   PRN Reason: Saline Flush


   Last Admin: 02/06/21 02:11 Dose:  10 ml


   Documented by: 


Sodium Chloride (Sodium Chloride 0.65% Nasal 44 Ml Bot)  0 ml EA NARE QIDPRN PRN


   PRN Reason: Nasal Congestion


Throat Lozenges (Cepastat Lozenges 1 Sylvia)  1 sylvia PO Q2H PRN


   PRN Reason: Sore Throat


Zinc Sulfate (Zinc Sulfate 220 Mg Cap)  220 mg PO DAILY [facility]


   Last Admin: 02/11/21 08:45 Dose:  220 mg


   Documented by: 








Vital Signs & Weight: 


                                   Vital Signs











  Temp Pulse Resp BP Pulse Ox


 


 02/11/21 08:33  97.7 F  107 H  16  140/79  100


 


 02/11/21 08:00      100


 


 02/11/21 07:00      100


 


 02/11/21 06:00   116 H  18  124/69 


 


 02/11/21 04:00   98  20  141/73 H 


 


 02/11/21 02:00   97  24 H  125/73 








                                        











Admit Weight                   201 lb


 


Weight                         186 lb 4.65 oz

















- Quality Measures


Condition: Atrial Fibrillation/Flutter (hx or current), Heart Failure


CV meds: Beta Blocker: No (on hold d/t hypotension ), ACE/ARB: No (Hypotension 

), ASA: Yes, Anticoagulant: Yes (Eliquis)





- Medication Contraindications


No ACE/ARB reason: Medical contraindication


No Anticoagulant reason: Medical contraindication (r/o GI bleed)





- Physical Exam


General: alert & oriented x3, appears well, no apparent distress


Neck: no bruit


Cardiac: no murmur, tachycardia


Lungs: decreased breath sounds, oxygen


Neuro: numbness, other (numbness to BLE remains unchanged)


Abdomen: active bowel sounds, soft


Extremities: other: (cyanotic toes)


Skin: clear, cool, other (scattered bruising throughout bilateral arms)


Musculoskeletal: no pain





- Labs


Result Diagrams: 


                                 02/11/21 04:30





                                 02/11/21 04:30


                                  Troponin/CKMB











Troponin I  0.051 ng/mL (< 0.028)  H  02/07/21  05:15    














- EKG Interpretation


EKG Method: Telemetry


EKG shows: tachycardia ('s)





- Assessment/Plan


Assessment/Plan: 





1. Atrial fibrillation: He is a CHADSVASC score of a 6. He remains MAT -

120's. His Coreg has been on hold at this time d/t hypotension.  His BP have 

been more stable over the last 24 hours. Will add low-dose Corlanor 2.5 mg BID 

for HR control. 


2. COVID-19 Pneumonia: on hi-flow nasal cannula, treated by primary care 

services


3. Cardiomyopathy: EF 40-45% (August 2020) his BP has been stable for the last 

24 hrs, will decrease Dopamine drip and continue to monitor.  


4. Metastatic Prostate Cancer


5. UTI with gram negative rods: treated by primary care services


6. Bilateral lower extremity edema: His BLE edema is much improved. 


7. Bilateral DVT: Venous duplex revealed bilateral non-occlusive thrombus 

involving the deep venous system of each lower extremity, he has refused an IVC 

filter, he has been placed on PO Eliquis. 


8. Blood in stool: no more blood in stool or diarrhea per patient report





We will continue to follow the patient and continue symptomatic treatment at 

this time. 





Pt. seen and eval. by me. I agree with the A/P by the NP. He says he feels a lot

better today. I will stop the dopamine and see how the BP toplerates. He will 

need placemaent  after d/c.

## 2021-02-12 LAB
ANION GAP SERPL CALC-SCNC: 12 MMOL/L (ref 10–20)
CALCIUM SERPL-MCNC: 7.2 MG/DL (ref 7.8–10.44)
CHLORIDE SERPL-SCNC: 104 MMOL/L (ref 98–107)
CO2 SERPL-SCNC: 27 MMOL/L (ref 23–31)
MAGNESIUM SERPL-MCNC: 1.7 MG/DL (ref 1.6–2.6)
POTASSIUM SERPL-SCNC: 3 MMOL/L (ref 3.5–5.1)
SODIUM SERPL-SCNC: 140 MMOL/L (ref 136–145)

## 2021-02-12 RX ADMIN — Medication SCH ML: at 09:09

## 2021-02-12 RX ADMIN — ALBUMIN HUMAN SCH GM: 250 SOLUTION INTRAVENOUS at 18:32

## 2021-02-12 RX ADMIN — MEROPENEM AND SODIUM CHLORIDE SCH MLS: 1 INJECTION, SOLUTION INTRAVENOUS at 03:15

## 2021-02-12 RX ADMIN — ASPIRIN 81 MG CHEWABLE TABLET SCH MG: 81 TABLET CHEWABLE at 20:04

## 2021-02-12 RX ADMIN — CYANOCOBALAMIN TAB 1000 MCG SCH MCG: 1000 TAB at 20:04

## 2021-02-12 RX ADMIN — Medication SCH ML: at 20:06

## 2021-02-12 RX ADMIN — MULTIPLE VITAMINS W/ MINERALS TAB SCH TAB: TAB at 09:09

## 2021-02-12 RX ADMIN — Medication SCH MG: at 09:19

## 2021-02-12 NOTE — PDOC.HOSPP
- Subjective


Encounter Date: 02/12/21


Encounter Time: 08:30


Subjective: 


Patient is doing much better, he has no complaint, his blood pressure runs low 

but he is asymptomatic, now he is off dopamine drip, he is on 4 to 6 L of oxygen





- Objective


Vital Signs & Weight: 


                             Vital Signs (12 hours)











  Temp Pulse Resp BP Pulse Ox


 


 02/12/21 10:00   85   91/51 L  100


 


 02/12/21 08:05      100


 


 02/12/21 08:00  96.4 F L  79  20  116/63  100


 


 02/12/21 06:00   84  20  87/50 L  100


 


 02/12/21 04:00   71  21 H  78/49 L  99


 


 02/12/21 03:39  97.9 F  76  20  78/50 L  98


 


 02/12/21 02:00   64  21 H  84/51 L  100








                                     Weight











Admit Weight                   201 lb


 


Weight                         186 lb 4.65 oz














I&O: 


                                        











 02/11/21 02/12/21 02/13/21





 06:59 06:59 06:59


 


Intake Total 663 1063 


 


Output Total 575 930 


 


Balance 88 133 











Result Diagrams: 


                                 02/11/21 04:30





                                 02/12/21 00:08


EKG Reviewed by me: Yes





Hospitalist ROS





- Review of Systems


Constitutional: reports: weakness, malaise


ENT: denies: ear pain, ear discharge, nose pain, nose discharge, nose 

congestion, mouth pain, mouth swelling, throat pain, throat swelling, other


Respiratory: reports: cough, shortness of breath, SOB with excertion.  denies: 

dry, hemoptysis, pleuritic pain, sputum, wheezing, other


Cardiovascular: denies: chest pain, palpitations, orthopnea, paroxysmal noc. 

dyspnea, edema, light headedness, other


Gastrointestinal: denies: nausea, vomiting, abdominal pain, diarrhea, 

constipation, melena, hematochezia, other


Genitourinary: denies: dysuria, frequency, incontinence, hematuria, retention, 

other


Musculoskeletal: denies: neck pain, shoulder pain, arm pain, back pain, hand 

pain, leg pain, foot pain, other





- Medication


Medications: 


Active Medications











Generic Name Dose Route Start Last Admin





  Trade Name Freq  PRN Reason Stop Dose Admin


 


Acetaminophen  650 mg  01/29/21 03:46  02/02/21 08:46





  Acetaminophen 325 Mg Tab  PO   650 mg





  Q4H PRN   Administration





  Headache/Fever/Mild Pain (1-3)  


 


Apixaban  5 mg  02/09/21 21:00  02/12/21 09:03





  Apixaban 5 Mg Tab  PO   5 mg





  BID [facility]   Administration


 


Ascorbic Acid  1,000 mg  01/29/21 09:00  02/12/21 09:19





  Ascorbic Acid 500 Mg Chewable Tablet  PO   1,000 mg





  DAILY [facility]   Administration


 


Aspirin  81 mg  01/29/21 21:00  02/11/21 21:25





  Aspirin Chewable 81 Mg Tab  PO   81 mg





  HS [facility]   Administration


 


Colchicine  0.6 mg  01/29/21 09:00  02/12/21 09:03





  Colchicine 0.6 Mg Tab  PO   0.6 mg





  BID [facility]   Administration


 


Cyanocobalamin  1,000 mcg  02/10/21 21:00  02/11/21 21:25





  Cyanocobalamin (Vitamin B-12) 1,000 Mcg Tab  PO   1,000 mcg





  HS [facility]   Administration


 


Ferrous Sulfate  325 mg  02/11/21 08:00  02/12/21 09:03





  Ferrous Sulfate 325 Mg Tab  PO   325 mg





  QAM-WM [facility]   Administration


 


Folic Acid  1 mg  02/11/21 09:00  02/12/21 09:04





  Folic Acid 1 Mg Tab  PO   1 mg





  DAILY [facility]   Administration


 


Furosemide  20 mg  02/11/21 09:00  02/12/21 09:04





  Furosemide 20 Mg Tab  PO   20 mg





  DAILY [facility]   Administration


 


Guaifenesin/Dextromethorphan  15 ml  01/29/21 03:46  01/29/21 11:37





  Guaifenesin Dm 100-10/5 Ml Udcup  PO   15 ml





  Q4H PRN   Administration





  Cough  


 


Iron/Minerals/Multivitamins  1 tab  02/11/21 09:00  02/12/21 09:09





  Multivitamin W/ Minerals 1 Tab  PO   1 tab





  DAILY [facility]   Administration


 


Ivabradine  2.5 mg  02/11/21 21:00  02/12/21 09:05





  Ivabradine 5 Mg Tab  PO   2.5 mg





  BID [facility]   Administration


 


Midodrine  5 mg  02/12/21 09:00  02/12/21 09:45





  Midodrine Hcl 5 Mg Tab  PO   5 mg





  TID [facility]   Administration


 


Pantoprazole Sodium  40 mg  02/11/21 09:00  02/12/21 09:03





  Pantoprazole 40 Mg Tab  PO   40 mg





  Q12HR [facility]   Administration


 


Phenol  0 ml  02/08/21 15:46  02/08/21 16:22





  Chloraseptic Spray 180 Ml Bottle  PO   2 spr





  Q1H PRN   Administration





  SORE THROAT  


 


Potassium Chloride  16 meq  02/04/21 08:00  02/12/21 09:05





  Potassium Chloride 8 Meq Tab  PO   16 meq





  QAM-WM [facility]   Administration


 


Prednisone  20 mg  02/12/21 08:00  02/12/21 09:05





  Prednisone 20 Mg Tab  PO   20 mg





  QAM-WM [facility]   Administration


 


Saccharomyces Boulardii  250 mg  02/11/21 09:00  02/12/21 09:04





  Saccharomyces Boulardii 250 Mg Cap  PO   250 mg





  DAILY [facility]   Administration


 


Sodium Chloride  10 ml  02/01/21 21:00  02/12/21 09:09





  Flush - Normal Saline 10 Ml Syringe  IVF   10 ml





  Q12HR [facility]   Administration


 


Sodium Chloride  10 ml  02/01/21 13:30  02/06/21 02:11





  Flush - Normal Saline 10 Ml Syringe  IVF   10 ml





  PRN PRN   Administration





  Saline Flush  


 


Zinc Sulfate  220 mg  01/29/21 09:00  02/12/21 09:04





  Zinc Sulfate 220 Mg Cap  PO   220 mg





  DAILY [facility]   Administration














Hospitalist Exam


Vitals: 


                             Vital Signs (12 hours)











  Temp Pulse Resp BP Pulse Ox


 


 02/12/21 10:00   85   91/51 L  100


 


 02/12/21 08:05      100


 


 02/12/21 08:00  96.4 F L  79  20  116/63  100


 


 02/12/21 06:00   84  20  87/50 L  100


 


 02/12/21 04:00   71  21 H  78/49 L  99


 


 02/12/21 03:39  97.9 F  76  20  78/50 L  98


 


 02/12/21 02:00   64  21 H  84/51 L  100








                                     Weight











Admit Weight                   201 lb


 


Weight                         186 lb 4.65 oz














General Appearance: NAD, awake alert


Eye: PERRL, anicteric sclera


ENT: normocephalic atraumatic, no oropharyngeal lesions


Neck: supple, symmetric, no JVD, no thyromegaly


Heart: no murmur, no gallops, no rubs, irregular


Respiratory: no wheezes, no rales, no ronchi


Gastrointestinal: soft, non-tender, non-distended, normal bowel sounds


Extremities: 1+ LE edema


Skin: normal turgor, no lesions


Neurological - other findings: Bilateral lower extremity weakness


Musculoskeletal: generalized weakness


Psychiatric: normal affect, normal behavior





Hosp A/P


(1) Acute respiratory failure due to COVID-19


Code(s): U07.1 - COVID-19; J96.00 - ACUTE RESPIRATORY FAILURE, UNSP W HYPOXIA OR

HYPERCAPNIA   Status: Acute   





(2) Pneumonia due to 2019 novel coronavirus


Code(s): U07.1 - COVID-19; J12.82 - PNEUMONIA DUE TO CORONAVIRUS DISEASE 2019   

Status: Acute   





(3) DVT (deep venous thrombosis)


Code(s): I82.409 - ACUTE EMBOLISM AND THOMBOS UNSP DEEP VN UNSP LOWER EXTREMITY 

 Status: Acute   


Qualifiers: 


   DVT location: lower extremity   Chronicity: acute   Laterality: bilateral 





(4) Urinary tract infection


Status: Acute   


Qualifiers: 


   Urinary tract infection type: acute cystitis   Hematuria presence: without 

hematuria   Qualified Code(s): N30.00 - Acute cystitis without hematuria   





(5) KENAN (acute kidney injury)


Code(s): N17.9 - ACUTE KIDNEY FAILURE, UNSPECIFIED   Status: Acute   





(6) Acute on chronic systolic (congestive) heart failure


Code(s): I50.23 - ACUTE ON CHRONIC SYSTOLIC (CONGESTIVE) HEART FAILURE   Status:

Acute   





(7) Atrial fibrillation with rapid ventricular response


Code(s): I48.91 - UNSPECIFIED ATRIAL FIBRILLATION   Status: Acute   





(8) CKD (chronic kidney disease), stage III


Code(s): N18.3 - CHRONIC KIDNEY DISEASE, STAGE 3 (MODERATE) * DO NOT USE *   

Status: Chronic   


Qualifiers: 


   Chronic kidney disease stage 3 subtype: stage 3a (GFR 45-59)   Qualified 

Code(s): N18.31 - Chronic kidney disease, stage 3a   





(9) HTN (hypertension)


Code(s): I10 - ESSENTIAL (PRIMARY) HYPERTENSION   Status: Chronic   


Qualifiers: 


   Hypertension type: essential hypertension   Qualified Code(s): I10 - 

Essential (primary) hypertension   





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


Code(s): C61 - MALIGNANT NEOPLASM OF PROSTATE   Status: Chronic   





(11) Spinal stenosis of lumbar region


Code(s): M48.061 - SPINAL STENOSIS, LUMBAR REGION WITHOUT NEUROGENIC CASA   

Status: Chronic   


Qualifiers: 


   Neurogenic claudication status: unspecified   Qualified Code(s): M48.061 - 

Spinal stenosis, lumbar region without neurogenic claudication   





(12) GI bleed


Code(s): K92.2 - GASTROINTESTINAL HEMORRHAGE, UNSPECIFIED   Status: Resolved   





- Plan


old records reviewed/req, plan discussed w/ family, PT/OT





Discontinue ethics consult


I spoke with the patient's wife and updated about plan of care


Discontinue meropenem as patient has completed antibiotic therapy for 

Pseudomonas UTI,


Medication reviewed and continue provide symptomatic and supportive care


Add midodrine 5 mg 3 times daily for low blood pressure


Patient will need placement,


Social work following

## 2021-02-12 NOTE — PDOC.CPN
- Subjective


Date: 02/12/21


Time: 14:31


Interval history: 


Patient sitting up in bed, alert and oriented today, he states he feels very 

good today. He denies any complaints today.





- Review of Systems


General: denies: fever/chills, weight/appetite/sleep changes, night sweats, 

fatigue


Respiratory: denies: cough, congestion, shortness of breath, exercise 

intolerance


Cardiovascular: denies: chest pain, palpitation, edema, paroxysmal nocturnal 

dyspnea, orthopnea


Gastrointestinal: denies: nausea, vomiting, diarrhea, constipation, abd pain, GI

bleeding


Musculoskeletal: denies: pain, tenderness, stiffness, swelling, 

arthritis/arthralgias


Neurological: denies: numbness, syncope, seizure, weakness





- Objective


Allergies/Adverse Reactions: 


                                    Allergies











Allergy/AdvReac Type Severity Reaction Status Date / Time


 


amitriptyline Allergy  Confusion Verified 11/05/20 06:25


 


hydrochlorothiazide Allergy  Hives Verified 11/04/20 22:58


 


valsartan Allergy  Hives Verified 11/04/20 22:58


 


ACE Inhibitors AdvReac  Hives Verified 11/04/20 22:58











Visit Medications: 


                               Current Medications





Acetaminophen (Acetaminophen 325 Mg Tab)  650 mg PO Q4H PRN


   PRN Reason: Headache/Fever/Mild Pain (1-3)


   Last Admin: 02/02/21 08:46 Dose:  650 mg


   Documented by: 


Hydrocodone Bitart/Acetaminophen (Hydrocodone/Acetaminophen 5/325 Mg Tablet)  1 

tab PO Q4H PRN


   PRN Reason: Moderate Pain (4-6)


Albuterol/Ipratropium (Ipratropium/Albuterol Sulfate 3 Ml Neb)  3 ml NEB [id number]

PRN


   PRN Reason: SOB &/or Wheezing


Apixaban (Apixaban 5 Mg Tab)  5 mg PO BID [facility]


   Last Admin: 02/12/21 09:03 Dose:  5 mg


   Documented by: 


Ascorbic Acid (Ascorbic Acid 500 Mg Chewable Tablet)  1,000 mg PO DAILY [facility]


   Last Admin: 02/12/21 09:19 Dose:  1,000 mg


   Documented by: 


Aspirin (Aspirin Chewable 81 Mg Tab)  81 mg PO HS [facility]


   Last Admin: 02/11/21 21:25 Dose:  81 mg


   Documented by: 


Benzonatate (Benzonatate 100 Mg Cap)  100 mg PO Q6H PRN


   PRN Reason: Cough


Bisacodyl (Bisacodyl 5 Mg Tab)  5 mg PO DAILY PRN


   PRN Reason: Constipation


Bisacodyl (Bisacodyl 10 Mg Supp)  10 mg CO DAILYPRN PRN


   PRN Reason: Constipation


Calcium Carbonate (Calcium Carbonate 500 Mg Chewtab)  1,000 mg PO Q4H PRN


   PRN Reason: Heartburn  or Indigestion


Colchicine (Colchicine 0.6 Mg Tab)  0.6 mg PO BID [facility]


   Last Admin: 02/12/21 09:03 Dose:  0.6 mg


   Documented by: 


Cyanocobalamin (Cyanocobalamin (Vitamin B-12) 1,000 Mcg Tab)  1,000 mcg PO [facility]


   Last Admin: 02/11/21 21:25 Dose:  1,000 mcg


   Documented by: 


Docusate Sodium (Docusate 100 Mg Cap)  100 mg PO DAILY PRN


   PRN Reason: Constipation


Ferrous Sulfate (Ferrous Sulfate 325 Mg Tab)  325 mg PO [facility]


   Last Admin: 02/12/21 09:03 Dose:  325 mg


   Documented by: 


Folic Acid (Folic Acid 1 Mg Tab)  1 mg PO DAILY [facility]


   Last Admin: 02/12/21 09:04 Dose:  1 mg


   Documented by: 


Furosemide (Furosemide 20 Mg Tab)  20 mg PO DAILY [facility]


   Last Admin: 02/12/21 09:04 Dose:  20 mg


   Documented by: 


Guaifenesin (Guaifenesin Sf Soln 200 Mg/10 Ml Udcup)  200 mg PO Q4H PRN


   PRN Reason: Cough


Guaifenesin/Dextromethorphan (Guaifenesin Dm 100-10/5 Ml Udcup)  15 ml PO Q4H 

PRN


   PRN Reason: Cough


   Last Admin: 01/29/21 11:37 Dose:  15 ml


   Documented by: 


Hydralazine HCl (Hydralazine 20 Mg/Ml Vial)  10 mg SLOW IVP Q4H PRN


   PRN Reason: SBP > 180 and HR < 70


Promethazine HCl 12.5 mg/ (Sodium Chloride)  50.5 mls @ 202 mls/hr IVPB Q6H PRN


   PRN Reason: Nausea/vomiting use second


Iron/Minerals/Multivitamins (Multivitamin W/ Minerals 1 Tab)  1 tab PO DAILY [facility]


   Last Admin: 02/12/21 09:09 Dose:  1 tab


   Documented by: 


Ivabradine (Ivabradine 5 Mg Tab)  2.5 mg PO BID [facility]


   Last Admin: 02/12/21 09:05 Dose:  2.5 mg


   Documented by: 


Loperamide HCl (Loperamide Hcl 2 Mg Cap)  2 mg PO PRN PRN


   PRN Reason: Diarrhea/Loose Stools


Loratadine (Loratadine 10 Mg Tab)  10 mg PO DAILYPRN PRN


   PRN Reason: Sinus Symptoms


Melatonin (Melatonin 3 Mg Tab)  3 mg PO HS PRN


   PRN Reason: Insomnia


Midodrine (Midodrine Hcl 5 Mg Tab)  5 mg PO TID [facility]


   Last Admin: 02/12/21 09:45 Dose:  5 mg


   Documented by: 


Mometasone Furoate/Formoterol Fumar (Mometasone 200 Mcg/Formoterol 5 Mcg 120 

Puff Inhaler)  2 puff INH BID-RT [facility]


Ondansetron HCl (Ondansetron Pf 4 Mg/2 Ml Vial)  4 mg IVP Q6H PRN


   PRN Reason: Nausea/Vomiting use 1st


Ondansetron HCl (Ondansetron Odt 4 Mg Tab)  4 mg PO Q6H PRN


   PRN Reason: Nausea/Vomiting


Pantoprazole Sodium (Pantoprazole 40 Mg Tab)  40 mg PO Q12HR [facility]


   Last Admin: 02/12/21 09:03 Dose:  40 mg


   Documented by: 


Phenol (Chloraseptic Spray 180 Ml Bottle)  0 ml PO Q1H PRN


   PRN Reason: SORE THROAT


   Last Admin: 02/08/21 16:22 Dose:  2 spr


   Documented by: 


Potassium Chloride (Potassium Chloride 8 Meq Tab)  16 meq PO [facility]


   Last Admin: 02/12/21 09:05 Dose:  16 meq


   Documented by: 


Prednisone (Prednisone 20 Mg Tab)  20 mg PO [facility]


   Last Admin: 02/12/21 09:05 Dose:  20 mg


   Documented by: 


Saccharomyces Boulardii (Saccharomyces Boulardii 250 Mg Cap)  250 mg PO DAILY 

[facility]


   Last Admin: 02/12/21 09:04 Dose:  250 mg


   Documented by: 


Senna/Docusate Sodium (Senokot S 8.6-50 Mg Tab)  2 tab PO BID PRN


   PRN Reason: Constipation


Sodium Chloride (Flush - Normal Saline 10 Ml Syringe)  10 ml IVF Q12HR [facility]


   Last Admin: 02/12/21 09:09 Dose:  10 ml


   Documented by: 


Sodium Chloride (Flush - Normal Saline 10 Ml Syringe)  10 ml IVF PRN PRN


   PRN Reason: Saline Flush


   Last Admin: 02/06/21 02:11 Dose:  10 ml


   Documented by: 


Sodium Chloride (Sodium Chloride 0.65% Nasal 44 Ml Bot)  0 ml EA NARE QIDPRN PRN


   PRN Reason: Nasal Congestion


Throat Lozenges (Cepastat Lozenges 1 Sylvia)  1 sylvia PO Q2H PRN


   PRN Reason: Sore Throat


Zinc Sulfate (Zinc Sulfate 220 Mg Cap)  220 mg PO DAILY [facility]


   Last Admin: 02/12/21 09:04 Dose:  220 mg


   Documented by: 








Vital Signs & Weight: 


                                   Vital Signs











  Temp Pulse Resp BP Pulse Ox


 


 02/12/21 10:00   85   91/51 L  100


 


 02/12/21 08:05      100


 


 02/12/21 08:00  96.4 F L  79  20  116/63  100


 


 02/12/21 06:00   84  20  87/50 L  100


 


 02/12/21 04:00   71  21 H  78/49 L  99


 


 02/12/21 03:39  97.9 F  76  20  78/50 L  98








                                        











Admit Weight                   201 lb


 


Weight                         186 lb 4.65 oz

















- Quality Measures


Condition: Atrial Fibrillation/Flutter (hx or current), Heart Failure


CV meds: Beta Blocker: No (on hold d/t hypotension ), ACE/ARB: No (Hypotension 

), ASA: Yes, Anticoagulant: Yes (Eliquis)





- Medication Contraindications


No ACE/ARB reason: Medical contraindication


No Anticoagulant reason: Medical contraindication (r/o GI bleed)





- Physical Exam


General: alert & oriented x3, appears well, no apparent distress


HEENT: mucus membranes moist


Neck: no bruit


Cardiac: no murmur, tachycardia


Lungs: no wheeze, rales, rhonchi, decreased breath sounds, oxygen


Neuro: numbness


Abdomen: active bowel sounds, soft, non-tender


Extremities: no clubbing, other: (1+ edema to bilateral feet, his BLE are much 

warmer today and his toes are no longer cyanotic)


Skin: brusing, other (thin, fragile skin, scattered bruising throughout 

bilateral arms)


Musculoskeletal: no pain





- Labs


Result Diagrams: 


                                 02/11/21 04:30





                                 02/12/21 00:08


                                  Troponin/CKMB











Troponin I  0.051 ng/mL (< 0.028)  H  02/07/21  05:15    














- EKG Interpretation


EKG Method: Telemetry


EKG shows: tachycardia (continued 's)





- Assessment/Plan


Assessment/Plan: 





1. Atrial fibrillation: He is a CHADSVASC score of a 6. He remains MAT HR 80's-

100's. His HR has been under better control since starting Corlanor yesterday, 

we will continue this. He is also on Eliquis


2. COVID-19 Pneumonia: he is on nasal cannulatoday, he states his breathing 

feels much better


3. Cardiomyopathy: EF 40-45% (August 2020) his BP has been stable for the last 

24 hrs, Dopamine drip was stopped yesterday, he has still been hypotensive, he 

is asymptomatic. Midodrine has been added TID for hypotension, we will continue 

to monitor his BP  


4. Metastatic Prostate Cancer


5. UTI with gram negative rods: treated by primary care services


6. Bilateral lower extremity edema: His BLE edema is much improved. 


7. Bilateral DVT: Venous duplex revealed bilateral non-occlusive thrombus 

involving the deep venous system of each lower extremity, he has refused an IVC 

filter, he has been placed on PO Eliquis. His toes are no longer cyanotic today.




8. Blood in stool: no more blood in stool or diarrhea per patient report, denies

any abdominal complaints today, Hgb stable





We will continue to follow the patient and continue symptomatic treatment at 

this time. 








Pt. seen and eval. by me. I agree with the a/P by the NP. He is still edematous 

and is third spacing. The renalfunction indicates prerenal picture. I will add 

albumin today and in AM. He is intravascularly vol. depleted. He feels better 

the bp is improved in the prone position. RRR, chest clear. 2+ edema. 


prob. d/c in next couple days. [email or web address]

## 2021-02-13 LAB — HGB BLD-MCNC: 8.5 G/DL (ref 14–18)

## 2021-02-13 RX ADMIN — CYANOCOBALAMIN TAB 1000 MCG SCH MCG: 1000 TAB at 20:56

## 2021-02-13 RX ADMIN — ASPIRIN 81 MG CHEWABLE TABLET SCH MG: 81 TABLET CHEWABLE at 20:53

## 2021-02-13 RX ADMIN — MULTIPLE VITAMINS W/ MINERALS TAB SCH TAB: TAB at 08:02

## 2021-02-13 RX ADMIN — Medication SCH ML: at 08:08

## 2021-02-13 RX ADMIN — Medication SCH MG: at 08:01

## 2021-02-13 RX ADMIN — Medication SCH ML: at 20:54

## 2021-02-13 RX ADMIN — ALBUMIN HUMAN SCH GM: 250 SOLUTION INTRAVENOUS at 08:03

## 2021-02-13 NOTE — PDOC.HOSPP
- Subjective


Encounter Date: 02/13/21


Encounter Time: 08:05


Subjective: 


Patient seen and examined. No new complaints. No overnight events





- Objective


Vital Signs & Weight: 


                             Vital Signs (12 hours)











  Temp Pulse Resp BP Pulse Ox


 


 02/13/21 10:40      100


 


 02/13/21 08:55  97.9 F  100  14  87/56 L  100


 


 02/13/21 06:00   86  14  97/54 L  94 L


 


 02/13/21 03:50  97.7 F  91  14  91/54 L  100


 


 02/13/21 02:00   85   104/52 L  100








                                     Weight











Admit Weight                   201 lb


 


Weight                         186 lb 4.65 oz














I&O: 


                                        











 02/12/21 02/13/21 02/14/21





 06:59 06:59 06:59


 


Intake Total 1063 477 


 


Output Total 930 95 


 


Balance 133 382 











Result Diagrams: 


                                 02/11/21 04:30





                                 02/12/21 00:08


EKG Reviewed by me: Yes





Hospitalist ROS





- Review of Systems


Constitutional: reports: weakness, malaise.  denies: fever, chills, sweats, 

other


ENT: denies: ear pain, ear discharge, nose pain, nose discharge, nose 

congestion, mouth pain, mouth swelling, throat pain, throat swelling, other


Respiratory: denies: cough, dry, shortness of breath, hemoptysis, SOB with 

excertion, pleuritic pain, sputum, wheezing, other


Cardiovascular: denies: chest pain, palpitations, orthopnea, paroxysmal noc. 

dyspnea, edema, light headedness, other


Gastrointestinal: denies: nausea, vomiting, abdominal pain, diarrhea, 

constipation, melena, hematochezia, other


Genitourinary: denies: dysuria, frequency, incontinence, hematuria, retention, 

other


Musculoskeletal: denies: neck pain, shoulder pain, arm pain, back pain, hand 

pain, leg pain, foot pain, other





- Medication


Medications: 


Active Medications











Generic Name Dose Route Start Last Admin





  Trade Name Freq  PRN Reason Stop Dose Admin


 


Acetaminophen  650 mg  01/29/21 03:46  02/02/21 08:46





  Acetaminophen 325 Mg Tab  PO   650 mg





  Q4H PRN   Administration





  Headache/Fever/Mild Pain (1-3)  


 


Apixaban  5 mg  02/09/21 21:00  02/13/21 08:01





  Apixaban 5 Mg Tab  PO   5 mg





  BID [facility]   Administration


 


Ascorbic Acid  1,000 mg  01/29/21 09:00  02/13/21 08:01





  Ascorbic Acid 500 Mg Chewable Tablet  PO   1,000 mg





  DAILY [facility]   Administration


 


Aspirin  81 mg  01/29/21 21:00  02/12/21 20:04





  Aspirin Chewable 81 Mg Tab  PO   81 mg





  HS [facility]   Administration


 


Colchicine  0.6 mg  01/29/21 09:00  02/13/21 08:02





  Colchicine 0.6 Mg Tab  PO   0.6 mg





  BID [facility]   Administration


 


Cyanocobalamin  1,000 mcg  02/10/21 21:00  02/12/21 20:04





  Cyanocobalamin (Vitamin B-12) 1,000 Mcg Tab  PO   1,000 mcg





  HS [facility]   Administration


 


Ferrous Sulfate  325 mg  02/11/21 08:00  02/13/21 08:02





  Ferrous Sulfate 325 Mg Tab  PO   325 mg





  QAM-WM [facility]   Administration


 


Folic Acid  1 mg  02/11/21 09:00  02/13/21 08:02





  Folic Acid 1 Mg Tab  PO   1 mg





  DAILY [facility]   Administration


 


Furosemide  20 mg  02/11/21 09:00  02/13/21 08:03





  Furosemide 20 Mg Tab  PO   20 mg





  DAILY [facility]   Administration


 


Guaifenesin/Dextromethorphan  15 ml  01/29/21 03:46  01/29/21 11:37





  Guaifenesin Dm 100-10/5 Ml Udcup  PO   15 ml





  Q4H PRN   Administration





  Cough  


 


Iron/Minerals/Multivitamins  1 tab  02/11/21 09:00  02/13/21 08:02





  Multivitamin W/ Minerals 1 Tab  PO   1 tab





  DAILY [facility]   Administration


 


Ivabradine  2.5 mg  02/11/21 21:00  02/13/21 08:08





  Ivabradine 5 Mg Tab  PO   2.5 mg





  BID [facility]   Administration


 


Midodrine  5 mg  02/12/21 09:00  02/13/21 08:18





  Midodrine Hcl 5 Mg Tab  PO   5 mg





  TID [facility]   Administration


 


Pantoprazole Sodium  40 mg  02/11/21 09:00  02/13/21 08:02





  Pantoprazole 40 Mg Tab  PO   40 mg





  Q12HR [facility]   Administration


 


Phenol  0 ml  02/08/21 15:46  02/08/21 16:22





  Chloraseptic Spray 180 Ml Bottle  PO   2 spr





  Q1H PRN   Administration





  SORE THROAT  


 


Potassium Chloride  16 meq  02/04/21 08:00  02/13/21 08:03





  Potassium Chloride 8 Meq Tab  PO   16 meq





  QAM-WM [facility]   Administration


 


Prednisone  20 mg  02/12/21 08:00  02/13/21 08:01





  Prednisone 20 Mg Tab  PO   20 mg





  QAM-WM [facility]   Administration


 


Saccharomyces Boulardii  250 mg  02/11/21 09:00  02/13/21 08:02





  Saccharomyces Boulardii 250 Mg Cap  PO   250 mg





  DAILY [facility]   Administration


 


Sodium Chloride  10 ml  02/01/21 21:00  02/13/21 08:08





  Flush - Normal Saline 10 Ml Syringe  IVF   10 ml





  Q12HR [facility]   Administration


 


Sodium Chloride  10 ml  02/01/21 13:30  02/06/21 02:11





  Flush - Normal Saline 10 Ml Syringe  IVF   10 ml





  PRN PRN   Administration





  Saline Flush  


 


Zinc Sulfate  220 mg  01/29/21 09:00  02/13/21 08:02





  Zinc Sulfate 220 Mg Cap  PO   220 mg





  DAILY [facility]   Administration














Hospitalist Exam


Vitals: 


                             Vital Signs (12 hours)











  Temp Pulse Resp BP Pulse Ox


 


 02/13/21 10:40      100


 


 02/13/21 08:55  97.9 F  100  14  87/56 L  100


 


 02/13/21 06:00   86  14  97/54 L  94 L


 


 02/13/21 03:50  97.7 F  91  14  91/54 L  100


 


 02/13/21 02:00   85   104/52 L  100








                                     Weight











Admit Weight                   201 lb


 


Weight                         186 lb 4.65 oz














General Appearance: NAD, ill appearing


Eye: PERRL, anicteric sclera


ENT: normocephalic atraumatic, no oropharyngeal lesions


Neck: supple, symmetric, no JVD, no thyromegaly


Heart: no murmur, no gallops, no rubs, irregular


Respiratory: no wheezes, no rales, no ronchi


Respiratory - other findings: Anteriorly clear but air entry reduced at base


Gastrointestinal: soft, non-tender, non-distended, normal bowel sounds


Extremities: no edema, 1+ LE edema


Skin: normal turgor, no lesions


Musculoskeletal: normal tone, normal strength


Psychiatric: normal affect, normal behavior





Hosp A/P


(1) Acute respiratory failure due to COVID-19


Code(s): U07.1 - COVID-19; J96.00 - ACUTE RESPIRATORY FAILURE, UNSP W HYPOXIA OR

HYPERCAPNIA   Status: Acute   





(2) Pneumonia due to 2019 novel coronavirus


Code(s): U07.1 - COVID-19; J12.82 - PNEUMONIA DUE TO CORONAVIRUS DISEASE 2019   

Status: Acute   





(3) DVT (deep venous thrombosis)


Code(s): I82.409 - ACUTE EMBOLISM AND THOMBOS UNSP DEEP VN UNSP LOWER EXTREMITY 

 Status: Acute   


Qualifiers: 


   DVT location: lower extremity   Chronicity: acute   Laterality: bilateral 





(4) Urinary tract infection


Status: Acute   


Qualifiers: 


   Urinary tract infection type: acute cystitis   Hematuria presence: without 

hematuria   Qualified Code(s): N30.00 - Acute cystitis without hematuria   





(5) KENAN (acute kidney injury)


Code(s): N17.9 - ACUTE KIDNEY FAILURE, UNSPECIFIED   Status: Acute   





(6) Acute on chronic systolic (congestive) heart failure


Code(s): I50.23 - ACUTE ON CHRONIC SYSTOLIC (CONGESTIVE) HEART FAILURE   Status:

Acute   





(7) Atrial fibrillation with rapid ventricular response


Code(s): I48.91 - UNSPECIFIED ATRIAL FIBRILLATION   Status: Acute   





(8) CKD (chronic kidney disease), stage III


Code(s): N18.3 - CHRONIC KIDNEY DISEASE, STAGE 3 (MODERATE) * DO NOT USE *   

Status: Chronic   


Qualifiers: 


   Chronic kidney disease stage 3 subtype: stage 3a (GFR 45-59)   Qualified 

Code(s): N18.31 - Chronic kidney disease, stage 3a   





(9) HTN (hypertension)


Code(s): I10 - ESSENTIAL (PRIMARY) HYPERTENSION   Status: Chronic   


Qualifiers: 


   Hypertension type: essential hypertension   Qualified Code(s): I10 - 

Essential (primary) hypertension   





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


Code(s): C61 - MALIGNANT NEOPLASM OF PROSTATE   Status: Chronic   





(11) Spinal stenosis of lumbar region


Code(s): M48.061 - SPINAL STENOSIS, LUMBAR REGION WITHOUT NEUROGENIC CASA   

Status: Chronic   


Qualifiers: 


   Neurogenic claudication status: unspecified   Qualified Code(s): M48.061 - 

Spinal stenosis, lumbar region without neurogenic claudication   





(12) GI bleed


Code(s): K92.2 - GASTROINTESTINAL HEMORRHAGE, UNSPECIFIED   Status: Resolved   





- Plan


old records reviewed/req, PT/OT, 





Patient is getting Albumin, today last dose


Continue Eliquis


Continue vitamin supplementation


Continue: Oral


Continue midodrine


Medication reviewed and continued per symptomatic and supportive care


Eventually this patient will need his placement

## 2021-02-13 NOTE — PDOC.CPN
- Subjective


Date: 02/13/21


Time: 10:47


Interval history: 





No overnight events, patient is sitting up in bed, alert & oriented today, he 

states he feels good. Denies any chest pain, shorntess of breath. 





- Review of Systems


General: denies: fever/chills, weight/appetite/sleep changes, night sweats, 

fatigue


Respiratory: denies: cough, congestion, shortness of breath, exercise 

intolerance


Cardiovascular: denies: chest pain, palpitation, edema, paroxysmal nocturnal 

dyspnea, orthopnea


Gastrointestinal: denies: nausea, vomiting, diarrhea, constipation, abd pain, GI

bleeding


Musculoskeletal: denies: pain, tenderness, stiffness, swelling, 

arthritis/arthralgias


Neurological: denies: numbness, syncope, seizure, weakness





- Objective


Allergies/Adverse Reactions: 


                                    Allergies











Allergy/AdvReac Type Severity Reaction Status Date / Time


 


amitriptyline Allergy  Confusion Verified 11/05/20 06:25


 


hydrochlorothiazide Allergy  Hives Verified 11/04/20 22:58


 


valsartan Allergy  Hives Verified 11/04/20 22:58


 


ACE Inhibitors AdvReac  Hives Verified 11/04/20 22:58











Visit Medications: 


                               Current Medications





Acetaminophen (Acetaminophen 325 Mg Tab)  650 mg PO Q4H PRN


   PRN Reason: Headache/Fever/Mild Pain (1-3)


   Last Admin: 02/02/21 08:46 Dose:  650 mg


   Documented by: 


Hydrocodone Bitart/Acetaminophen (Hydrocodone/Acetaminophen 5/325 Mg Tablet)  1 

tab PO Q4H PRN


   PRN Reason: Moderate Pain (4-6)


Albuterol/Ipratropium (Ipratropium/Albuterol Sulfate 3 Ml Neb)  3 ml NEB [id number]

PRN


   PRN Reason: SOB &/or Wheezing


Apixaban (Apixaban 5 Mg Tab)  5 mg PO BID [facility]


   Last Admin: 02/13/21 08:01 Dose:  5 mg


   Documented by: 


Ascorbic Acid (Ascorbic Acid 500 Mg Chewable Tablet)  1,000 mg PO DAILY [facility]


   Last Admin: 02/13/21 08:01 Dose:  1,000 mg


   Documented by: 


Aspirin (Aspirin Chewable 81 Mg Tab)  81 mg PO HS [facility]


   Last Admin: 02/12/21 20:04 Dose:  81 mg


   Documented by: 


Benzonatate (Benzonatate 100 Mg Cap)  100 mg PO Q6H PRN


   PRN Reason: Cough


Bisacodyl (Bisacodyl 5 Mg Tab)  5 mg PO DAILY PRN


   PRN Reason: Constipation


Bisacodyl (Bisacodyl 10 Mg Supp)  10 mg [place] DAILYPRN PRN


   PRN Reason: Constipation


Calcium Carbonate (Calcium Carbonate 500 Mg Chewtab)  1,000 mg PO Q4H PRN


   PRN Reason: Heartburn  or Indigestion


Colchicine (Colchicine 0.6 Mg Tab)  0.6 mg PO BID [facility]


   Last Admin: 02/13/21 08:02 Dose:  0.6 mg


   Documented by: 


Cyanocobalamin (Cyanocobalamin (Vitamin B-12) 1,000 Mcg Tab)  1,000 mcg PO [facility]


   Last Admin: 02/12/21 20:04 Dose:  1,000 mcg


   Documented by: 


Docusate Sodium (Docusate 100 Mg Cap)  100 mg PO DAILY PRN


   PRN Reason: Constipation


Ferrous Sulfate (Ferrous Sulfate 325 Mg Tab)  325 mg PO [facility]


   Last Admin: 02/13/21 08:02 Dose:  325 mg


   Documented by: 


Folic Acid (Folic Acid 1 Mg Tab)  1 mg PO DAILY [facility]


   Last Admin: 02/13/21 08:02 Dose:  1 mg


   Documented by: 


Furosemide (Furosemide 20 Mg Tab)  20 mg PO DAILY [facility]


   Last Admin: 02/13/21 08:03 Dose:  20 mg


   Documented by: 


Guaifenesin (Guaifenesin Sf Soln 200 Mg/10 Ml Udcup)  200 mg PO Q4H PRN


   PRN Reason: Cough


Guaifenesin/Dextromethorphan (Guaifenesin Dm 100-10/5 Ml Udcup)  15 ml PO Q4H 

PRN


   PRN Reason: Cough


   Last Admin: 01/29/21 11:37 Dose:  15 ml


   Documented by: 


Hydralazine HCl (Hydralazine 20 Mg/Ml Vial)  10 mg SLOW IVP Q4H PRN


   PRN Reason: SBP > 180 and HR < 70


Promethazine HCl 12.5 mg/ (Sodium Chloride)  50.5 mls @ 202 mls/hr IVPB Q6H PRN


   PRN Reason: Nausea/vomiting use second


Iron/Minerals/Multivitamins (Multivitamin W/ Minerals 1 Tab)  1 tab PO DAILY [facility]


   Last Admin: 02/13/21 08:02 Dose:  1 tab


   Documented by: 


Ivabradine (Ivabradine 5 Mg Tab)  2.5 mg PO BID [facility]


   Last Admin: 02/13/21 08:08 Dose:  2.5 mg


   Documented by: 


Loperamide HCl (Loperamide Hcl 2 Mg Cap)  2 mg PO PRN PRN


   PRN Reason: Diarrhea/Loose Stools


Loratadine (Loratadine 10 Mg Tab)  10 mg PO DAILYPRN PRN


   PRN Reason: Sinus Symptoms


Melatonin (Melatonin 3 Mg Tab)  3 mg PO HS PRN


   PRN Reason: Insomnia


Midodrine (Midodrine Hcl 5 Mg Tab)  5 mg PO TID [facility]


   Last Admin: 02/13/21 08:18 Dose:  5 mg


   Documented by: 


Mometasone Furoate/Formoterol Fumar (Mometasone 200 Mcg/Formoterol 5 Mcg 120 

Puff Inhaler)  2 puff INH BID-RT [facility]


Ondansetron HCl (Ondansetron Pf 4 Mg/2 Ml Vial)  4 mg IVP Q6H PRN


   PRN Reason: Nausea/Vomiting use 1st


Ondansetron HCl (Ondansetron Odt 4 Mg Tab)  4 mg PO Q6H PRN


   PRN Reason: Nausea/Vomiting


Pantoprazole Sodium (Pantoprazole 40 Mg Tab)  40 mg PO Q12HR [facility]


   Last Admin: 02/13/21 08:02 Dose:  40 mg


   Documented by: 


Phenol (Chloraseptic Spray 180 Ml Bottle)  0 ml PO Q1H PRN


   PRN Reason: SORE THROAT


   Last Admin: 02/08/21 16:22 Dose:  2 spr


   Documented by: 


Potassium Chloride (Potassium Chloride 8 Meq Tab)  16 meq PO [facility]


   Last Admin: 02/13/21 08:03 Dose:  16 meq


   Documented by: 


Prednisone (Prednisone 20 Mg Tab)  20 mg PO [facility]


   Last Admin: 02/13/21 08:01 Dose:  20 mg


   Documented by: 


Saccharomyces Boulardii (Saccharomyces Boulardii 250 Mg Cap)  250 mg PO DAILY 

[facility]


   Last Admin: 02/13/21 08:02 Dose:  250 mg


   Documented by: 


Senna/Docusate Sodium (Senokot S 8.6-50 Mg Tab)  2 tab PO BID PRN


   PRN Reason: Constipation


Sodium Chloride (Flush - Normal Saline 10 Ml Syringe)  10 ml IVF Q12HR [facility]


   Last Admin: 02/13/21 08:08 Dose:  10 ml


   Documented by: 


Sodium Chloride (Flush - Normal Saline 10 Ml Syringe)  10 ml IVF PRN PRN


   PRN Reason: Saline Flush


   Last Admin: 02/06/21 02:11 Dose:  10 ml


   Documented by: 


Sodium Chloride (Sodium Chloride 0.65% Nasal 44 Ml Bot)  0 ml EA NARE QIDPRN PRN


   PRN Reason: Nasal Congestion


Throat Lozenges (Cepastat Lozenges 1 Sylvia)  1 sylvia PO Q2H PRN


   PRN Reason: Sore Throat


Zinc Sulfate (Zinc Sulfate 220 Mg Cap)  220 mg PO DAILY [facility]


   Last Admin: 02/13/21 08:02 Dose:  220 mg


   Documented by: 








Vital Signs & Weight: 


                                   Vital Signs











  Temp Pulse Resp BP Pulse Ox


 


 02/13/21 08:55  97.9 F  100  14  87/56 L  100


 


 02/13/21 06:00   86  14  97/54 L  94 L


 


 02/13/21 03:50  97.7 F  91  14  91/54 L  100


 


 02/13/21 02:00   85   104/52 L  100


 


 02/12/21 23:05  98.5 F  88  20  107/55 L  100








                                        











Admit Weight                   201 lb


 


Weight                         186 lb 4.65 oz

















- Quality Measures


Condition: Atrial Fibrillation/Flutter (hx or current), Heart Failure


CV meds: Beta Blocker: No (on hold d/t hypotension ), ACE/ARB: No (Hypotension 

), ASA: Yes, Anticoagulant: Yes (Eliquis)





- Medication Contraindications


No ACE/ARB reason: Medical contraindication


No Anticoagulant reason: Medical contraindication (r/o GI bleed)





- Physical Exam


General: alert & oriented x3, no apparent distress


HEENT: normocephaly


Neck: no JVD/HJR, no bruit


Cardiac: no murmur, S1/S2


Lungs: normal breath sounds, oxygen, other (4 L NC, distant rales at bases of 

bilateral lungs)


Neuro: numbness


Abdomen: active bowel sounds, soft, non-tender


Extremities: 2+ LE edema, other: (bilateral dorsalis pedis pulses palpable 

today, his BLE edema has improved from yesterday, his toes are no longer 

cyanotic, his feet are warm and pink)


Skin: brusing, other (thin, fragile skin, scattered bruising to bilateral arms)


Musculoskeletal: no pain





- Labs


Result Diagrams: 


                                 02/11/21 04:30





                                 02/12/21 00:08


                                  Troponin/CKMB











Troponin I  0.051 ng/mL (< 0.028)  H  02/07/21  05:15    














- EKG Interpretation


EKG Method: Telemetry


EKG: other (he continues in SA/ MAT, his HR remains in the 's)





- Assessment/Plan


Assessment/Plan: 





1. Atrial fibrillation: He is a CHADSVASC score of a 6. He remains  in sinus ar

rhythmia/ MAT HR 80's-100's. His HR has been under better control since starting

Corlanor yesterday, we will continue this. He is also on Eliquis for stroke 

prevention 


2. COVID-19 Pneumonia: he is on 4 L nasal cannula today, he states his breathing

feels much better, his luing sounds are more clear, he does have distant 

wheezing to bilateral bases


3. Cardiomyopathy: EF 40-45% (August 2020) his BP remains hypotensive but he is 

asymptomatic, we will continue Midodrine for this.   His BP is improved when 

patient is in the prone position


4. Metastatic Prostate Cancer


5. UTI with gram negative rods: treated by primary care services


6. Bilateral lower extremity edema: His BLE edema is much improved. 


7. Bilateral DVT: Venous duplex revealed bilateral non-occlusive thrombus 

involving the deep venous system of each lower extremity, he has refused an IVC 

filter, he has been placed on PO Eliquis. His toes are no longer cyanotic today.




8. Blood in stool: no more blood in stool or diarrhea per patient report, denies

any abdominal complaints today, Hgb stable


9. Intravascular third spacing: he has received albumin, his edema to his BLE 

has improved since yesterday. We will continue to monitor this 





We will continue to follow the patient and continue symptomatic treatment at 

this time. Likely transfer to Skilled Nursing facility soon.

## 2021-02-14 LAB
ANION GAP SERPL CALC-SCNC: 14 MMOL/L (ref 10–20)
BASOPHILS # BLD AUTO: 0 THOU/UL (ref 0–0.2)
BASOPHILS NFR BLD AUTO: 0 % (ref 0–1)
BUN SERPL-MCNC: 43 MG/DL (ref 8.4–25.7)
CALCIUM SERPL-MCNC: 7.4 MG/DL (ref 7.8–10.44)
CHLORIDE SERPL-SCNC: 108 MMOL/L (ref 98–107)
CO2 SERPL-SCNC: 24 MMOL/L (ref 23–31)
CREAT CL PREDICTED SERPL C-G-VRATE: 78 ML/MIN (ref 70–130)
EOSINOPHIL # BLD AUTO: 0 THOU/UL (ref 0–0.7)
EOSINOPHIL NFR BLD AUTO: 0.5 % (ref 0–10)
GLUCOSE SERPL-MCNC: 75 MG/DL (ref 83–110)
HGB BLD-MCNC: 8.8 G/DL (ref 14–18)
LYMPHOCYTES # BLD: 1 THOU/UL (ref 1.2–3.4)
LYMPHOCYTES NFR BLD AUTO: 13.9 % (ref 21–51)
MCH RBC QN AUTO: 31.9 PG (ref 27–31)
MCV RBC AUTO: 99.9 FL (ref 78–98)
MDIFF COMPLETE?: YES
MONOCYTES # BLD AUTO: 0.4 THOU/UL (ref 0.11–0.59)
MONOCYTES NFR BLD AUTO: 5.5 % (ref 0–10)
NEUTROPHILS # BLD AUTO: 5.9 THOU/UL (ref 1.4–6.5)
NEUTROPHILS NFR BLD AUTO: 80.1 % (ref 42–75)
PLATELET # BLD AUTO: (no result) THOU/UL (ref 130–400)
POTASSIUM SERPL-SCNC: 3.3 MMOL/L (ref 3.5–5.1)
RBC # BLD AUTO: 2.77 MILL/UL (ref 4.7–6.1)
SODIUM SERPL-SCNC: 143 MMOL/L (ref 136–145)
WBC # BLD AUTO: 7.4 THOU/UL (ref 4.8–10.8)

## 2021-02-14 RX ADMIN — Medication SCH ML: at 09:21

## 2021-02-14 RX ADMIN — Medication SCH: at 14:03

## 2021-02-14 RX ADMIN — MULTIPLE VITAMINS W/ MINERALS TAB SCH TAB: TAB at 09:15

## 2021-02-14 RX ADMIN — MOMETASONE FUROATE AND FORMOTEROL FUMARATE DIHYDRATE SCH: 200; 5 AEROSOL RESPIRATORY (INHALATION) at 13:03

## 2021-02-14 RX ADMIN — Medication SCH MG: at 09:14

## 2021-02-14 RX ADMIN — MULTIPLE VITAMINS W/ MINERALS TAB SCH: TAB at 14:04

## 2021-02-14 RX ADMIN — MOMETASONE FUROATE AND FORMOTEROL FUMARATE DIHYDRATE SCH: 200; 5 AEROSOL RESPIRATORY (INHALATION) at 13:24

## 2021-02-14 RX ADMIN — ASPIRIN 81 MG CHEWABLE TABLET SCH MG: 81 TABLET CHEWABLE at 21:22

## 2021-02-14 RX ADMIN — MOMETASONE FUROATE AND FORMOTEROL FUMARATE DIHYDRATE SCH PUFF: 200; 5 AEROSOL RESPIRATORY (INHALATION) at 18:41

## 2021-02-14 RX ADMIN — CYANOCOBALAMIN TAB 1000 MCG SCH MCG: 1000 TAB at 21:22

## 2021-02-14 RX ADMIN — MOMETASONE FUROATE AND FORMOTEROL FUMARATE DIHYDRATE SCH PUFF: 200; 5 AEROSOL RESPIRATORY (INHALATION) at 10:21

## 2021-02-14 RX ADMIN — Medication SCH ML: at 21:23

## 2021-02-14 RX ADMIN — MOMETASONE FUROATE AND FORMOTEROL FUMARATE DIHYDRATE SCH: 200; 5 AEROSOL RESPIRATORY (INHALATION) at 14:44

## 2021-02-14 NOTE — PDOC.CPN
- Subjective


Date: 02/14/21


Time: 12:11


Interval history: 





Patient sitting in bed, he is alert & oriented today. He denies any complaints 

today. He states he feels good. 





- Review of Systems


General: denies: fever/chills, weight/appetite/sleep changes, night sweats, 

fatigue


Respiratory: denies: cough, congestion, shortness of breath, exercise 

intolerance


Cardiovascular: denies: chest pain, palpitation, edema, paroxysmal nocturnal 

dyspnea, orthopnea


Gastrointestinal: denies: nausea, vomiting, diarrhea, constipation, abd pain, GI

bleeding


Musculoskeletal: denies: pain, tenderness, stiffness, swelling, 

arthritis/arthralgias


Neurological: denies: numbness, syncope, seizure, weakness





- Objective


Allergies/Adverse Reactions: 


                                    Allergies











Allergy/AdvReac Type Severity Reaction Status Date / Time


 


amitriptyline Allergy  Confusion Verified 11/05/20 06:25


 


hydrochlorothiazide Allergy  Hives Verified 11/04/20 22:58


 


valsartan Allergy  Hives Verified 11/04/20 22:58


 


ACE Inhibitors AdvReac  Hives Verified 11/04/20 22:58











Visit Medications: 


                               Current Medications





Acetaminophen (Acetaminophen 325 Mg Tab)  650 mg PO Q4H PRN


   PRN Reason: Headache/Fever/Mild Pain (1-3)


   Last Admin: 02/02/21 08:46 Dose:  650 mg


   Documented by: 


Hydrocodone Bitart/Acetaminophen (Hydrocodone/Acetaminophen 5/325 Mg Tablet)  1 

tab PO Q4H PRN


   PRN Reason: Moderate Pain (4-6)


Albuterol/Ipratropium (Ipratropium/Albuterol Sulfate 3 Ml Neb)  3 ml NEB [id number]

PRN


   PRN Reason: SOB &/or Wheezing


Apixaban (Apixaban 5 Mg Tab)  5 mg PO BID [facility]


   Last Admin: 02/14/21 09:15 Dose:  5 mg


   Documented by: 


Ascorbic Acid (Ascorbic Acid 500 Mg Chewable Tablet)  1,000 mg PO DAILY [facility]


   Last Admin: 02/14/21 09:14 Dose:  1,000 mg


   Documented by: 


Aspirin (Aspirin Chewable 81 Mg Tab)  81 mg PO HS [facility]


   Last Admin: 02/13/21 20:53 Dose:  81 mg


   Documented by: 


Benzonatate (Benzonatate 100 Mg Cap)  100 mg PO Q6H PRN


   PRN Reason: Cough


Bisacodyl (Bisacodyl 5 Mg Tab)  5 mg PO DAILY PRN


   PRN Reason: Constipation


Bisacodyl (Bisacodyl 10 Mg Supp)  10 mg [place] DAILYPRN PRN


   PRN Reason: Constipation


Calcium Carbonate (Calcium Carbonate 500 Mg Chewtab)  1,000 mg PO Q4H PRN


   PRN Reason: Heartburn  or Indigestion


Colchicine (Colchicine 0.6 Mg Tab)  0.6 mg PO BID [facility]


   Last Admin: 02/14/21 09:15 Dose:  0.6 mg


   Documented by: 


Cyanocobalamin (Cyanocobalamin (Vitamin B-12) 1,000 Mcg Tab)  1,000 mcg PO [facility]


   Last Admin: 02/13/21 20:56 Dose:  1,000 mcg


   Documented by: 


Docusate Sodium (Docusate 100 Mg Cap)  100 mg PO DAILY PRN


   PRN Reason: Constipation


Ferrous Sulfate (Ferrous Sulfate 325 Mg Tab)  325 mg PO QA-[facility]


   Last Admin: 02/14/21 09:13 Dose:  325 mg


   Documented by: 


Folic Acid (Folic Acid 1 Mg Tab)  1 mg PO DAILY [facility]


   Last Admin: 02/14/21 09:15 Dose:  1 mg


   Documented by: 


Furosemide (Furosemide 20 Mg Tab)  20 mg PO DAILY [facility]


   Last Admin: 02/14/21 09:14 Dose:  20 mg


   Documented by: 


Guaifenesin (Guaifenesin Sf Soln 200 Mg/10 Ml Udcup)  200 mg PO Q4H PRN


   PRN Reason: Cough


Guaifenesin/Dextromethorphan (Guaifenesin Dm 100-10/5 Ml Udcup)  15 ml PO Q4H 

PRN


   PRN Reason: Cough


   Last Admin: 01/29/21 11:37 Dose:  15 ml


   Documented by: 


Hydralazine HCl (Hydralazine 20 Mg/Ml Vial)  10 mg SLOW IVP Q4H PRN


   PRN Reason: SBP > 180 and HR < 70


Promethazine HCl 12.5 mg/ (Sodium Chloride)  50.5 mls @ 202 mls/hr IVPB Q6H PRN


   PRN Reason: Nausea/vomiting use second


Iron/Minerals/Multivitamins (Multivitamin W/ Minerals 1 Tab)  1 tab PO DAILY [facility]


   Last Admin: 02/14/21 09:15 Dose:  1 tab


   Documented by: 


Ivabradine (Ivabradine 5 Mg Tab)  2.5 mg PO BID [facility]


   Last Admin: 02/14/21 09:10 Dose:  2.5 mg


   Documented by: 


Loperamide HCl (Loperamide Hcl 2 Mg Cap)  2 mg PO PRN PRN


   PRN Reason: Diarrhea/Loose Stools


Loratadine (Loratadine 10 Mg Tab)  10 mg PO DAILYPRN PRN


   PRN Reason: Sinus Symptoms


Melatonin (Melatonin 3 Mg Tab)  3 mg PO HS PRN


   PRN Reason: Insomnia


Midodrine (Midodrine Hcl 5 Mg Tab)  5 mg PO TID [facility]


   Last Admin: 02/14/21 09:17 Dose:  5 mg


   Documented by: 


Mometasone Furoate/Formoterol Fumar (Mometasone 200 Mcg/Formoterol 5 Mcg 120 

Puff Inhaler)  2 puff INH BID-RT [facility]


   Last Admin: 02/14/21 10:21 Dose:  2 puff


   Documented by: 


Ondansetron HCl (Ondansetron Pf 4 Mg/2 Ml Vial)  4 mg IVP Q6H PRN


   PRN Reason: Nausea/Vomiting use 1st


Ondansetron HCl (Ondansetron Odt 4 Mg Tab)  4 mg PO Q6H PRN


   PRN Reason: Nausea/Vomiting


Pantoprazole Sodium (Pantoprazole 40 Mg Tab)  40 mg PO Q12HR [facility]


   Last Admin: 02/14/21 09:15 Dose:  40 mg


   Documented by: 


Phenol (Chloraseptic Spray 180 Ml Bottle)  0 ml PO Q1H PRN


   PRN Reason: SORE THROAT


   Last Admin: 02/08/21 16:22 Dose:  2 spr


   Documented by: 


Potassium Chloride (Potassium Chloride 8 Meq Tab)  16 meq PO [facility]


   Last Admin: 02/14/21 09:14 Dose:  16 meq


   Documented by: 


Prednisone (Prednisone 20 Mg Tab)  20 mg PO [facility]


   Last Admin: 02/14/21 09:15 Dose:  20 mg


   Documented by: 


Saccharomyces Boulardii (Saccharomyces Boulardii 250 Mg Cap)  250 mg PO DAILY 

[facility]


   Last Admin: 02/14/21 09:14 Dose:  250 mg


   Documented by: 


Senna/Docusate Sodium (Senokot S 8.6-50 Mg Tab)  2 tab PO BID PRN


   PRN Reason: Constipation


Sodium Chloride (Flush - Normal Saline 10 Ml Syringe)  10 ml IVF Q12HR [facility]


   Last Admin: 02/14/21 09:21 Dose:  10 ml


   Documented by: 


Sodium Chloride (Flush - Normal Saline 10 Ml Syringe)  10 ml IVF PRN PRN


   PRN Reason: Saline Flush


   Last Admin: 02/06/21 02:11 Dose:  10 ml


   Documented by: 


Sodium Chloride (Sodium Chloride 0.65% Nasal 44 Ml Bot)  0 ml EA NARE QIDPRN PRN


   PRN Reason: Nasal Congestion


Throat Lozenges (Cepastat Lozenges 1 Sylvia)  1 sylvia PO Q2H PRN


   PRN Reason: Sore Throat


Zinc Sulfate (Zinc Sulfate 220 Mg Cap)  220 mg PO DAILY [facility]


   Last Admin: 02/14/21 09:13 Dose:  220 mg


   Documented by: 








Vital Signs & Weight: 


                                   Vital Signs











  Temp Pulse Resp BP Pulse Ox


 


 02/14/21 11:47  98.8 F    


 


 02/14/21 10:00   85  22 H  79/51 L  100


 


 02/14/21 08:00  98 F  71  14  111/56 L  100


 


 02/14/21 06:00   89  22 H  111/56 L  100


 


 02/14/21 04:55  98.3 F  84  20  88/51 L  100








                                        











Admit Weight                   201 lb


 


Weight                         186 lb 4.65 oz

















- Quality Measures


Condition: Atrial Fibrillation/Flutter (hx or current), Heart Failure


CV meds: Beta Blocker: No (on hold d/t hypotension ), ACE/ARB: No (Hypotension 

), ASA: Yes, Anticoagulant: Yes (Eliquis)





- Medication Contraindications


No ACE/ARB reason: Medical contraindication


No Anticoagulant reason: Medical contraindication (r/o GI bleed)





- Physical Exam


General: alert & oriented x3, no apparent distress


HEENT: normocephaly


Neck: no JVD/HJR, no bruit


Cardiac: regular rate and rhythm, no murmur, S1/S2


Lungs: normal breath sounds, no wheeze, rales, rhonchi, oxygen


Neuro: numbness


Abdomen: active bowel sounds, soft, non-tender


Extremities: 1+ LE edema, other: (1+ dorsalis pedus pulses bilaterally)


Skin: brusing, other (thin fragile skin, wheeping to bilateral arms)


Musculoskeletal: no pain





- Labs


Result Diagrams: 


                                 02/14/21 03:59





                                 02/14/21 03:59


                                  Troponin/CKMB











Troponin I  0.051 ng/mL (< 0.028)  H  02/07/21  05:15    














- EKG Interpretation


EKG Method: Telemetry


EKG: other (continued WAP, his rate remained under control throughout the night)





- Assessment/Plan


Assessment/Plan: 





1. Atrial fibrillation: He is a CHADSVASC score of a 6. He remains  in WAP/ MAT 

in the low 100's. He is also on Eliquis for stroke prevention 


2. COVID-19 Pneumonia: he is on 4 L nasal cannula today, he states his breathing

feels much better, his luing sounds are more clear


3. Cardiomyopathy: EF 40-45% (August 2020) his BP remains hypotensive but he is 

asymptomatic, we will continue Midodrine for this.   His BP is improved when 

patient is in the prone position, continued intermittent hypotensive episodes, 

asymptomatic. When he is off of COVID-19 precautions, he will need another ECHO 

to evaluate EF


4. Metastatic Prostate Cancer


5. UTI with gram negative rods: treated by primary care services


6. Bilateral lower extremity edema: His BLE edema is much improved. 


7. Bilateral DVT: Venous duplex revealed bilateral non-occlusive thrombus 

involving the deep venous system of each lower extremity, he has refused an IVC 

filter, he has been placed on PO Eliquis. His toes are no longer cyanotic today 

palpable pedal pulses 


8. Blood in stool: no more blood in stool or diarrhea per patient report, denies

any abdominal complaints today, Hgb stable


9. Intravascular third spacing: he has received albumin on Friday, his continues

to have edema, slight improvement noted, will order repeat chest x-ray, last 

chest x-ray several days ago


The nurse has reported he has decreased urine output: BUN & Creatinine increased

from yesterday





We will continue to follow the patient and continue symptomatic treatment at 

this time. Likely transfer to Skilled Nursing facility soon.

## 2021-02-14 NOTE — PDOC.HOSPP
- Subjective


Encounter Date: 02/14/21


Encounter Time: 08:30


Subjective: 


Patient seen and examined. No new complaints. No overnight events





- Objective


Vital Signs & Weight: 


                             Vital Signs (12 hours)











  Temp Pulse Resp BP Pulse Ox


 


 02/14/21 08:00  98 F  71  14  111/56 L  100


 


 02/14/21 06:00   89  22 H  111/56 L  100


 


 02/14/21 04:55  98.3 F  84  20  88/51 L  100


 


 02/14/21 00:00  98.3 F  88  20  123/63  100








                                     Weight











Admit Weight                   201 lb


 


Weight                         186 lb 4.65 oz














I&O: 


                                        











 02/13/21 02/14/21 02/15/21





 06:59 06:59 06:59


 


Intake Total 477 960 


 


Output Total 95 350 


 


Balance 382 610 











Result Diagrams: 


                                 02/14/21 03:59





                                 02/14/21 03:59


EKG Reviewed by me: Yes





Hospitalist ROS





- Review of Systems


Constitutional: reports: weakness, malaise.  denies: fever, chills, sweats, 

other


ENT: denies: ear pain, ear discharge, nose pain, nose discharge, nose 

congestion, mouth pain, mouth swelling, throat pain, throat swelling, other


Respiratory: denies: cough, dry, shortness of breath, hemoptysis, SOB with 

excertion, pleuritic pain, sputum, wheezing, other


Cardiovascular: denies: chest pain, palpitations, orthopnea, paroxysmal noc. 

dyspnea, edema, light headedness, other


Gastrointestinal: denies: nausea, vomiting, abdominal pain, diarrhea, 

constipation, melena, hematochezia, other


Genitourinary: denies: dysuria, frequency, incontinence, hematuria, retention, 

other


Musculoskeletal: denies: neck pain, shoulder pain, arm pain, back pain, hand 

pain, leg pain, foot pain, other





- Medication


Medications: 


Active Medications











Generic Name Dose Route Start Last Admin





  Trade Name Freq  PRN Reason Stop Dose Admin


 


Acetaminophen  650 mg  01/29/21 03:46  02/02/21 08:46





  Acetaminophen 325 Mg Tab  PO   650 mg





  Q4H PRN   Administration





  Headache/Fever/Mild Pain (1-3)  


 


Apixaban  5 mg  02/09/21 21:00  02/14/21 09:15





  Apixaban 5 Mg Tab  PO   5 mg





  BID AMANDA   Administration


 


Ascorbic Acid  1,000 mg  01/29/21 09:00  02/14/21 09:14





  Ascorbic Acid 500 Mg Chewable Tablet  PO   1,000 mg





  DAILY AMANDA   Administration


 


Aspirin  81 mg  01/29/21 21:00  02/13/21 20:53





  Aspirin Chewable 81 Mg Tab  PO   81 mg





  HS AMANDA   Administration


 


Colchicine  0.6 mg  01/29/21 09:00  02/14/21 09:15





  Colchicine 0.6 Mg Tab  PO   0.6 mg





  BID AMANDA   Administration


 


Cyanocobalamin  1,000 mcg  02/10/21 21:00  02/13/21 20:56





  Cyanocobalamin (Vitamin B-12) 1,000 Mcg Tab  PO   1,000 mcg





  HS AMANDA   Administration


 


Ferrous Sulfate  325 mg  02/11/21 08:00  02/14/21 09:13





  Ferrous Sulfate 325 Mg Tab  PO   325 mg





  QAM-WM AMANDA   Administration


 


Folic Acid  1 mg  02/11/21 09:00  02/14/21 09:15





  Folic Acid 1 Mg Tab  PO   1 mg





  DAILY AMANDA   Administration


 


Furosemide  20 mg  02/11/21 09:00  02/14/21 09:14





  Furosemide 20 Mg Tab  PO   20 mg





  DAILY AMANDA   Administration


 


Guaifenesin/Dextromethorphan  15 ml  01/29/21 03:46  01/29/21 11:37





  Guaifenesin Dm 100-10/5 Ml Udcup  PO   15 ml





  Q4H PRN   Administration





  Cough  


 


Iron/Minerals/Multivitamins  1 tab  02/11/21 09:00  02/14/21 09:15





  Multivitamin W/ Minerals 1 Tab  PO   1 tab





  DAILY AMANDA   Administration


 


Ivabradine  2.5 mg  02/11/21 21:00  02/14/21 09:10





  Ivabradine 5 Mg Tab  PO   2.5 mg





  BID AMANDA   Administration


 


Midodrine  5 mg  02/12/21 09:00  02/14/21 09:17





  Midodrine Hcl 5 Mg Tab  PO   5 mg





  TID AMANDA   Administration


 


Pantoprazole Sodium  40 mg  02/11/21 09:00  02/14/21 09:15





  Pantoprazole 40 Mg Tab  PO   40 mg





  Q12HR AMANDA   Administration


 


Phenol  0 ml  02/08/21 15:46  02/08/21 16:22





  Chloraseptic Spray 180 Ml Bottle  PO   2 spr





  Q1H PRN   Administration





  SORE THROAT  


 


Potassium Chloride  16 meq  02/04/21 08:00  02/14/21 09:14





  Potassium Chloride 8 Meq Tab  PO   16 meq





  QAM-WM AMANDA   Administration


 


Prednisone  20 mg  02/12/21 08:00  02/14/21 09:15





  Prednisone 20 Mg Tab  PO   20 mg





  QAM-WM AMANDA   Administration


 


Saccharomyces Boulardii  250 mg  02/11/21 09:00  02/14/21 09:14





  Saccharomyces Boulardii 250 Mg Cap  PO   250 mg





  DAILY AMANDA   Administration


 


Sodium Chloride  10 ml  02/01/21 21:00  02/14/21 09:21





  Flush - Normal Saline 10 Ml Syringe  IVF   10 ml





  Q12HR AMANDA   Administration


 


Sodium Chloride  10 ml  02/01/21 13:30  02/06/21 02:11





  Flush - Normal Saline 10 Ml Syringe  IVF   10 ml





  PRN PRN   Administration





  Saline Flush  


 


Zinc Sulfate  220 mg  01/29/21 09:00  02/14/21 09:13





  Zinc Sulfate 220 Mg Cap  PO   220 mg





  DAILY AMANDA   Administration














Hospitalist Exam


Vitals: 


                             Vital Signs (12 hours)











  Temp Pulse Resp BP Pulse Ox


 


 02/14/21 08:00  98 F  71  14  111/56 L  100


 


 02/14/21 06:00   89  22 H  111/56 L  100


 


 02/14/21 04:55  98.3 F  84  20  88/51 L  100


 


 02/14/21 00:00  98.3 F  88  20  123/63  100








                                     Weight











Admit Weight                   201 lb


 


Weight                         186 lb 4.65 oz














General Appearance: NAD, awake alert


Eye: PERRL, anicteric sclera


ENT: normocephalic atraumatic, no oropharyngeal lesions


Neck: supple, symmetric, no JVD


Heart: no murmur, no gallops, no rubs, irregular


Respiratory: no wheezes, no rales, no ronchi


Gastrointestinal: soft, non-tender, non-distended, normal bowel sounds


Extremities: no clubbing, 1+ LE edema


Skin: normal turgor, no lesions


Neurological - other findings: He has chronic weakness of both lower extremity


Musculoskeletal: normal tone, normal strength


Psychiatric: normal affect, normal behavior





Hosp A/P


(1) Acute respiratory failure due to COVID-19


Code(s): U07.1 - COVID-19; J96.00 - ACUTE RESPIRATORY FAILURE, UNSP W HYPOXIA OR

HYPERCAPNIA   Status: Acute   





(2) Pneumonia due to 2019 novel coronavirus


Code(s): U07.1 - COVID-19; J12.82 - PNEUMONIA DUE TO CORONAVIRUS DISEASE 2019   

Status: Acute   





(3) DVT (deep venous thrombosis)


Code(s): I82.409 - ACUTE EMBOLISM AND THOMBOS UNSP DEEP VN UNSP LOWER EXTREMITY 

 Status: Acute   


Qualifiers: 


   DVT location: lower extremity   Chronicity: acute   Laterality: bilateral 





(4) Urinary tract infection


Status: Acute   


Qualifiers: 


   Urinary tract infection type: acute cystitis   Hematuria presence: without 

hematuria   Qualified Code(s): N30.00 - Acute cystitis without hematuria   





(5) KENAN (acute kidney injury)


Code(s): N17.9 - ACUTE KIDNEY FAILURE, UNSPECIFIED   Status: Acute   





(6) Acute on chronic systolic (congestive) heart failure


Code(s): I50.23 - ACUTE ON CHRONIC SYSTOLIC (CONGESTIVE) HEART FAILURE   Status:

Acute   





(7) Atrial fibrillation with rapid ventricular response


Code(s): I48.91 - UNSPECIFIED ATRIAL FIBRILLATION   Status: Acute   





(8) CKD (chronic kidney disease), stage III


Code(s): N18.3 - CHRONIC KIDNEY DISEASE, STAGE 3 (MODERATE) * DO NOT USE *   

Status: Chronic   


Qualifiers: 


   Chronic kidney disease stage 3 subtype: stage 3a (GFR 45-59)   Qualified 

Code(s): N18.31 - Chronic kidney disease, stage 3a   





(9) HTN (hypertension)


Code(s): I10 - ESSENTIAL (PRIMARY) HYPERTENSION   Status: Chronic   


Qualifiers: 


   Hypertension type: essential hypertension   Qualified Code(s): I10 - 

Essential (primary) hypertension   





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


Code(s): C61 - MALIGNANT NEOPLASM OF PROSTATE   Status: Chronic   





(11) Spinal stenosis of lumbar region


Code(s): M48.061 - SPINAL STENOSIS, LUMBAR REGION WITHOUT NEUROGENIC CASA   

Status: Chronic   


Qualifiers: 


   Neurogenic claudication status: unspecified   Qualified Code(s): M48.061 - 

Spinal stenosis, lumbar region without neurogenic claudication   





(12) GI bleed


Code(s): K92.2 - GASTROINTESTINAL HEMORRHAGE, UNSPECIFIED   Status: Resolved   





- Plan


old records reviewed/req, plan discussed w/ family, PT/OT, 





I have updated patient's condition to patient's wife on phone,


At this point will continue Eliquis, his H&H overall remained stable,


Medication reviewed and continue provide symptomatic and supportive care


Currently he is on 3.5 L nasal cannula oxygen, now patient is approaching 2 

hours transition to skilled nursing home,


Patient will need a skilled nursing home placement

## 2021-02-14 NOTE — RAD
SINGLE VIEW OF THE CHEST: 

 

Comparison: 2-9-2021

 

History: Covid pneumonia with edema. 

 

FINDINGS: 

Single view of the chest shows an enlarged but stable cardiomediastinal silhouette with atherosclerot
ic calcifications in the aorta. There are bilateral infiltrates in the lungs which have improved comp
ared to the prior exam. Small pleural effusions may also be present. Degenerative changes are seen in
 the spine and shoulders. 

 

IMPRESSION: 

1. Improving bilateral pulmonary infiltrates. 

2. Bilateral pleural effusions. 

 

POS: EAA

## 2021-02-15 LAB
ANION GAP SERPL CALC-SCNC: 15 MMOL/L (ref 10–20)
ANISOCYTOSIS BLD QL SMEAR: (no result) (100X)
BUN SERPL-MCNC: 40 MG/DL (ref 8.4–25.7)
CALCIUM SERPL-MCNC: 7.4 MG/DL (ref 7.8–10.44)
CHLORIDE SERPL-SCNC: 106 MMOL/L (ref 98–107)
CO2 SERPL-SCNC: 23 MMOL/L (ref 23–31)
CREAT CL PREDICTED SERPL C-G-VRATE: 83 ML/MIN (ref 70–130)
GLUCOSE SERPL-MCNC: 69 MG/DL (ref 83–110)
HGB BLD-MCNC: 9.1 G/DL (ref 14–18)
MACROCYTES BLD QL SMEAR: (no result) (100X)
MCH RBC QN AUTO: 31.8 PG (ref 27–31)
MCV RBC AUTO: 101 FL (ref 78–98)
MDIFF COMPLETE?: YES
PLATELET # BLD AUTO: 130 THOU/UL (ref 130–400)
POLYCHROMASIA BLD QL SMEAR: (no result) (100X)
POTASSIUM SERPL-SCNC: 2.9 MMOL/L (ref 3.5–5.1)
RBC # BLD AUTO: 2.85 MILL/UL (ref 4.7–6.1)
SODIUM SERPL-SCNC: 141 MMOL/L (ref 136–145)
TARGETS BLD QL SMEAR: (no result) (100X)
WBC # BLD AUTO: 6.7 THOU/UL (ref 4.8–10.8)

## 2021-02-15 RX ADMIN — CYANOCOBALAMIN TAB 1000 MCG SCH MCG: 1000 TAB at 22:55

## 2021-02-15 RX ADMIN — MOMETASONE FUROATE AND FORMOTEROL FUMARATE DIHYDRATE SCH PUFF: 200; 5 AEROSOL RESPIRATORY (INHALATION) at 17:48

## 2021-02-15 RX ADMIN — ASPIRIN 81 MG CHEWABLE TABLET SCH MG: 81 TABLET CHEWABLE at 22:55

## 2021-02-15 RX ADMIN — Medication SCH ML: at 22:57

## 2021-02-15 RX ADMIN — MULTIPLE VITAMINS W/ MINERALS TAB SCH TAB: TAB at 11:45

## 2021-02-15 RX ADMIN — Medication SCH MG: at 11:44

## 2021-02-15 RX ADMIN — Medication SCH ML: at 07:35

## 2021-02-15 NOTE — PRG
DATE OF SERVICE:  02/15/2021



SUBJECTIVE:  This morning, the patient is awake and responsive.



OBJECTIVE:  VITAL SINGS:  Temperature 98.0, pulse rate 70, respiratory rate

__________, and sats 100% on 3 L. 

CHEST:  No wheezing.  No crackles. 

CARDIAC:  Normal S1, S2. No gallops.



IMPRESSION:  

1. Congestive heart failure.

2. Corona positive pneumonia.

3. Pleural effusion.  Steroids on board.



PLAN:  Disposition as per primary care physician.  He is probably ready to be

discharged to rehab or home  __________ 







Job ID:  [id number]

## 2021-02-15 NOTE — PDOC.HOSPP
- Subjective


Encounter Date: 02/15/21


Encounter Time: 08:00


Subjective: 


Patient seen and examined. No new complaints. No overnight events





- Objective


Vital Signs & Weight: 


                             Vital Signs (12 hours)











  Temp Pulse Resp BP Pulse Ox


 


 02/15/21 08:00  98.1 F  79  20  91/53 L  100








                                     Weight











Admit Weight                   201 lb


 


Weight                         186 lb 4.65 oz














I&O: 


                                        











 02/14/21 02/15/21 02/16/21





 06:59 06:59 06:59


 


Intake Total 960 717 


 


Output Total 350 200 


 


Balance 610 517 











Result Diagrams: 


                                 02/14/21 03:59





                                 02/14/21 03:59


EKG Reviewed by me: Yes





Hospitalist ROS





- Review of Systems


ENT: denies: ear pain, ear discharge, nose pain, nose discharge, nose 

congestion, mouth pain, mouth swelling, throat pain, throat swelling, other


Respiratory: denies: cough, dry, shortness of breath, hemoptysis, SOB with 

excertion, pleuritic pain, sputum, wheezing, other


Cardiovascular: denies: chest pain, palpitations, orthopnea, paroxysmal noc. 

dyspnea, edema, light headedness, other


Gastrointestinal: denies: nausea, vomiting, abdominal pain, diarrhea, 

constipation, melena, hematochezia, other


Genitourinary: denies: dysuria, frequency, incontinence, hematuria, retention, 

other


Musculoskeletal: denies: neck pain, shoulder pain, arm pain, back pain, hand 

pain, leg pain, foot pain, other


Other: 





limited due to his cognitive dysfunction





- Medication


Medications: 


Active Medications











Generic Name Dose Route Start Last Admin





  Trade Name Freq  PRN Reason Stop Dose Admin


 


Acetaminophen  650 mg  01/29/21 03:46  02/02/21 08:46





  Acetaminophen 325 Mg Tab  PO   650 mg





  Q4H PRN   Administration





  Headache/Fever/Mild Pain (1-3)  


 


Apixaban  5 mg  02/09/21 21:00  02/15/21 11:42





  Apixaban 5 Mg Tab  PO   5 mg





  BID AMANDA   Administration


 


Ascorbic Acid  1,000 mg  01/29/21 09:00  02/15/21 11:44





  Ascorbic Acid 500 Mg Chewable Tablet  PO   1,000 mg





  DAILY AMANDA   Administration


 


Aspirin  81 mg  01/29/21 21:00  02/14/21 21:22





  Aspirin Chewable 81 Mg Tab  PO   81 mg





  HS AMANDA   Administration


 


Colchicine  0.6 mg  01/29/21 09:00  02/15/21 11:44





  Colchicine 0.6 Mg Tab  PO   0.6 mg





  BID AMANDA   Administration


 


Cyanocobalamin  1,000 mcg  02/10/21 21:00  02/14/21 21:22





  Cyanocobalamin (Vitamin B-12) 1,000 Mcg Tab  PO   1,000 mcg





  HS AMANDA   Administration


 


Ferrous Sulfate  325 mg  02/11/21 08:00  02/15/21 11:41





  Ferrous Sulfate 325 Mg Tab  PO   325 mg





  QAM-WM AMANDA   Administration


 


Folic Acid  1 mg  02/11/21 09:00  02/15/21 11:45





  Folic Acid 1 Mg Tab  PO   1 mg





  DAILY AMANDA   Administration


 


Furosemide  20 mg  02/11/21 09:00  02/14/21 14:03





  Furosemide 20 Mg Tab  PO   Not Given





  DAILY AMANDA  


 


Guaifenesin/Dextromethorphan  15 ml  01/29/21 03:46  01/29/21 11:37





  Guaifenesin Dm 100-10/5 Ml Udcup  PO   15 ml





  Q4H PRN   Administration





  Cough  


 


Iron/Minerals/Multivitamins  1 tab  02/11/21 09:00  02/15/21 11:45





  Multivitamin W/ Minerals 1 Tab  PO   1 tab





  DAILY AMANDA   Administration


 


Ivabradine  2.5 mg  02/11/21 21:00  02/15/21 12:00





  Ivabradine 5 Mg Tab  PO   2.5 mg





  BID AMANDA   Administration


 


Midodrine  5 mg  02/12/21 09:00  02/15/21 11:39





  Midodrine Hcl 5 Mg Tab  PO   5 mg





  TID AMANDA   Administration


 


Mometasone Furoate/Formoterol Fumar  2 puff  02/09/21 18:30  02/14/21 18:41





  Mometasone 200 Mcg/Formoterol 5 Mcg 120 Puff Inhaler  INH   2 puff





  BID-RT AMANDA   Administration


 


Pantoprazole Sodium  40 mg  02/11/21 09:00  02/15/21 11:48





  Pantoprazole 40 Mg Tab  PO   40 mg





  Q12HR AMANDA   Administration


 


Phenol  0 ml  02/08/21 15:46  02/08/21 16:22





  Chloraseptic Spray 180 Ml Bottle  PO   2 spr





  Q1H PRN   Administration





  SORE THROAT  


 


Potassium Chloride  16 meq  02/04/21 08:00  02/15/21 12:00





  Potassium Chloride 8 Meq Tab  PO   16 meq





  QAM-WM AMANDA   Administration


 


Prednisone  20 mg  02/12/21 08:00  02/15/21 11:42





  Prednisone 20 Mg Tab  PO   20 mg





  QAM-WM AMANDA   Administration


 


Saccharomyces Boulardii  250 mg  02/11/21 09:00  02/15/21 11:48





  Saccharomyces Boulardii 250 Mg Cap  PO   250 mg





  DAILY AMANDA   Administration


 


Sodium Chloride  10 ml  02/01/21 21:00  02/15/21 07:35





  Flush - Normal Saline 10 Ml Syringe  IVF   10 ml





  Q12HR AMANDA   Administration


 


Sodium Chloride  10 ml  02/01/21 13:30  02/06/21 02:11





  Flush - Normal Saline 10 Ml Syringe  IVF   10 ml





  PRN PRN   Administration





  Saline Flush  


 


Zinc Sulfate  220 mg  01/29/21 09:00  02/15/21 11:49





  Zinc Sulfate 220 Mg Cap  PO   220 mg





  DAILY AMANDA   Administration














Hospitalist Exam


Vitals: 


                             Vital Signs (12 hours)











  Temp Pulse Resp BP Pulse Ox


 


 02/15/21 08:00  98.1 F  79  20  91/53 L  100








                                     Weight











Admit Weight                   201 lb


 


Weight                         186 lb 4.65 oz














General Appearance: NAD, awake alert


Eye: PERRL, anicteric sclera


ENT: normocephalic atraumatic


Neck: symmetric, no JVD, no thyromegaly


Heart: no murmur, no gallops, no rubs


Respiratory: no wheezes, no rales, no ronchi


Gastrointestinal: soft, non-tender, non-distended, normal bowel sounds


Extremities: no cyanosis, no clubbing, 1+ LE edema


Skin: normal turgor, no lesions


Neurological - other findings: both leg weak


Musculoskeletal: generalized weakness, diffuse muscle atrophy


Psychiatric: normal affect, normal behavior





Hosp A/P


(1) Acute respiratory failure due to COVID-19


Code(s): U07.1 - COVID-19; J96.00 - ACUTE RESPIRATORY FAILURE, UNSP W HYPOXIA OR

HYPERCAPNIA   Status: Acute   





(2) Pneumonia due to 2019 novel coronavirus


Code(s): U07.1 - COVID-19; J12.82 - PNEUMONIA DUE TO CORONAVIRUS DISEASE 2019   

Status: Acute   





(3) DVT (deep venous thrombosis)


Code(s): I82.409 - ACUTE EMBOLISM AND THOMBOS UNSP DEEP VN UNSP LOWER EXTREMITY 

 Status: Acute   


Qualifiers: 


   DVT location: lower extremity   Chronicity: acute   Laterality: bilateral 





(4) Urinary tract infection


Status: Acute   


Qualifiers: 


   Urinary tract infection type: acute cystitis   Hematuria presence: without 

hematuria   Qualified Code(s): N30.00 - Acute cystitis without hematuria   





(5) KENAN (acute kidney injury)


Code(s): N17.9 - ACUTE KIDNEY FAILURE, UNSPECIFIED   Status: Acute   





(6) Acute on chronic systolic (congestive) heart failure


Code(s): I50.23 - ACUTE ON CHRONIC SYSTOLIC (CONGESTIVE) HEART FAILURE   Status:

Acute   





(7) Atrial fibrillation with rapid ventricular response


Code(s): I48.91 - UNSPECIFIED ATRIAL FIBRILLATION   Status: Acute   





(8) CKD (chronic kidney disease), stage III


Code(s): N18.3 - CHRONIC KIDNEY DISEASE, STAGE 3 (MODERATE) * DO NOT USE *   

Status: Chronic   


Qualifiers: 


   Chronic kidney disease stage 3 subtype: stage 3a (GFR 45-59)   Qualified 

Code(s): N18.31 - Chronic kidney disease, stage 3a   





(9) HTN (hypertension)


Code(s): I10 - ESSENTIAL (PRIMARY) HYPERTENSION   Status: Chronic   


Qualifiers: 


   Hypertension type: essential hypertension   Qualified Code(s): I10 - 

Essential (primary) hypertension   





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


Code(s): C61 - MALIGNANT NEOPLASM OF PROSTATE   Status: Chronic   





(11) Spinal stenosis of lumbar region


Code(s): M48.061 - SPINAL STENOSIS, LUMBAR REGION WITHOUT NEUROGENIC CASA   

Status: Chronic   


Qualifiers: 


   Neurogenic claudication status: unspecified   Qualified Code(s): M48.061 - 

Spinal stenosis, lumbar region without neurogenic claudication   





(12) GI bleed


Code(s): K92.2 - GASTROINTESTINAL HEMORRHAGE, UNSPECIFIED   Status: Resolved   





- Plan


old records reviewed/req, PT/OT, , respiratory therapy





medication reviewed and continue to provide symptomatic care, overall stable and

improving, he will need placement

## 2021-02-16 LAB
ALBUMIN SERPL BCG-MCNC: 2.5 G/DL (ref 3.4–4.8)
ALP SERPL-CCNC: 104 U/L (ref 40–110)
ALT SERPL W P-5'-P-CCNC: 14 U/L (ref 8–55)
ANION GAP SERPL CALC-SCNC: 15 MMOL/L (ref 10–20)
ANISOCYTOSIS BLD QL SMEAR: (no result) (100X)
AST SERPL-CCNC: 13 U/L (ref 5–34)
BASOPHILS # BLD AUTO: 0 THOU/UL (ref 0–0.2)
BASOPHILS NFR BLD AUTO: 0.2 % (ref 0–1)
BILIRUB SERPL-MCNC: 0.6 MG/DL (ref 0.2–1.2)
BUN SERPL-MCNC: 36 MG/DL (ref 8.4–25.7)
CALCIUM SERPL-MCNC: 7.3 MG/DL (ref 7.8–10.44)
CHLORIDE SERPL-SCNC: 106 MMOL/L (ref 98–107)
CO2 SERPL-SCNC: 24 MMOL/L (ref 23–31)
CREAT CL PREDICTED SERPL C-G-VRATE: 84 ML/MIN (ref 70–130)
EOSINOPHIL # BLD AUTO: 0.1 THOU/UL (ref 0–0.7)
EOSINOPHIL NFR BLD AUTO: 0.9 % (ref 0–10)
GLOBULIN SER CALC-MCNC: 2.1 G/DL (ref 2.4–3.5)
GLUCOSE SERPL-MCNC: 109 MG/DL (ref 83–110)
HGB BLD-MCNC: 8.8 G/DL (ref 14–18)
LYMPHOCYTES # BLD: 1 THOU/UL (ref 1.2–3.4)
LYMPHOCYTES NFR BLD AUTO: 15.1 % (ref 21–51)
MAGNESIUM SERPL-MCNC: 1.8 MG/DL (ref 1.6–2.6)
MAGNESIUM SERPL-MCNC: 1.8 MG/DL (ref 1.6–2.6)
MCH RBC QN AUTO: 31.8 PG (ref 27–31)
MCV RBC AUTO: 98.3 FL (ref 78–98)
MDIFF COMPLETE?: YES
MONOCYTES # BLD AUTO: 0.4 THOU/UL (ref 0.11–0.59)
MONOCYTES NFR BLD AUTO: 5.7 % (ref 0–10)
NEUTROPHILS # BLD AUTO: 4.9 THOU/UL (ref 1.4–6.5)
NEUTROPHILS NFR BLD AUTO: 78.2 % (ref 42–75)
PLATELET # BLD AUTO: 145 THOU/UL (ref 130–400)
POTASSIUM SERPL-SCNC: 3.1 MMOL/L (ref 3.5–5.1)
POTASSIUM SERPL-SCNC: 3.8 MMOL/L (ref 3.5–5.1)
RBC # BLD AUTO: 2.77 MILL/UL (ref 4.7–6.1)
SODIUM SERPL-SCNC: 142 MMOL/L (ref 136–145)
WBC # BLD AUTO: 6.3 THOU/UL (ref 4.8–10.8)

## 2021-02-16 RX ADMIN — Medication SCH MG: at 08:07

## 2021-02-16 RX ADMIN — MOMETASONE FUROATE AND FORMOTEROL FUMARATE DIHYDRATE SCH PUFF: 200; 5 AEROSOL RESPIRATORY (INHALATION) at 06:46

## 2021-02-16 RX ADMIN — Medication SCH ML: at 21:08

## 2021-02-16 RX ADMIN — MOMETASONE FUROATE AND FORMOTEROL FUMARATE DIHYDRATE SCH: 200; 5 AEROSOL RESPIRATORY (INHALATION) at 10:46

## 2021-02-16 RX ADMIN — MULTIPLE VITAMINS W/ MINERALS TAB SCH TAB: TAB at 08:08

## 2021-02-16 RX ADMIN — Medication SCH ML: at 08:09

## 2021-02-16 RX ADMIN — ASPIRIN 81 MG CHEWABLE TABLET SCH MG: 81 TABLET CHEWABLE at 21:07

## 2021-02-16 RX ADMIN — CYANOCOBALAMIN TAB 1000 MCG SCH MCG: 1000 TAB at 21:10

## 2021-02-16 RX ADMIN — MOMETASONE FUROATE AND FORMOTEROL FUMARATE DIHYDRATE SCH PUFF: 200; 5 AEROSOL RESPIRATORY (INHALATION) at 17:19

## 2021-02-16 NOTE — PRG
DATE OF SERVICE:  02/16/2021



SUBJECTIVE:  Randy Link is awake, alert, responsive.  No distress. 



Cardiology is treating his cardiomyopathy.



OBJECTIVE:  VITAL SIGNS:  Stable with saturations of 98 on 1 L, temperature 97,

pulse 70, respiratory rate 20, and blood pressure 90/62. 

CHEST:  No wheezing.  No crackles. 

CARDIAC:  Normal __________.



LABORATORY DATA:  Only pertinent for potassium of 3.1, BUN is elevated at 36.



ASSESSMENT AND PLAN:  Cardiomyopathy, corona positive pneumonia, pleural effusion. 



Taper and discontinue his steroids.  We would also consider discontinuing his

colchicine at this stage.  It appears most of his problems are related to his

cardiomyopathy.  His x-ray __________ much improved.  Nonspecific infiltrates. 



We will follow at a distance.







Job ID:  [id number]

## 2021-02-16 NOTE — PDOC.HOSPP
- Subjective


Encounter Date: 02/16/21


Encounter Time: 09:10


Subjective: 


Patient seen and examined. No new complaints. No overnight events





- Objective


Vital Signs & Weight: 


                             Vital Signs (12 hours)











  Temp Pulse Resp BP Pulse Ox


 


 02/16/21 08:00      98


 


 02/16/21 07:35  97.6 F  71  20  98/62  100


 


 02/16/21 04:30  97.9 F  57 L  22 H  91/56 L  98








                                     Weight











Admit Weight                   201 lb


 


Weight                         186 lb 4.65 oz














I&O: 


                                        











 02/15/21 02/16/21 02/17/21





 06:59 06:59 06:59


 


Intake Total 717 290 


 


Output Total 200 400 75


 


Balance 517 -110 -75











Result Diagrams: 


                                 02/16/21 04:01





                                 02/16/21 04:01


EKG Reviewed by me: Yes





Hospitalist ROS





- Review of Systems


Constitutional: reports: weakness, malaise.  denies: fever, chills, sweats, 

other


Respiratory: denies: cough, dry, shortness of breath, hemoptysis, SOB with 

excertion, pleuritic pain, sputum, wheezing, other


Cardiovascular: denies: chest pain, palpitations, orthopnea, paroxysmal noc. 

dyspnea, edema, light headedness, other


Gastrointestinal: denies: nausea, vomiting, abdominal pain, diarrhea, 

constipation, melena, hematochezia, other


Genitourinary: denies: dysuria, frequency, incontinence, hematuria, retention, 

other


Musculoskeletal: denies: neck pain, shoulder pain, arm pain, back pain, hand 

pain, leg pain, foot pain, other





- Medication


Medications: 


Active Medications











Generic Name Dose Route Start Last Admin





  Trade Name Fernandoq  PRN Reason Stop Dose Admin


 


Acetaminophen  650 mg  01/29/21 03:46  02/02/21 08:46





  Acetaminophen 325 Mg Tab  PO   650 mg





  Q4H PRN   Administration





  Headache/Fever/Mild Pain (1-3)  


 


Apixaban  5 mg  02/09/21 21:00  02/16/21 08:07





  Apixaban 5 Mg Tab  PO   5 mg





  BID AMANDA   Administration


 


Ascorbic Acid  1,000 mg  01/29/21 09:00  02/16/21 08:07





  Ascorbic Acid 500 Mg Chewable Tablet  PO   1,000 mg





  DAILY AMANDA   Administration


 


Aspirin  81 mg  01/29/21 21:00  02/15/21 22:55





  Aspirin Chewable 81 Mg Tab  PO   81 mg





  HS AMANDA   Administration


 


Colchicine  0.6 mg  01/29/21 09:00  02/16/21 08:08





  Colchicine 0.6 Mg Tab  PO   0.6 mg





  BID AMANDA   Administration


 


Cyanocobalamin  1,000 mcg  02/10/21 21:00  02/15/21 22:55





  Cyanocobalamin (Vitamin B-12) 1,000 Mcg Tab  PO   1,000 mcg





  HS AMANDA   Administration


 


Ferrous Sulfate  325 mg  02/11/21 08:00  02/16/21 08:06





  Ferrous Sulfate 325 Mg Tab  PO   325 mg





  QAM-WM AMANDA   Administration


 


Folic Acid  1 mg  02/11/21 09:00  02/16/21 08:08





  Folic Acid 1 Mg Tab  PO   1 mg





  DAILY AMANDA   Administration


 


Furosemide  20 mg  02/11/21 09:00  02/16/21 08:08





  Furosemide 20 Mg Tab  PO   20 mg





  DAILY AMANDA   Administration


 


Guaifenesin/Dextromethorphan  15 ml  01/29/21 03:46  01/29/21 11:37





  Guaifenesin Dm 100-10/5 Ml Udcup  PO   15 ml





  Q4H PRN   Administration





  Cough  


 


Potassium Phosphate 30 mmol/  260 mls @ 41.7 mls/hr  02/16/21 07:30  02/16/21 

10:47





  Sodium Chloride  IVPB  02/16/21 14:00  260 mls





  NOW AMANDA   Administration


 


Iron/Minerals/Multivitamins  1 tab  02/11/21 09:00  02/16/21 08:08





  Multivitamin W/ Minerals 1 Tab  PO   1 tab





  DAILY AMANDA   Administration


 


Ivabradine  2.5 mg  02/11/21 21:00  02/16/21 08:08





  Ivabradine 5 Mg Tab  PO   2.5 mg





  BID AMANDA   Administration


 


Melatonin  3 mg  02/10/21 08:07  02/15/21 22:55





  Melatonin 3 Mg Tab  PO   3 mg





  HS PRN   Administration





  Insomnia  


 


Midodrine  5 mg  02/12/21 09:00  02/16/21 08:08





  Midodrine Hcl 5 Mg Tab  PO   5 mg





  TID AMANDA   Administration


 


Mometasone Furoate/Formoterol Fumar  2 puff  02/09/21 18:30  02/16/21 10:46





  Mometasone 200 Mcg/Formoterol 5 Mcg 120 Puff Inhaler  INH   Not Given





  BID-RT AMANDA  


 


Pantoprazole Sodium  40 mg  02/11/21 09:00  02/16/21 08:09





  Pantoprazole 40 Mg Tab  PO   40 mg





  Q12HR AMANDA   Administration


 


Phenol  0 ml  02/08/21 15:46  02/08/21 16:22





  Chloraseptic Spray 180 Ml Bottle  PO   2 spr





  Q1H PRN   Administration





  SORE THROAT  


 


Potassium Chloride  16 meq  02/04/21 08:00  02/16/21 08:06





  Potassium Chloride 8 Meq Tab  PO   16 meq





  QAM-WM AMANDA   Administration


 


Saccharomyces Boulardii  250 mg  02/11/21 09:00  02/16/21 08:09





  Saccharomyces Boulardii 250 Mg Cap  PO   250 mg





  DAILY AMANDA   Administration


 


Sodium Chloride  10 ml  02/01/21 21:00  02/16/21 08:09





  Flush - Normal Saline 10 Ml Syringe  IVF   10 ml





  Q12HR AMANDA   Administration


 


Sodium Chloride  10 ml  02/01/21 13:30  02/06/21 02:11





  Flush - Normal Saline 10 Ml Syringe  IVF   10 ml





  PRN PRN   Administration





  Saline Flush  


 


Zinc Sulfate  220 mg  01/29/21 09:00  02/16/21 08:09





  Zinc Sulfate 220 Mg Cap  PO   220 mg





  DAILY AMANDA   Administration














Hospitalist Exam


Vitals: 


                             Vital Signs (12 hours)











  Temp Pulse Resp BP Pulse Ox


 


 02/16/21 08:00      98


 


 02/16/21 07:35  97.6 F  71  20  98/62  100


 


 02/16/21 04:30  97.9 F  57 L  22 H  91/56 L  98








                                     Weight











Admit Weight                   201 lb


 


Weight                         186 lb 4.65 oz














General Appearance: NAD, ill appearing


Eye: PERRL, anicteric sclera


ENT: normocephalic atraumatic, no oropharyngeal lesions


Neck: supple, symmetric, no JVD


Heart: RRR, no murmur, no gallops, no rubs


Respiratory: no wheezes, no rales, no ronchi


Gastrointestinal: soft, non-tender, non-distended, normal bowel sounds


Extremities: no clubbing, no edema


Skin: normal turgor, no lesions


Neurological - other findings: Patient is weak in both lower extremity


Musculoskeletal: generalized weakness


Psychiatric: normal affect, normal behavior





Hosp A/P


(1) Acute respiratory failure due to COVID-19


Code(s): U07.1 - COVID-19; J96.00 - ACUTE RESPIRATORY FAILURE, UNSP W HYPOXIA OR

HYPERCAPNIA   Status: Acute   





(2) Pneumonia due to 2019 novel coronavirus


Code(s): U07.1 - COVID-19; J12.82 - PNEUMONIA DUE TO CORONAVIRUS DISEASE 2019   

Status: Acute   





(3) DVT (deep venous thrombosis)


Code(s): I82.409 - ACUTE EMBOLISM AND THOMBOS UNSP DEEP VN UNSP LOWER EXTREMITY 

 Status: Acute   


Qualifiers: 


   DVT location: lower extremity   Chronicity: acute   Laterality: bilateral 





(4) Urinary tract infection


Status: Acute   


Qualifiers: 


   Urinary tract infection type: acute cystitis   Hematuria presence: without 

hematuria   Qualified Code(s): N30.00 - Acute cystitis without hematuria   





(5) KENAN (acute kidney injury)


Code(s): N17.9 - ACUTE KIDNEY FAILURE, UNSPECIFIED   Status: Acute   





(6) Acute on chronic systolic (congestive) heart failure


Code(s): I50.23 - ACUTE ON CHRONIC SYSTOLIC (CONGESTIVE) HEART FAILURE   Status:

Acute   





(7) Atrial fibrillation with rapid ventricular response


Code(s): I48.91 - UNSPECIFIED ATRIAL FIBRILLATION   Status: Acute   





(8) CKD (chronic kidney disease), stage III


Code(s): N18.3 - CHRONIC KIDNEY DISEASE, STAGE 3 (MODERATE) * DO NOT USE *   

Status: Chronic   


Qualifiers: 


   Chronic kidney disease stage 3 subtype: stage 3a (GFR 45-59)   Qualified Code

(s): N18.31 - Chronic kidney disease, stage 3a   





(9) HTN (hypertension)


Code(s): I10 - ESSENTIAL (PRIMARY) HYPERTENSION   Status: Chronic   


Qualifiers: 


   Hypertension type: essential hypertension   Qualified Code(s): I10 - 

Essential (primary) hypertension   





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


Code(s): C61 - MALIGNANT NEOPLASM OF PROSTATE   Status: Chronic   





(11) Spinal stenosis of lumbar region


Code(s): M48.061 - SPINAL STENOSIS, LUMBAR REGION WITHOUT NEUROGENIC CASA   

Status: Chronic   


Qualifiers: 


   Neurogenic claudication status: unspecified   Qualified Code(s): M48.061 - 

Spinal stenosis, lumbar region without neurogenic claudication   





(12) GI bleed


Code(s): K92.2 - GASTROINTESTINAL HEMORRHAGE, UNSPECIFIED   Status: Resolved   





- Plan


old records reviewed/req, PT/OT, 





Continue Eliquis


Continue to wean oxygen as tolerated


Patient has significant clinical improvement


Replace potassium


Patient will need eventual skilled nursing home placement


Care delay because of bad weather and placement issues

## 2021-02-17 LAB
ANION GAP SERPL CALC-SCNC: 13 MMOL/L (ref 10–20)
BUN SERPL-MCNC: 33 MG/DL (ref 8.4–25.7)
CALCIUM SERPL-MCNC: 6.9 MG/DL (ref 7.8–10.44)
CHLORIDE SERPL-SCNC: 107 MMOL/L (ref 98–107)
CO2 SERPL-SCNC: 25 MMOL/L (ref 23–31)
CREAT CL PREDICTED SERPL C-G-VRATE: 87 ML/MIN (ref 70–130)
GLUCOSE SERPL-MCNC: 96 MG/DL (ref 83–110)
POTASSIUM SERPL-SCNC: 3.1 MMOL/L (ref 3.5–5.1)
SODIUM SERPL-SCNC: 142 MMOL/L (ref 136–145)

## 2021-02-17 RX ADMIN — CYANOCOBALAMIN TAB 1000 MCG SCH MCG: 1000 TAB at 20:35

## 2021-02-17 RX ADMIN — Medication SCH ML: at 20:34

## 2021-02-17 RX ADMIN — ASPIRIN 81 MG CHEWABLE TABLET SCH MG: 81 TABLET CHEWABLE at 20:35

## 2021-02-17 RX ADMIN — MULTIPLE VITAMINS W/ MINERALS TAB SCH TAB: TAB at 08:01

## 2021-02-17 RX ADMIN — Medication SCH ML: at 13:05

## 2021-02-17 RX ADMIN — Medication SCH MG: at 07:55

## 2021-02-17 RX ADMIN — MOMETASONE FUROATE AND FORMOTEROL FUMARATE DIHYDRATE SCH PUFF: 200; 5 AEROSOL RESPIRATORY (INHALATION) at 16:59

## 2021-02-17 RX ADMIN — MOMETASONE FUROATE AND FORMOTEROL FUMARATE DIHYDRATE SCH PUFF: 200; 5 AEROSOL RESPIRATORY (INHALATION) at 06:54

## 2021-02-17 NOTE — PDOC.HOSPP
- Subjective


Encounter Date: 02/17/21


Subjective: 


reports  to be doing well





- Objective


Vital Signs & Weight: 


                             Vital Signs (12 hours)











  Temp Pulse Resp BP Pulse Ox


 


 02/17/21 08:20  98.0 F  104 H  20  89/54 L  96


 


 02/17/21 08:00      96


 


 02/17/21 05:00  98.5 F  96  18  88/50 L  99


 


 02/17/21 01:00  98.4 F  100  18  101/59 L  98








                                     Weight











Admit Weight                   201 lb


 


Weight                         186 lb 4.65 oz














I&O: 


                                        











 02/16/21 02/17/21 02/18/21





 06:59 06:59 06:59


 


Intake Total 290 310 


 


Output Total 400 425 125


 


Balance -110 -115 -125











Result Diagrams: 


                                 02/18/21 04:37





                                 02/18/21 04:37





Hospitalist ROS





- Medication


Medications: 


Active Medications











Generic Name Dose Route Start Last Admin





  Trade Name Freq  PRN Reason Stop Dose Admin


 


Acetaminophen  650 mg  01/29/21 03:46  02/02/21 08:46





  Acetaminophen 325 Mg Tab  PO   650 mg





  Q4H PRN   Administration





  Headache/Fever/Mild Pain (1-3)  


 


Apixaban  5 mg  02/09/21 21:00  02/17/21 08:00





  Apixaban 5 Mg Tab  PO   5 mg





  BID AMANDA   Administration


 


Ascorbic Acid  1,000 mg  01/29/21 09:00  02/17/21 07:55





  Ascorbic Acid 500 Mg Chewable Tablet  PO   1,000 mg





  DAILY AMANDA   Administration


 


Aspirin  81 mg  01/29/21 21:00  02/16/21 21:07





  Aspirin Chewable 81 Mg Tab  PO   81 mg





  HS AMANDA   Administration


 


Colchicine  0.6 mg  01/29/21 09:00  02/17/21 07:54





  Colchicine 0.6 Mg Tab  PO   0.6 mg





  BID AMANDA   Administration


 


Cyanocobalamin  1,000 mcg  02/10/21 21:00  02/16/21 21:10





  Cyanocobalamin (Vitamin B-12) 1,000 Mcg Tab  PO   1,000 mcg





  HS AMANDA   Administration


 


Ferrous Sulfate  325 mg  02/11/21 08:00  02/17/21 08:01





  Ferrous Sulfate 325 Mg Tab  PO   325 mg





  QAM-WM AMANDA   Administration


 


Folic Acid  1 mg  02/11/21 09:00  02/17/21 08:00





  Folic Acid 1 Mg Tab  PO   1 mg





  DAILY AMANDA   Administration


 


Furosemide  20 mg  02/11/21 09:00  02/16/21 08:08





  Furosemide 20 Mg Tab  PO   20 mg





  DAILY AMANDA   Administration


 


Guaifenesin/Dextromethorphan  15 ml  01/29/21 03:46  01/29/21 11:37





  Guaifenesin Dm 100-10/5 Ml Udcup  PO   15 ml





  Q4H PRN   Administration





  Cough  


 


Iron/Minerals/Multivitamins  1 tab  02/11/21 09:00  02/17/21 08:01





  Multivitamin W/ Minerals 1 Tab  PO   1 tab





  DAILY AMANDA   Administration


 


Ivabradine  2.5 mg  02/11/21 21:00  02/17/21 07:53





  Ivabradine 5 Mg Tab  PO   2.5 mg





  BID AMANDA   Administration


 


Melatonin  3 mg  02/10/21 08:07  02/15/21 22:55





  Melatonin 3 Mg Tab  PO   3 mg





  HS PRN   Administration





  Insomnia  


 


Midodrine  10 mg  02/17/21 09:00  02/17/21 07:54





  Midodrine Hcl 5 Mg Tab  PO   10 mg





  TID AMANDA   Administration


 


Mometasone Furoate/Formoterol Fumar  2 puff  02/09/21 18:30  02/17/21 06:54





  Mometasone 200 Mcg/Formoterol 5 Mcg 120 Puff Inhaler  INH   2 puff





  BID-RT AMANDA   Administration


 


Pantoprazole Sodium  40 mg  02/11/21 09:00  02/17/21 08:00





  Pantoprazole 40 Mg Tab  PO   40 mg





  Q12HR AMANDA   Administration


 


Phenol  0 ml  02/08/21 15:46  02/08/21 16:22





  Chloraseptic Spray 180 Ml Bottle  PO   2 spr





  Q1H PRN   Administration





  SORE THROAT  


 


Potassium Chloride  16 meq  02/04/21 08:00  02/17/21 07:56





  Potassium Chloride 8 Meq Tab  PO   16 meq





  QAM-WM AMANDA   Administration


 


Prednisone  10 mg  02/17/21 08:00  02/17/21 08:00





  Prednisone 5 Mg Tab  PO  02/19/21 08:01  10 mg





  QAM-WM AMANDA   Administration


 


Saccharomyces Boulardii  250 mg  02/11/21 09:00  02/17/21 08:00





  Saccharomyces Boulardii 250 Mg Cap  PO   250 mg





  DAILY AMANDA   Administration


 


Sodium Chloride  10 ml  02/01/21 21:00  02/16/21 21:08





  Flush - Normal Saline 10 Ml Syringe  IVF   10 ml





  Q12HR AMANDA   Administration


 


Sodium Chloride  10 ml  02/01/21 13:30  02/06/21 02:11





  Flush - Normal Saline 10 Ml Syringe  IVF   10 ml





  PRN PRN   Administration





  Saline Flush  


 


Zinc Sulfate  220 mg  01/29/21 09:00  02/17/21 08:00





  Zinc Sulfate 220 Mg Cap  PO   220 mg





  DAILY AMANDA   Administration














Hospitalist Exam


Vitals: 


                             Vital Signs (12 hours)











  Temp Pulse Resp BP Pulse Ox


 


 02/17/21 08:20  98.0 F  104 H  20  89/54 L  96


 


 02/17/21 08:00      96


 


 02/17/21 05:00  98.5 F  96  18  88/50 L  99


 


 02/17/21 01:00  98.4 F  100  18  101/59 L  98








                                     Weight











Admit Weight                   201 lb


 


Weight                         186 lb 4.65 oz














General Appearance: NAD


Eye: PERRL, anicteric sclera


ENT: normocephalic atraumatic, no oropharyngeal lesions


Neck: supple, symmetric, no JVD, no thyromegaly


Heart: RRR, no murmur, no gallops, no rubs, normal peripheral pulses


Respiratory: CTAB, no wheezes, no rales, no ronchi


Gastrointestinal: soft, non-tender, non-distended, normal bowel sounds


Extremities: 2+ LE edema





Hosp A/P


(1) Atrial fibrillation with rapid ventricular response


Code(s): I48.91 - UNSPECIFIED ATRIAL FIBRILLATION   Status: Acute   





(2) COVID-19 virus infection


Code(s): U07.1 - COVID-19   Status: Deleted   





(3) Urinary tract infection


Status: Acute   


Qualifiers: 


   Urinary tract infection type: acute cystitis   Hematuria presence: without 

hematuria   Qualified Code(s): N30.00 - Acute cystitis without hematuria   





(4) Leg edema


Code(s): R60.0 - LOCALIZED EDEMA   Status: Deleted   





(5) CHF (congestive heart failure)


Code(s): I50.9 - HEART FAILURE, UNSPECIFIED   Status: Chronic   





(6) DVT (deep venous thrombosis)


Code(s): I82.409 - ACUTE EMBOLISM AND THOMBOS UNSP DEEP VN UNSP LOWER EXTREMITY 

 Status: Acute   


Qualifiers: 


   DVT location: lower extremity   Chronicity: acute   Laterality: bilateral 





(7) GI bleed


Code(s): K92.2 - GASTROINTESTINAL HEMORRHAGE, UNSPECIFIED   Status: Resolved   





- Plan











Plan for today 2/17





Patient continues to state that he is doing he is requiring less oxygen but 

remains severely deconditioned, he is on Eliquis 5 mg p.o. twice daily.





Pulmonary consult appreciated is steroids have been decreased





Cardiology continues to manage his A. fib and diuresis.  





He does have decubitus ulcers developing.

## 2021-02-18 LAB
ANION GAP SERPL CALC-SCNC: 13 MMOL/L (ref 10–20)
ANISOCYTOSIS BLD QL SMEAR: (no result) (100X)
BASOPHILS # BLD AUTO: 0 THOU/UL (ref 0–0.2)
BASOPHILS NFR BLD AUTO: 0.6 % (ref 0–1)
BUN SERPL-MCNC: 33 MG/DL (ref 8.4–25.7)
CALCIUM SERPL-MCNC: 7.2 MG/DL (ref 7.8–10.44)
CHLORIDE SERPL-SCNC: 107 MMOL/L (ref 98–107)
CO2 SERPL-SCNC: 23 MMOL/L (ref 23–31)
CREAT CL PREDICTED SERPL C-G-VRATE: 85 ML/MIN (ref 70–130)
EOSINOPHIL # BLD AUTO: 0.1 THOU/UL (ref 0–0.7)
EOSINOPHIL NFR BLD AUTO: 1.1 % (ref 0–10)
GLUCOSE SERPL-MCNC: 93 MG/DL (ref 83–110)
HGB BLD-MCNC: 8.9 G/DL (ref 14–18)
LYMPHOCYTES # BLD: 1.1 THOU/UL (ref 1.2–3.4)
LYMPHOCYTES NFR BLD AUTO: 21.1 % (ref 21–51)
MCH RBC QN AUTO: 31.9 PG (ref 27–31)
MCV RBC AUTO: 99.7 FL (ref 78–98)
MDIFF COMPLETE?: YES
MONOCYTES # BLD AUTO: 0.4 THOU/UL (ref 0.11–0.59)
MONOCYTES NFR BLD AUTO: 7.1 % (ref 0–10)
NEUTROPHILS # BLD AUTO: 3.6 THOU/UL (ref 1.4–6.5)
NEUTROPHILS NFR BLD AUTO: 70.2 % (ref 42–75)
PLATELET # BLD AUTO: 145 THOU/UL (ref 130–400)
POLYCHROMASIA BLD QL SMEAR: (no result) (100X)
POTASSIUM SERPL-SCNC: 3.1 MMOL/L (ref 3.5–5.1)
RBC # BLD AUTO: 2.8 MILL/UL (ref 4.7–6.1)
SODIUM SERPL-SCNC: 140 MMOL/L (ref 136–145)
WBC # BLD AUTO: 5.2 THOU/UL (ref 4.8–10.8)

## 2021-02-18 RX ADMIN — CYANOCOBALAMIN TAB 1000 MCG SCH MCG: 1000 TAB at 21:19

## 2021-02-18 RX ADMIN — ASPIRIN 81 MG CHEWABLE TABLET SCH MG: 81 TABLET CHEWABLE at 21:19

## 2021-02-18 RX ADMIN — Medication SCH MG: at 07:53

## 2021-02-18 RX ADMIN — Medication SCH ML: at 21:36

## 2021-02-18 RX ADMIN — MULTIPLE VITAMINS W/ MINERALS TAB SCH: TAB at 16:22

## 2021-02-18 RX ADMIN — MOMETASONE FUROATE AND FORMOTEROL FUMARATE DIHYDRATE SCH PUFF: 200; 5 AEROSOL RESPIRATORY (INHALATION) at 18:25

## 2021-02-18 RX ADMIN — MOMETASONE FUROATE AND FORMOTEROL FUMARATE DIHYDRATE SCH PUFF: 200; 5 AEROSOL RESPIRATORY (INHALATION) at 05:44

## 2021-02-18 RX ADMIN — Medication SCH: at 16:22

## 2021-02-18 RX ADMIN — Medication SCH ML: at 08:06

## 2021-02-18 RX ADMIN — MULTIPLE VITAMINS W/ MINERALS TAB SCH TAB: TAB at 07:54

## 2021-02-18 NOTE — PDOC.HOSPP
- Subjective


Encounter Date: 02/18/21


Subjective: 


Very comfortable, he started crying while I was talking to him.





- Objective


Vital Signs & Weight: 


                             Vital Signs (12 hours)











  Temp Pulse Resp BP Pulse Ox


 


 02/18/21 08:15  97.3 F L  73  16  94/52 L  100


 


 02/18/21 05:44   62  16   98


 


 02/18/21 04:00  98.2 F  62  16  98/56 L  97








                                     Weight











Admit Weight                   201 lb


 


Weight                         186 lb 4.65 oz














I&O: 


                                        











 02/17/21 02/18/21 02/19/21





 06:59 06:59 06:59


 


Intake Total 310 750 


 


Output Total 425 515 


 


Balance -115 235 











Result Diagrams: 


                                 02/18/21 04:37





                                 02/18/21 04:37





Hospitalist ROS





- Medication


Medications: 


Active Medications











Generic Name Dose Route Start Last Admin





  Trade Name Freq  PRN Reason Stop Dose Admin


 


Acetaminophen  650 mg  01/29/21 03:46  02/02/21 08:46





  Acetaminophen 325 Mg Tab  PO   650 mg





  Q4H PRN   Administration





  Headache/Fever/Mild Pain (1-3)  


 


Apixaban  5 mg  02/09/21 21:00  02/18/21 07:53





  Apixaban 5 Mg Tab  PO   5 mg





  BID AMANDA   Administration


 


Ascorbic Acid  1,000 mg  01/29/21 09:00  02/18/21 07:53





  Ascorbic Acid 500 Mg Chewable Tablet  PO   1,000 mg





  DAILY AMANDA   Administration


 


Aspirin  81 mg  01/29/21 21:00  02/17/21 20:35





  Aspirin Chewable 81 Mg Tab  PO   81 mg





  HS AMANDA   Administration


 


Colchicine  0.6 mg  01/29/21 09:00  02/18/21 07:54





  Colchicine 0.6 Mg Tab  PO   0.6 mg





  BID AMANDA   Administration


 


Cyanocobalamin  1,000 mcg  02/10/21 21:00  02/17/21 20:35





  Cyanocobalamin (Vitamin B-12) 1,000 Mcg Tab  PO   1,000 mcg





  HS AMANDA   Administration


 


Ferrous Sulfate  325 mg  02/11/21 08:00  02/18/21 07:53





  Ferrous Sulfate 325 Mg Tab  PO   325 mg





  QAM-WM AMANDA   Administration


 


Folic Acid  1 mg  02/11/21 09:00  02/18/21 07:55





  Folic Acid 1 Mg Tab  PO   1 mg





  DAILY AMANDA   Administration


 


Furosemide  20 mg  02/11/21 09:00  02/18/21 07:53





  Furosemide 20 Mg Tab  PO   20 mg





  DAILY AMANDA   Administration


 


Guaifenesin/Dextromethorphan  15 ml  01/29/21 03:46  01/29/21 11:37





  Guaifenesin Dm 100-10/5 Ml Udcup  PO   15 ml





  Q4H PRN   Administration





  Cough  


 


Iron/Minerals/Multivitamins  1 tab  02/11/21 09:00  02/18/21 07:54





  Multivitamin W/ Minerals 1 Tab  PO   1 tab





  DAILY AMANDA   Administration


 


Ivabradine  2.5 mg  02/11/21 21:00  02/18/21 07:54





  Ivabradine 5 Mg Tab  PO   2.5 mg





  BID AMANDA   Administration


 


Melatonin  3 mg  02/10/21 08:07  02/15/21 22:55





  Melatonin 3 Mg Tab  PO   3 mg





  HS PRN   Administration





  Insomnia  


 


Midodrine  10 mg  02/17/21 09:00  02/18/21 08:04





  Midodrine Hcl 5 Mg Tab  PO   10 mg





  TID AMANDA   Administration


 


Mometasone Furoate/Formoterol Fumar  2 puff  02/09/21 18:30  02/18/21 05:44





  Mometasone 200 Mcg/Formoterol 5 Mcg 120 Puff Inhaler  INH   2 puff





  BID-RT AMANDA   Administration


 


Pantoprazole Sodium  40 mg  02/11/21 09:00  02/18/21 07:54





  Pantoprazole 40 Mg Tab  PO   40 mg





  Q12HR AMANDA   Administration


 


Phenol  0 ml  02/08/21 15:46  02/08/21 16:22





  Chloraseptic Spray 180 Ml Bottle  PO   2 spr





  Q1H PRN   Administration





  SORE THROAT  


 


Potassium Chloride  16 meq  02/04/21 08:00  02/18/21 07:55





  Potassium Chloride 8 Meq Tab  PO   16 meq





  QAM-WM AMANDA   Administration


 


Prednisone  10 mg  02/17/21 08:00  02/18/21 07:53





  Prednisone 5 Mg Tab  PO  02/19/21 08:01  10 mg





  QAM-WM AMANDA   Administration


 


Saccharomyces Boulardii  250 mg  02/11/21 09:00  02/18/21 07:54





  Saccharomyces Boulardii 250 Mg Cap  PO   250 mg





  DAILY AMANDA   Administration


 


Sodium Chloride  10 ml  02/01/21 21:00  02/18/21 08:06





  Flush - Normal Saline 10 Ml Syringe  IVF   10 ml





  Q12HR AMANDA   Administration


 


Sodium Chloride  10 ml  02/01/21 13:30  02/06/21 02:11





  Flush - Normal Saline 10 Ml Syringe  IVF   10 ml





  PRN PRN   Administration





  Saline Flush  


 


Spironolactone  25 mg  02/18/21 11:00  02/18/21 11:18





  Spironolactone 25 Mg Tab  PO  02/18/21 13:00  25 mg





  NOW AMANDA   Administration


 


Zinc Sulfate  220 mg  01/29/21 09:00  02/18/21 07:53





  Zinc Sulfate 220 Mg Cap  PO   220 mg





  DAILY AMANDA   Administration














Hospitalist Exam


Vitals: 


                             Vital Signs (12 hours)











  Temp Pulse Resp BP Pulse Ox


 


 02/18/21 08:15  97.3 F L  73  16  94/52 L  100


 


 02/18/21 05:44   62  16   98


 


 02/18/21 04:00  98.2 F  62  16  98/56 L  97








                                     Weight











Admit Weight                   201 lb


 


Weight                         186 lb 4.65 oz














General Appearance: NAD, awake alert, ill appearing


Eye: PERRL, anicteric sclera


ENT: normocephalic atraumatic, no oropharyngeal lesions


Neck: supple, symmetric, no JVD, no thyromegaly


Heart: RRR, no murmur, no gallops


Respiratory: CTAB


Gastrointestinal: soft, non-tender


Extremities: 1+ LE edema





Hosp A/P


(1) Atrial fibrillation with rapid ventricular response


Code(s): I48.91 - UNSPECIFIED ATRIAL FIBRILLATION   Status: Acute   





(2) COVID-19 virus infection


Code(s): U07.1 - COVID-19   Status: Deleted   





(3) Urinary tract infection


Status: Acute   


Qualifiers: 


   Urinary tract infection type: acute cystitis   Hematuria presence: without 

hematuria   Qualified Code(s): N30.00 - Acute cystitis without hematuria   





(4) Leg edema


Code(s): R60.0 - LOCALIZED EDEMA   Status: Deleted   





(5) CHF (congestive heart failure)


Code(s): I50.9 - HEART FAILURE, UNSPECIFIED   Status: Chronic   





(6) DVT (deep venous thrombosis)


Code(s): I82.409 - ACUTE EMBOLISM AND THOMBOS UNSP DEEP VN UNSP LOWER EXTREMITY 

 Status: Acute   


Qualifiers: 


   DVT location: lower extremity   Chronicity: acute   Laterality: bilateral 





(7) GI bleed


Code(s): K92.2 - GASTROINTESTINAL HEMORRHAGE, UNSPECIFIED   Status: Resolved   





- Plan











Plan for today 2/17





Patient continues to state that he is doing he is requiring less oxygen but 

remains severely deconditioned, he is on Eliquis 5 mg p.o. twice daily.





Pulmonary consult appreciated is steroids have been decreased





Cardiology continues to manage his A. fib and diuresis.  





He does have decubitus ulcers developing.








Plan for today 2/18





Pulmonary ---today patient is off of his oxygen, he is currently on prednisone.





Cardiac----his echocardiogram showed an EF of 35 to 40% with diastolic 

dysfunction, further recommendation as per cardiology team.





Patient is working with physical therapy.





Renal--- his potassium was low and it was replaced orally.





Patient can be transferred to skilled nursing facility when bed is available.

## 2021-02-19 LAB
ANION GAP SERPL CALC-SCNC: 13 MMOL/L (ref 10–20)
BASOPHILS # BLD AUTO: 0 THOU/UL (ref 0–0.2)
BASOPHILS NFR BLD AUTO: 0.2 % (ref 0–1)
BUN SERPL-MCNC: 32 MG/DL (ref 8.4–25.7)
CALCIUM SERPL-MCNC: 7.2 MG/DL (ref 7.8–10.44)
CHLORIDE SERPL-SCNC: 108 MMOL/L (ref 98–107)
CO2 SERPL-SCNC: 23 MMOL/L (ref 23–31)
CREAT CL PREDICTED SERPL C-G-VRATE: 79 ML/MIN (ref 70–130)
EOSINOPHIL # BLD AUTO: 0 THOU/UL (ref 0–0.7)
EOSINOPHIL NFR BLD AUTO: 0.7 % (ref 0–10)
GLUCOSE SERPL-MCNC: 84 MG/DL (ref 83–110)
HGB BLD-MCNC: 9.3 G/DL (ref 14–18)
LYMPHOCYTES # BLD: 1.1 THOU/UL (ref 1.2–3.4)
LYMPHOCYTES NFR BLD AUTO: 24.9 % (ref 21–51)
MAGNESIUM SERPL-MCNC: 1.8 MG/DL (ref 1.6–2.6)
MCH RBC QN AUTO: 32.3 PG (ref 27–31)
MCV RBC AUTO: 100 FL (ref 78–98)
MONOCYTES # BLD AUTO: 0.2 THOU/UL (ref 0.11–0.59)
MONOCYTES NFR BLD AUTO: 5.2 % (ref 0–10)
NEUTROPHILS # BLD AUTO: 3 THOU/UL (ref 1.4–6.5)
NEUTROPHILS NFR BLD AUTO: 69 % (ref 42–75)
PLATELET # BLD AUTO: 151 THOU/UL (ref 130–400)
POTASSIUM SERPL-SCNC: 3.4 MMOL/L (ref 3.5–5.1)
RBC # BLD AUTO: 2.86 MILL/UL (ref 4.7–6.1)
SODIUM SERPL-SCNC: 141 MMOL/L (ref 136–145)
WBC # BLD AUTO: 4.3 THOU/UL (ref 4.8–10.8)

## 2021-02-19 RX ADMIN — Medication SCH ML: at 21:10

## 2021-02-19 RX ADMIN — MULTIPLE VITAMINS W/ MINERALS TAB SCH TAB: TAB at 08:49

## 2021-02-19 RX ADMIN — Medication SCH MG: at 08:49

## 2021-02-19 RX ADMIN — ASPIRIN 81 MG CHEWABLE TABLET SCH MG: 81 TABLET CHEWABLE at 20:57

## 2021-02-19 RX ADMIN — MOMETASONE FUROATE AND FORMOTEROL FUMARATE DIHYDRATE SCH PUFF: 200; 5 AEROSOL RESPIRATORY (INHALATION) at 06:43

## 2021-02-19 RX ADMIN — MOMETASONE FUROATE AND FORMOTEROL FUMARATE DIHYDRATE SCH PUFF: 200; 5 AEROSOL RESPIRATORY (INHALATION) at 20:56

## 2021-02-19 RX ADMIN — Medication SCH ML: at 08:55

## 2021-02-19 RX ADMIN — CYANOCOBALAMIN TAB 1000 MCG SCH MCG: 1000 TAB at 20:57

## 2021-02-19 NOTE — PDOC.HOSPP
- Subjective


Encounter Date: 02/19/21


Subjective: 


Appears the same as yesterday reports that he is doing better.





- Objective


Vital Signs & Weight: 


                             Vital Signs (12 hours)











  Temp Pulse Resp BP Pulse Ox


 


 02/19/21 15:55  98.5 F  86  16  104/51 L  100


 


 02/19/21 08:00  98 F  79  20  134/59 L  96








                                     Weight











Admit Weight                   201 lb


 


Weight                         186 lb 4.65 oz














I&O: 


                                        











 02/18/21 02/19/21 02/20/21





 06:59 06:59 06:59


 


Intake Total 750 300 554


 


Output Total 515 650 300


 


Balance 235 -350 254











Result Diagrams: 


                                 02/19/21 04:17





                                 02/19/21 04:18





Hospitalist ROS





- Medication


Medications: 


Active Medications











Generic Name Dose Route Start Last Admin





  Trade Name Freq  PRN Reason Stop Dose Admin


 


Acetaminophen  650 mg  01/29/21 03:46  02/19/21 01:15





  Acetaminophen 325 Mg Tab  PO   650 mg





  Q4H PRN   Administration





  Headache/Fever/Mild Pain (1-3)  


 


Apixaban  5 mg  02/09/21 21:00  02/19/21 08:48





  Apixaban 5 Mg Tab  PO   5 mg





  BID AMANDA   Administration


 


Ascorbic Acid  1,000 mg  01/29/21 09:00  02/19/21 08:49





  Ascorbic Acid 500 Mg Chewable Tablet  PO   1,000 mg





  DAILY AMANDA   Administration


 


Aspirin  81 mg  01/29/21 21:00  02/18/21 21:19





  Aspirin Chewable 81 Mg Tab  PO   81 mg





  HS AMANDA   Administration


 


Colchicine  0.6 mg  01/29/21 09:00  02/19/21 08:49





  Colchicine 0.6 Mg Tab  PO   0.6 mg





  BID AMANDA   Administration


 


Cyanocobalamin  1,000 mcg  02/10/21 21:00  02/18/21 21:19





  Cyanocobalamin (Vitamin B-12) 1,000 Mcg Tab  PO   1,000 mcg





  HS AMANDA   Administration


 


Ferrous Sulfate  325 mg  02/11/21 08:00  02/19/21 08:49





  Ferrous Sulfate 325 Mg Tab  PO   325 mg





  QAM-WM AMANDA   Administration


 


Folic Acid  1 mg  02/11/21 09:00  02/19/21 08:49





  Folic Acid 1 Mg Tab  PO   1 mg





  DAILY AMANDA   Administration


 


Furosemide  20 mg  02/11/21 09:00  02/19/21 08:47





  Furosemide 20 Mg Tab  PO   20 mg





  DAILY AMANDA   Administration


 


Guaifenesin/Dextromethorphan  15 ml  01/29/21 03:46  01/29/21 11:37





  Guaifenesin Dm 100-10/5 Ml Udcup  PO   15 ml





  Q4H PRN   Administration





  Cough  


 


Iron/Minerals/Multivitamins  1 tab  02/11/21 09:00  02/19/21 08:49





  Multivitamin W/ Minerals 1 Tab  PO   1 tab





  DAILY AMANDA   Administration


 


Ivabradine  2.5 mg  02/11/21 21:00  02/19/21 08:50





  Ivabradine 5 Mg Tab  PO   2.5 mg





  BID AMANDA   Administration


 


Melatonin  3 mg  02/10/21 08:07  02/19/21 01:13





  Melatonin 3 Mg Tab  PO   3 mg





  HS PRN   Administration





  Insomnia  


 


Midodrine  10 mg  02/17/21 09:00  02/19/21 16:34





  Midodrine Hcl 5 Mg Tab  PO   Not Given





  TID AMANDA  


 


Mometasone Furoate/Formoterol Fumar  2 puff  02/09/21 18:30  02/19/21 06:43





  Mometasone 200 Mcg/Formoterol 5 Mcg 120 Puff Inhaler  INH   2 puff





  BID-RT AMANDA   Administration


 


Pantoprazole Sodium  40 mg  02/11/21 09:00  02/19/21 08:49





  Pantoprazole 40 Mg Tab  PO   40 mg





  Q12HR AMANDA   Administration


 


Phenol  0 ml  02/08/21 15:46  02/08/21 16:22





  Chloraseptic Spray 180 Ml Bottle  PO   2 spr





  Q1H PRN   Administration





  SORE THROAT  


 


Potassium Chloride  16 meq  02/04/21 08:00  02/19/21 08:59





  Potassium Chloride 8 Meq Tab  PO   16 meq





  QAM-WM AMANDA   Administration


 


Saccharomyces Boulardii  250 mg  02/11/21 09:00  02/19/21 08:47





  Saccharomyces Boulardii 250 Mg Cap  PO   250 mg





  DAILY AMANDA   Administration


 


Sodium Chloride  10 ml  02/01/21 21:00  02/19/21 08:55





  Flush - Normal Saline 10 Ml Syringe  IVF   10 ml





  Q12HR AMANDA   Administration


 


Sodium Chloride  10 ml  02/01/21 13:30  02/06/21 02:11





  Flush - Normal Saline 10 Ml Syringe  IVF   10 ml





  PRN PRN   Administration





  Saline Flush  


 


Spironolactone  25 mg  02/19/21 08:00  02/19/21 08:49





  Spironolactone 25 Mg Tab  PO   25 mg





  QAM-WM AMANDA   Administration


 


Zinc Sulfate  220 mg  01/29/21 09:00  02/19/21 08:47





  Zinc Sulfate 220 Mg Cap  PO   220 mg





  DAILY AMANDA   Administration














Hospitalist Exam


Vitals: 


                             Vital Signs (12 hours)











  Temp Pulse Resp BP Pulse Ox


 


 02/19/21 15:55  98.5 F  86  16  104/51 L  100


 


 02/19/21 08:00  98 F  79  20  134/59 L  96








                                     Weight











Admit Weight                   201 lb


 


Weight                         186 lb 4.65 oz














General Appearance: NAD


Eye: PERRL, anicteric sclera


ENT: normocephalic atraumatic


Neck: supple, symmetric, no JVD


Heart: RRR, no murmur, no gallops, no rubs


Respiratory: CTAB, no wheezes, no rales, no ronchi


Gastrointestinal: soft, non-tender, non-distended


Extremities: 1+ LE edema





Hosp A/P


(1) Atrial fibrillation with rapid ventricular response


Code(s): I48.91 - UNSPECIFIED ATRIAL FIBRILLATION   Status: Acute   





(2) COVID-19 virus infection


Code(s): U07.1 - COVID-19   Status: Deleted   





(3) Urinary tract infection


Status: Acute   


Qualifiers: 


   Urinary tract infection type: acute cystitis   Hematuria presence: without 

hematuria   Qualified Code(s): N30.00 - Acute cystitis without hematuria   





(4) Leg edema


Code(s): R60.0 - LOCALIZED EDEMA   Status: Deleted   





(5) CHF (congestive heart failure)


Code(s): I50.9 - HEART FAILURE, UNSPECIFIED   Status: Chronic   





(6) DVT (deep venous thrombosis)


Code(s): I82.409 - ACUTE EMBOLISM AND THOMBOS UNSP DEEP VN UNSP LOWER EXTREMITY 

 Status: Acute   


Qualifiers: 


   DVT location: lower extremity   Chronicity: acute   Laterality: bilateral 





(7) GI bleed


Code(s): K92.2 - GASTROINTESTINAL HEMORRHAGE, UNSPECIFIED   Status: Resolved   





- Plan











Plan for today 2/17





Patient continues to state that he is doing he is requiring less oxygen but 

remains severely deconditioned, he is on Eliquis 5 mg p.o. twice daily.





Pulmonary consult appreciated is steroids have been decreased





Cardiology continues to manage his A. fib and diuresis.  





He does have decubitus ulcers developing.








Plan for today 2/18





Pulmonary ---today patient is off of his oxygen, he is currently on prednisone.





Cardiac----his echocardiogram showed an EF of 35 to 40% with diastolic d

ysfunction, further recommendation as per cardiology team.





Patient is working with physical therapy.





Renal--- his potassium was low and it was replaced orally.





Patient can be transferred to skilled nursing facility when bed is available.








Plan for today 2/19





Patient appears to be doing the same as yesterday, his diet was upgraded today 

to regular.


His potassium is on the low side I would replace it and start him on a 

maintenance dose.


To be transferred to skilled nursing facility when bed is available.

## 2021-02-20 LAB
ANION GAP SERPL CALC-SCNC: 12 MMOL/L (ref 10–20)
BASOPHILS # BLD AUTO: 0 THOU/UL (ref 0–0.2)
BASOPHILS NFR BLD AUTO: 0.2 % (ref 0–1)
BUN SERPL-MCNC: 29 MG/DL (ref 8.4–25.7)
CALCIUM SERPL-MCNC: 7.1 MG/DL (ref 7.8–10.44)
CHLORIDE SERPL-SCNC: 112 MMOL/L (ref 98–107)
CO2 SERPL-SCNC: 22 MMOL/L (ref 23–31)
CREAT CL PREDICTED SERPL C-G-VRATE: 79 ML/MIN (ref 70–130)
EOSINOPHIL # BLD AUTO: 0 THOU/UL (ref 0–0.7)
EOSINOPHIL NFR BLD AUTO: 0.6 % (ref 0–10)
GLUCOSE SERPL-MCNC: 89 MG/DL (ref 83–110)
HGB BLD-MCNC: 8.9 G/DL (ref 14–18)
LYMPHOCYTES # BLD: 1.1 THOU/UL (ref 1.2–3.4)
LYMPHOCYTES NFR BLD AUTO: 17.1 % (ref 21–51)
MCH RBC QN AUTO: 32.5 PG (ref 27–31)
MCV RBC AUTO: 101 FL (ref 78–98)
MONOCYTES # BLD AUTO: 0.3 THOU/UL (ref 0.11–0.59)
MONOCYTES NFR BLD AUTO: 4.5 % (ref 0–10)
NEUTROPHILS # BLD AUTO: 4.8 THOU/UL (ref 1.4–6.5)
NEUTROPHILS NFR BLD AUTO: 77.7 % (ref 42–75)
PLATELET # BLD AUTO: 136 THOU/UL (ref 130–400)
POTASSIUM SERPL-SCNC: 3.3 MMOL/L (ref 3.5–5.1)
RBC # BLD AUTO: 2.75 MILL/UL (ref 4.7–6.1)
SODIUM SERPL-SCNC: 143 MMOL/L (ref 136–145)
WBC # BLD AUTO: 6.1 THOU/UL (ref 4.8–10.8)

## 2021-02-20 RX ADMIN — Medication SCH MG: at 10:30

## 2021-02-20 RX ADMIN — Medication SCH ML: at 21:03

## 2021-02-20 RX ADMIN — CYANOCOBALAMIN TAB 1000 MCG SCH MCG: 1000 TAB at 21:02

## 2021-02-20 RX ADMIN — MULTIPLE VITAMINS W/ MINERALS TAB SCH TAB: TAB at 10:31

## 2021-02-20 RX ADMIN — ASPIRIN 81 MG CHEWABLE TABLET SCH MG: 81 TABLET CHEWABLE at 21:01

## 2021-02-20 RX ADMIN — Medication SCH ML: at 10:35

## 2021-02-20 NOTE — PDOC.HOSPP
- Subjective


Encounter Date: 02/20/21 (f/u covid pneumonia)


Encounter Time: 15:08


Subjective: 


Pt has had a prolonged hospitalization due to covid pneumonia, a fib with 

rvr/now rate controlled, UTI with pseudomonas, heart failure





Pt reports he feels good.  He misses his wife and became tearful with talking 

about it.  He deneis any change in breathing, cp/n/v/abd pain.





- Objective


Vital Signs & Weight: 


                             Vital Signs (12 hours)











  Temp Pulse Resp BP Pulse Ox


 


 02/20/21 11:45  98.2 F  84  20  106/66  100


 


 02/20/21 07:51  97.4 F L  75  16  100/50 L  100


 


 02/20/21 04:38      100


 


 02/20/21 04:00  96.9 F L  63  20  93/59 L  100








                                     Weight











Admit Weight                   201 lb


 


Weight                         186 lb 4.65 oz














I&O: 


                                        











 02/19/21 02/20/21 02/21/21





 06:59 06:59 06:59


 


Intake Total 300 874 


 


Output Total 650 460 


 


Balance -350 414 











Result Diagrams: 


                                 02/20/21 05:28





                                 02/20/21 05:28


EKG Reviewed by me: Yes (tele - a fib vs WAP with 4 beats NSVT overnight)





Hospitalist ROS





- Medication


Medications: 


Active Medications











Generic Name Dose Route Start Last Admin





  Trade Name Freq  PRN Reason Stop Dose Admin


 


Acetaminophen  650 mg  01/29/21 03:46  02/19/21 01:15





  Acetaminophen 325 Mg Tab  PO   650 mg





  Q4H PRN   Administration





  Headache/Fever/Mild Pain (1-3)  


 


Apixaban  5 mg  02/09/21 21:00  02/20/21 10:32





  Apixaban 5 Mg Tab  PO   5 mg





  BID AMANDA   Administration


 


Ascorbic Acid  1,000 mg  01/29/21 09:00  02/20/21 10:30





  Ascorbic Acid 500 Mg Chewable Tablet  PO   1,000 mg





  DAILY AMANDA   Administration


 


Aspirin  81 mg  01/29/21 21:00  02/19/21 20:57





  Aspirin Chewable 81 Mg Tab  PO   81 mg





  HS AMANDA   Administration


 


Colchicine  0.6 mg  01/29/21 09:00  02/20/21 10:32





  Colchicine 0.6 Mg Tab  PO   0.6 mg





  BID AMANDA   Administration


 


Cyanocobalamin  1,000 mcg  02/10/21 21:00  02/19/21 20:57





  Cyanocobalamin (Vitamin B-12) 1,000 Mcg Tab  PO   1,000 mcg





  HS AMANDA   Administration


 


Ferrous Sulfate  325 mg  02/11/21 08:00  02/20/21 10:31





  Ferrous Sulfate 325 Mg Tab  PO   325 mg





  QAM-WM AMANDA   Administration


 


Folic Acid  1 mg  02/11/21 09:00  02/20/21 10:30





  Folic Acid 1 Mg Tab  PO   1 mg





  DAILY AMANDA   Administration


 


Furosemide  20 mg  02/11/21 09:00  02/20/21 10:31





  Furosemide 20 Mg Tab  PO   20 mg





  DAILY AMANDA   Administration


 


Guaifenesin/Dextromethorphan  15 ml  01/29/21 03:46  01/29/21 11:37





  Guaifenesin Dm 100-10/5 Ml Udcup  PO   15 ml





  Q4H PRN   Administration





  Cough  


 


Iron/Minerals/Multivitamins  1 tab  02/11/21 09:00  02/20/21 10:31





  Multivitamin W/ Minerals 1 Tab  PO   1 tab





  DAILY AMANDA   Administration


 


Ivabradine  2.5 mg  02/11/21 21:00  02/20/21 10:34





  Ivabradine 5 Mg Tab  PO   2.5 mg





  BID AMANDA   Administration


 


Melatonin  3 mg  02/10/21 08:07  02/19/21 20:57





  Melatonin 3 Mg Tab  PO   3 mg





  HS PRN   Administration





  Insomnia  


 


Midodrine  10 mg  02/17/21 09:00  02/20/21 10:30





  Midodrine Hcl 5 Mg Tab  PO   10 mg





  TID AMANDA   Administration


 


Mometasone Furoate/Formoterol Fumar  2 puff  02/09/21 18:30  02/19/21 20:56





  Mometasone 200 Mcg/Formoterol 5 Mcg 120 Puff Inhaler  INH   1 puff





  BID-RT AMANDA   Administration


 


Pantoprazole Sodium  40 mg  02/11/21 09:00  02/20/21 10:32





  Pantoprazole 40 Mg Tab  PO   40 mg





  Q12HR AMANDA   Administration


 


Phenol  0 ml  02/08/21 15:46  02/08/21 16:22





  Chloraseptic Spray 180 Ml Bottle  PO   2 spr





  Q1H PRN   Administration





  SORE THROAT  


 


Potassium Chloride  20 meq  02/20/21 08:00  02/20/21 10:30





  Potassium Chloride 20 Meq Tab  PO   20 meq





  QAM-WM AMANDA   Administration


 


Saccharomyces Boulardii  250 mg  02/11/21 09:00  02/20/21 10:31





  Saccharomyces Boulardii 250 Mg Cap  PO   250 mg





  DAILY AMANDA   Administration


 


Sodium Chloride  10 ml  02/01/21 21:00  02/20/21 10:35





  Flush - Normal Saline 10 Ml Syringe  IVF   10 ml





  Q12HR AMANDA   Administration


 


Sodium Chloride  10 ml  02/01/21 13:30  02/06/21 02:11





  Flush - Normal Saline 10 Ml Syringe  IVF   10 ml





  PRN PRN   Administration





  Saline Flush  


 


Spironolactone  25 mg  02/19/21 08:00  02/20/21 10:32





  Spironolactone 25 Mg Tab  PO   25 mg





  QAM-WM AMANDA   Administration


 


Zinc Sulfate  220 mg  01/29/21 09:00  02/20/21 10:33





  Zinc Sulfate 220 Mg Cap  PO   220 mg





  DAILY AMANDA   Administration














Hospitalist Exam


Vitals: 


                             Vital Signs (12 hours)











  Temp Pulse Resp BP Pulse Ox


 


 02/20/21 11:45  98.2 F  84  20  106/66  100


 


 02/20/21 07:51  97.4 F L  75  16  100/50 L  100


 


 02/20/21 04:38      100


 


 02/20/21 04:00  96.9 F L  63  20  93/59 L  100








                                     Weight











Admit Weight                   201 lb


 


Weight                         186 lb 4.65 oz














General Appearance: NAD


Heart: no murmur, irregular


Respiratory: no wheezes, no rales, no ronchi


Gastrointestinal: soft, non-tender, non-distended, normal bowel sounds


Extremities: no cyanosis, no clubbing, 2+ LE edema


Extremities - other findings: in his feet/LE


Psychiatric: normal affect


Psychiatric - other findings: tearful when talking about wife/home





Hosp A/P


(1) Hypokalemia


Code(s): E87.6 - HYPOKALEMIA   Status: Acute   





(2) Acute respiratory failure due to COVID-19


Code(s): U07.1 - COVID-19; J96.00 - ACUTE RESPIRATORY FAILURE, UNSP W HYPOXIA OR

HYPERCAPNIA   Status: Resolved   





(3) Atrial fibrillation with rapid ventricular response


Code(s): I48.91 - UNSPECIFIED ATRIAL FIBRILLATION   Status: Resolved   





(4) DVT (deep venous thrombosis)


Code(s): I82.409 - ACUTE EMBOLISM AND THOMBOS UNSP DEEP VN UNSP LOWER EXTREMITY 

 Status: Acute   


Qualifiers: 


   DVT location: lower extremity   Chronicity: acute   Laterality: bilateral 





(5) GI bleed


Code(s): K92.2 - GASTROINTESTINAL HEMORRHAGE, UNSPECIFIED   Status: Resolved   





(6) Acute on chronic systolic (congestive) heart failure


Code(s): I50.23 - ACUTE ON CHRONIC SYSTOLIC (CONGESTIVE) HEART FAILURE   Status:

Acute   





- Plan





COVID pneumonia - now on room air, overall has done well





Cardiology - a fib with rvr and now rate controlled.  s/p diuresis for acute on 

chronic HF with reduced EF


- appreciate Cardiology consult and med adjustment


- brief arrhythmia - replace potassium





Hypokalemia 


- start electrolyte replacement protocol - will need additional potassium today


- check magnesium level





Anemia - stable





dvt prophy - on eliquis


gi prophy - on BID protonix


code status full





pt awaiting placement


reviewed plan of care with the patient, no questions or further needs at end of 

eval.

## 2021-02-20 NOTE — PDOC.CPN
- Subjective


Date: 02/20/21


Time: 12:40


Interval history: 





Still weak, but better. No new complaints





- Review of Systems


General: reports: fatigue


Respiratory: reports: congestion, shortness of breath


Cardiovascular: reports: edema


Gastrointestinal: denies: nausea, vomiting, diarrhea, constipation, abd pain, GI

bleeding


Musculoskeletal: denies: pain, tenderness, stiffness, swelling, 

arthritis/arthralgias


Neurological: reports: weakness





- Objective


Allergies/Adverse Reactions: 


                                    Allergies











Allergy/AdvReac Type Severity Reaction Status Date / Time


 


amitriptyline Allergy  Confusion Verified 11/05/20 06:25


 


hydrochlorothiazide Allergy  Hives Verified 11/04/20 22:58


 


valsartan Allergy  Hives Verified 11/04/20 22:58


 


ACE Inhibitors AdvReac  Hives Verified 11/04/20 22:58











Visit Medications: 


                               Current Medications





Acetaminophen (Acetaminophen 325 Mg Tab)  650 mg PO Q4H PRN


   PRN Reason: Headache/Fever/Mild Pain (1-3)


   Last Admin: 02/19/21 01:15 Dose:  650 mg


   Documented by: 


Albuterol/Ipratropium (Ipratropium/Albuterol Sulfate 3 Ml Neb)  3 ml NEB [id number]

PRN


   PRN Reason: SOB &/or Wheezing


Apixaban (Apixaban 5 Mg Tab)  5 mg PO BID [facility]


   Last Admin: 02/20/21 10:32 Dose:  5 mg


   Documented by: 


Ascorbic Acid (Ascorbic Acid 500 Mg Chewable Tablet)  1,000 mg PO DAILY [facility]


   Last Admin: 02/20/21 10:30 Dose:  1,000 mg


   Documented by: 


Aspirin (Aspirin Chewable 81 Mg Tab)  81 mg PO [facility]


   Last Admin: 02/19/21 20:57 Dose:  81 mg


   Documented by: 


Benzonatate (Benzonatate 100 Mg Cap)  100 mg PO Q6H PRN


   PRN Reason: Cough


Bisacodyl (Bisacodyl 5 Mg Tab)  5 mg PO DAILY PRN


   PRN Reason: Constipation


Bisacodyl (Bisacodyl 10 Mg Supp)  10 mg PA DAILYPRN PRN


   PRN Reason: Constipation


Calcium Carbonate (Calcium Carbonate 500 Mg Chewtab)  1,000 mg PO Q4H PRN


   PRN Reason: Heartburn  or Indigestion


Colchicine (Colchicine 0.6 Mg Tab)  0.6 mg PO BID [facility]


   Last Admin: 02/20/21 10:32 Dose:  0.6 mg


   Documented by: 


Cyanocobalamin (Cyanocobalamin (Vitamin B-12) 1,000 Mcg Tab)  1,000 mcg PO HS 

[facility]


   Last Admin: 02/19/21 20:57 Dose:  1,000 mcg


   Documented by: 


Docusate Sodium (Docusate 100 Mg Cap)  100 mg PO DAILY PRN


   PRN Reason: Constipation


Ferrous Sulfate (Ferrous Sulfate 325 Mg Tab)  325 mg PO QAM-WM [facility]


   Last Admin: 02/20/21 10:31 Dose:  325 mg


   Documented by: 


Folic Acid (Folic Acid 1 Mg Tab)  1 mg PO DAILY [facility]


   Last Admin: 02/20/21 10:30 Dose:  1 mg


   Documented by: 


Furosemide (Furosemide 20 Mg Tab)  20 mg PO DAILY [facility]


   Last Admin: 02/20/21 10:31 Dose:  20 mg


   Documented by: 


Guaifenesin (Guaifenesin Sf Soln 200 Mg/10 Ml Udcup)  200 mg PO Q4H PRN


   PRN Reason: Cough


Guaifenesin/Dextromethorphan (Guaifenesin Dm 100-10/5 Ml Udcup)  15 ml PO Q4H 

PRN


   PRN Reason: Cough


   Last Admin: 01/29/21 11:37 Dose:  15 ml


   Documented by: 


Hydralazine HCl (Hydralazine 20 Mg/Ml Vial)  10 mg SLOW IVP Q4H PRN


   PRN Reason: SBP > 180 and HR < 70


Promethazine HCl 12.5 mg/ (Sodium Chloride)  50.5 mls @ 202 mls/hr IVPB Q6H PRN


   PRN Reason: Nausea/vomiting use second


Iron/Minerals/Multivitamins (Multivitamin W/ Minerals 1 Tab)  1 tab PO DAILY [facility]


   Last Admin: 02/20/21 10:31 Dose:  1 tab


   Documented by: 


Ivabradine (Ivabradine 5 Mg Tab)  2.5 mg PO BID [facility]


   Last Admin: 02/20/21 10:34 Dose:  2.5 mg


   Documented by: 


Loperamide HCl (Loperamide Hcl 2 Mg Cap)  2 mg PO PRN PRN


   PRN Reason: Diarrhea/Loose Stools


Loratadine (Loratadine 10 Mg Tab)  10 mg PO DAILYPRN PRN


   PRN Reason: Sinus Symptoms


Melatonin (Melatonin 3 Mg Tab)  3 mg PO HS PRN


   PRN Reason: Insomnia


   Last Admin: 02/19/21 20:57 Dose:  3 mg


   Documented by: 


Midodrine (Midodrine Hcl 5 Mg Tab)  10 mg PO TID [facility]


   Last Admin: 02/20/21 10:30 Dose:  10 mg


   Documented by: 


Mometasone Furoate/Formoterol Fumar (Mometasone 200 Mcg/Formoterol 5 Mcg 120 

Puff Inhaler)  2 puff INH BID-RT [facility]


   Last Admin: 02/19/21 20:56 Dose:  1 puff


   Documented by: 


Ondansetron HCl (Ondansetron Pf 4 Mg/2 Ml Vial)  4 mg IVP Q6H PRN


   PRN Reason: Nausea/Vomiting use 1st


Ondansetron HCl (Ondansetron Odt 4 Mg Tab)  4 mg PO Q6H PRN


   PRN Reason: Nausea/Vomiting


Pantoprazole Sodium (Pantoprazole 40 Mg Tab)  40 mg PO Q12HR [facility]


   Last Admin: 02/20/21 10:32 Dose:  40 mg


   Documented by: 


Phenol (Chloraseptic Spray 180 Ml Bottle)  0 ml PO Q1H PRN


   PRN Reason: SORE THROAT


   Last Admin: 02/08/21 16:22 Dose:  2 spr


   Documented by: 


Potassium Chloride (Potassium Chloride 20 Meq Tab)  20 meq PO [facility]


   Last Admin: 02/20/21 10:30 Dose:  20 meq


   Documented by: 


Saccharomyces Boulardii (Saccharomyces Boulardii 250 Mg Cap)  250 mg PO DAILY 

[facility]


   Last Admin: 02/20/21 10:31 Dose:  250 mg


   Documented by: 


Senna/Docusate Sodium (Senokot S 8.6-50 Mg Tab)  2 tab PO BID PRN


   PRN Reason: Constipation


Sodium Chloride (Flush - Normal Saline 10 Ml Syringe)  10 ml IVF Q12HR [facility]


   Last Admin: 02/20/21 10:35 Dose:  10 ml


   Documented by: 


Sodium Chloride (Flush - Normal Saline 10 Ml Syringe)  10 ml IVF PRN PRN


   PRN Reason: Saline Flush


   Last Admin: 02/06/21 02:11 Dose:  10 ml


   Documented by: 


Sodium Chloride (Sodium Chloride 0.65% Nasal 44 Ml Bot)  0 ml EA NARE QIDPRN PRN


   PRN Reason: Nasal Congestion


Spironolactone (Spironolactone 25 Mg Tab)  25 mg PO [facility]


   Last Admin: 02/20/21 10:32 Dose:  25 mg


   Documented by: 


Throat Lozenges (Cepastat Lozenges 1 Sylvia)  1 sylvia PO Q2H PRN


   PRN Reason: Sore Throat


Zinc Sulfate (Zinc Sulfate 220 Mg Cap)  220 mg PO DAILY [facility]


   Last Admin: 02/20/21 10:33 Dose:  220 mg


   Documented by: 








Vital Signs & Weight: 


                                   Vital Signs











  Temp Pulse Resp BP Pulse Ox


 


 02/20/21 07:51  97.4 F L  75  16  100/50 L  100


 


 02/20/21 04:38      100


 


 02/20/21 04:00  96.9 F L  63  20  93/59 L  100








                                        











Admit Weight                   201 lb


 


Weight                         186 lb 4.65 oz

















- Quality Measures


Condition: Atrial Fibrillation/Flutter (hx or current), Heart Failure


CV meds: Beta Blocker: No (on hold d/t hypotension ), ACE/ARB: No (Hypotension 

), ASA: Yes, Anticoagulant: Yes (Eliquis)





- Medication Contraindications


No ACE/ARB reason: Medical contraindication


No Anticoagulant reason: Medical contraindication (r/o GI bleed)





- Physical Exam


General: alert & oriented x3


HEENT: mucus membranes moist


Neck: supple neck


Cardiac: other (IRR IRR)


Lungs: decreased breath sounds


Neuro: grossly intact


Abdomen: soft


Extremities: 1+ LE edema


Musculoskeletal: no pain





- Labs


Result Diagrams: 


                                 02/20/21 05:28





                                 02/20/21 05:28


                                  Troponin/CKMB











Troponin I  0.051 ng/mL (< 0.028)  H  02/07/21  05:15    














- Assessment/Plan


Assessment/Plan: 





1. New-Onset AF


2. COVID-19 PNA


3. Cardiomyopathy: EF 35-40% 


4. Metastatic Prostate Cancer


5. UTI 


6. Bilateral DVT: Venous duplex revealed bilateral non-occlusive thrombus 

involving the deep venous system of each lower extremity, he has refused an IVC 

filter, he has been placed on PO Eliquis. 


7. GIB - resolved and seen by GI


8. Decubitis Ulcers


9. NSVT


10. Bradycardia


11. Hypokalemia





Continue supportive care. Replace potassium. Continue ACT and monitor for 

further bleeding. ? Amio, but patient with history of yfn. Will d/w RG.

## 2021-02-21 LAB
ANION GAP SERPL CALC-SCNC: 14 MMOL/L (ref 10–20)
BUN SERPL-MCNC: 27 MG/DL (ref 8.4–25.7)
CALCIUM SERPL-MCNC: 7.1 MG/DL (ref 7.8–10.44)
CHLORIDE SERPL-SCNC: 113 MMOL/L (ref 98–107)
CO2 SERPL-SCNC: 18 MMOL/L (ref 23–31)
CREAT CL PREDICTED SERPL C-G-VRATE: 84 ML/MIN (ref 70–130)
GLUCOSE SERPL-MCNC: 76 MG/DL (ref 83–110)
POTASSIUM SERPL-SCNC: 3.9 MMOL/L (ref 3.5–5.1)
SODIUM SERPL-SCNC: 141 MMOL/L (ref 136–145)

## 2021-02-21 RX ADMIN — MULTIPLE VITAMINS W/ MINERALS TAB SCH TAB: TAB at 08:58

## 2021-02-21 RX ADMIN — Medication SCH ML: at 09:00

## 2021-02-21 RX ADMIN — MOMETASONE FUROATE AND FORMOTEROL FUMARATE DIHYDRATE SCH PUFF: 200; 5 AEROSOL RESPIRATORY (INHALATION) at 08:57

## 2021-02-21 RX ADMIN — MOMETASONE FUROATE AND FORMOTEROL FUMARATE DIHYDRATE SCH: 200; 5 AEROSOL RESPIRATORY (INHALATION) at 20:57

## 2021-02-21 RX ADMIN — MOMETASONE FUROATE AND FORMOTEROL FUMARATE DIHYDRATE SCH: 200; 5 AEROSOL RESPIRATORY (INHALATION) at 20:56

## 2021-02-21 RX ADMIN — Medication SCH: at 20:57

## 2021-02-21 RX ADMIN — ASPIRIN 81 MG CHEWABLE TABLET SCH MG: 81 TABLET CHEWABLE at 20:42

## 2021-02-21 RX ADMIN — Medication SCH MG: at 08:58

## 2021-02-21 RX ADMIN — CYANOCOBALAMIN TAB 1000 MCG SCH MCG: 1000 TAB at 20:43

## 2021-02-21 NOTE — PDOC.CPN
- Subjective


Date: 02/21/21


Time: 14:00


Interval history: 





No new events. No changes.





- Objective


Allergies/Adverse Reactions: 


                                    Allergies











Allergy/AdvReac Type Severity Reaction Status Date / Time


 


amitriptyline Allergy  Confusion Verified 11/05/20 06:25


 


hydrochlorothiazide Allergy  Hives Verified 11/04/20 22:58


 


valsartan Allergy  Hives Verified 11/04/20 22:58


 


ACE Inhibitors AdvReac  Hives Verified 11/04/20 22:58











Visit Medications: 


                               Current Medications





Acetaminophen (Acetaminophen 325 Mg Tab)  650 mg PO Q4H PRN


   PRN Reason: Headache/Fever/Mild Pain (1-3)


   Last Admin: 02/19/21 01:15 Dose:  650 mg


   Documented by: 


Albuterol/Ipratropium (Ipratropium/Albuterol Sulfate 3 Ml Neb)  3 ml NEB [id number]

PRN


   PRN Reason: SOB &/or Wheezing


Apixaban (Apixaban 5 Mg Tab)  5 mg PO BID [facility]


   Last Admin: 02/21/21 08:59 Dose:  5 mg


   Documented by: 


Ascorbic Acid (Ascorbic Acid 500 Mg Chewable Tablet)  1,000 mg PO DAILY [facility]


   Last Admin: 02/21/21 08:58 Dose:  1,000 mg


   Documented by: 


Aspirin (Aspirin Chewable 81 Mg Tab)  81 mg PO [facility]


   Last Admin: 02/20/21 21:01 Dose:  81 mg


   Documented by: 


Benzonatate (Benzonatate 100 Mg Cap)  100 mg PO Q6H PRN


   PRN Reason: Cough


Bisacodyl (Bisacodyl 5 Mg Tab)  5 mg PO DAILY PRN


   PRN Reason: Constipation


Bisacodyl (Bisacodyl 10 Mg Supp)  10 mg OH DAILYPRN PRN


   PRN Reason: Constipation


Calcium Carbonate (Calcium Carbonate 500 Mg Chewtab)  1,000 mg PO Q4H PRN


   PRN Reason: Heartburn  or Indigestion


Colchicine (Colchicine 0.6 Mg Tab)  0.6 mg PO BID [facility]


   Last Admin: 02/21/21 08:58 Dose:  0.6 mg


   Documented by: 


Cyanocobalamin (Cyanocobalamin (Vitamin B-12) 1,000 Mcg Tab)  1,000 mcg PO [facility]


   Last Admin: 02/20/21 21:02 Dose:  1,000 mcg


   Documented by: 


Docusate Sodium (Docusate 100 Mg Cap)  100 mg PO DAILY PRN


   PRN Reason: Constipation


Ferrous Sulfate (Ferrous Sulfate 325 Mg Tab)  325 mg PO QAM-WM [facility]


   Last Admin: 02/21/21 08:57 Dose:  325 mg


   Documented by: 


Folic Acid (Folic Acid 1 Mg Tab)  1 mg PO DAILY [facility]


   Last Admin: 02/21/21 08:56 Dose:  1 mg


   Documented by: 


Furosemide (Furosemide 20 Mg Tab)  20 mg PO DAILY [facility]


   Last Admin: 02/21/21 08:58 Dose:  20 mg


   Documented by: 


Guaifenesin (Guaifenesin Sf Soln 200 Mg/10 Ml Udcup)  200 mg PO Q4H PRN


   PRN Reason: Cough


Guaifenesin/Dextromethorphan (Guaifenesin Dm 100-10/5 Ml Udcup)  15 ml PO Q4H 

PRN


   PRN Reason: Cough


   Last Admin: 01/29/21 11:37 Dose:  15 ml


   Documented by: 


Hydralazine HCl (Hydralazine 20 Mg/Ml Vial)  10 mg SLOW IVP Q4H PRN


   PRN Reason: SBP > 180 and HR < 70


Promethazine HCl 12.5 mg/ (Sodium Chloride)  50.5 mls @ 202 mls/hr IVPB Q6H PRN


   PRN Reason: Nausea/vomiting use second


Iron/Minerals/Multivitamins (Multivitamin W/ Minerals 1 Tab)  1 tab PO DAILY [facility]


   Last Admin: 02/21/21 08:58 Dose:  1 tab


   Documented by: 


Ivabradine (Ivabradine 5 Mg Tab)  2.5 mg PO BID [facility]


   Last Admin: 02/21/21 09:04 Dose:  2.5 mg


   Documented by: 


Loperamide HCl (Loperamide Hcl 2 Mg Cap)  2 mg PO PRN PRN


   PRN Reason: Diarrhea/Loose Stools


Loratadine (Loratadine 10 Mg Tab)  10 mg PO DAILYPRN PRN


   PRN Reason: Sinus Symptoms


Melatonin (Melatonin 3 Mg Tab)  3 mg PO HS PRN


   PRN Reason: Insomnia


   Last Admin: 02/19/21 20:57 Dose:  3 mg


   Documented by: 


Midodrine (Midodrine Hcl 5 Mg Tab)  10 mg PO TID [facility]


   Last Admin: 02/21/21 08:58 Dose:  10 mg


   Documented by: 


Miscellaneous Medication (Electrolyte Replacement Protocol)  1 each FS ASDIR [facility]


Mometasone Furoate/Formoterol Fumar (Mometasone 200 Mcg/Formoterol 5 Mcg 120 

Puff Inhaler)  2 puff INH BID-RT [facility]


   Last Admin: 02/21/21 08:57 Dose:  2 puff


   Documented by: 


Mupirocin (Mupirocin 2% Ointment 22 Gm Tube)  1 gm TOP TID [facility]


Ondansetron HCl (Ondansetron Pf 4 Mg/2 Ml Vial)  4 mg IVP Q6H PRN


   PRN Reason: Nausea/Vomiting use 1st


Ondansetron HCl (Ondansetron Odt 4 Mg Tab)  4 mg PO Q6H PRN


   PRN Reason: Nausea/Vomiting


Pantoprazole Sodium (Pantoprazole 40 Mg Tab)  40 mg PO Q12HR [facility]


   Last Admin: 02/21/21 08:56 Dose:  40 mg


   Documented by: 


Phenol (Chloraseptic Spray 180 Ml Bottle)  0 ml PO Q1H PRN


   PRN Reason: SORE THROAT


   Last Admin: 02/08/21 16:22 Dose:  2 spr


   Documented by: 


Potassium Chloride (Potassium Chloride 20 Meq Tab)  20 meq PO [facility]


   Last Admin: 02/21/21 08:56 Dose:  20 meq


   Documented by: 


Saccharomyces Boulardii (Saccharomyces Boulardii 250 Mg Cap)  250 mg PO DAILY 

[facility]


   Last Admin: 02/21/21 08:57 Dose:  250 mg


   Documented by: 


Senna/Docusate Sodium (Senokot S 8.6-50 Mg Tab)  2 tab PO BID PRN


   PRN Reason: Constipation


Sodium Chloride (Flush - Normal Saline 10 Ml Syringe)  10 ml IVF Q12HR [facility]


   Last Admin: 02/21/21 09:00 Dose:  10 ml


   Documented by: 


Sodium Chloride (Flush - Normal Saline 10 Ml Syringe)  10 ml IVF PRN PRN


   PRN Reason: Saline Flush


   Last Admin: 02/06/21 02:11 Dose:  10 ml


   Documented by: 


Sodium Chloride (Sodium Chloride 0.65% Nasal 44 Ml Bot)  0 ml EA NARE QIDPRN PRN


   PRN Reason: Nasal Congestion


Spironolactone (Spironolactone 25 Mg Tab)  25 mg PO [facility]


   Last Admin: 02/21/21 09:10 Dose:  25 mg


   Documented by: 


Throat Lozenges (Cepastat Lozenges 1 Sylvia)  1 sylvia PO Q2H PRN


   PRN Reason: Sore Throat


Zinc Sulfate (Zinc Sulfate 220 Mg Cap)  220 mg PO DAILY [facility]


   Last Admin: 02/21/21 08:56 Dose:  220 mg


   Documented by: 








Vital Signs & Weight: 


                                   Vital Signs











  Temp Pulse Resp BP Pulse Ox


 


 02/21/21 11:20  98.5 F  96  16  97/58 L  100


 


 02/21/21 08:25  98.6 F  106 H  18  98/56 L  100


 


 02/21/21 04:22      100


 


 02/21/21 03:10  98.7 F  92  26 H  122/67  100








                                        











Admit Weight                   201 lb


 


Weight                         186 lb 4.65 oz

















- Quality Measures


Condition: Atrial Fibrillation/Flutter (hx or current), Heart Failure


CV meds: Beta Blocker: No (on hold d/t hypotension ), ACE/ARB: No (Hypotension 

), ASA: Yes, Anticoagulant: Yes (Eliquis)





- Medication Contraindications


No ACE/ARB reason: Medical contraindication


No Anticoagulant reason: Medical contraindication (r/o GI bleed)





- Physical Exam


General: alert & oriented x3, no apparent distress


Neck: supple neck


Cardiac: other (IRR IRR)


Lungs: no wheezes


Neuro: grossly intact


Abdomen: soft


Skin: clear





- Labs


Result Diagrams: 


                                 02/20/21 05:28





                                 02/21/21 04:32


                                  Troponin/CKMB











Troponin I  0.051 ng/mL (< 0.028)  H  02/07/21  05:15    














- Assessment/Plan


Assessment/Plan: 





1. New-Onset AF


2. COVID-19 PNA


3. Cardiomyopathy: EF 35-40% 


4. Metastatic Prostate Cancer


5. UTI 


6. Bilateral DVT: Venous duplex revealed bilateral non-occlusive thrombus 

involving the deep venous system of each lower extremity, he has refused an IVC 

filter, he has been placed on PO Eliquis. 


7. GIB - resolved and seen by GI


8. Decubitis Ulcers


9. NSVT


10. Bradycardia


11. Hypokalemia





No further NSVT overnight. Stable and slow progress. No changes.

## 2021-02-21 NOTE — PDOC.HOSPP
- Subjective


Encounter Date: 02/21/21 (f/u covid)


Encounter Time: 12:12


Subjective: 


Pt reports he is feeling fine.  No overnight events.  Pt has been eating a lot 

of oranges.  He has an area of irritation under left nare - he denies any pain.





- Objective


Vital Signs & Weight: 


                             Vital Signs (12 hours)











  Temp Pulse Resp BP Pulse Ox


 


 02/21/21 08:25  98.6 F  106 H  18  98/56 L  100


 


 02/21/21 04:22      100


 


 02/21/21 03:10  98.7 F  92  26 H  122/67  100








                                     Weight











Admit Weight                   201 lb


 


Weight                         186 lb 4.65 oz














I&O: 


                                        











 02/20/21 02/21/21 02/22/21





 06:59 06:59 06:59


 


Intake Total 874 790 


 


Output Total 460 450 


 


Balance 414 340 











Result Diagrams: 


                                 02/20/21 05:28





                                 02/21/21 04:32


EKG Reviewed by me: Yes (tele - suns 's, WAP, ectopic beats)





Hospitalist ROS





- Medication


Medications: 


Active Medications











Generic Name Dose Route Start Last Admin





  Trade Name Freq  PRN Reason Stop Dose Admin


 


Acetaminophen  650 mg  01/29/21 03:46  02/19/21 01:15





  Acetaminophen 325 Mg Tab  PO   650 mg





  Q4H PRN   Administration





  Headache/Fever/Mild Pain (1-3)  


 


Apixaban  5 mg  02/09/21 21:00  02/21/21 08:59





  Apixaban 5 Mg Tab  PO   5 mg





  BID [facility]   Administration


 


Ascorbic Acid  1,000 mg  01/29/21 09:00  02/21/21 08:58





  Ascorbic Acid 500 Mg Chewable Tablet  PO   1,000 mg





  DAILY [facility]   Administration


 


Aspirin  81 mg  01/29/21 21:00  02/20/21 21:01





  Aspirin Chewable 81 Mg Tab  PO   81 mg





  HS [facility]   Administration


 


Colchicine  0.6 mg  01/29/21 09:00  02/21/21 08:58





  Colchicine 0.6 Mg Tab  PO   0.6 mg





  BID [facility]   Administration


 


Cyanocobalamin  1,000 mcg  02/10/21 21:00  02/20/21 21:02





  Cyanocobalamin (Vitamin B-12) 1,000 Mcg Tab  PO   1,000 mcg





  HS AM[facility]   Administration


 


Ferrous Sulfate  325 mg  02/11/21 08:00  02/21/21 08:57





  Ferrous Sulfate 325 Mg Tab  PO   325 mg





  QAM-WM [facility]   Administration


 


Folic Acid  1 mg  02/11/21 09:00  02/21/21 08:56





  Folic Acid 1 Mg Tab  PO   1 mg





  DAILY [facility]   Administration


 


Furosemide  20 mg  02/11/21 09:00  02/21/21 08:58





  Furosemide 20 Mg Tab  PO   20 mg





  DAILY [facility]   Administration


 


Guaifenesin/Dextromethorphan  15 ml  01/29/21 03:46  01/29/21 11:37





  Guaifenesin Dm 100-10/5 Ml Udcup  PO   15 ml





  Q4H PRN   Administration





  Cough  


 


Iron/Minerals/Multivitamins  1 tab  02/11/21 09:00  02/21/21 08:58





  Multivitamin W/ Minerals 1 Tab  PO   1 tab





  DAILY [facility]   Administration


 


Ivabradine  2.5 mg  02/11/21 21:00  02/21/21 09:04





  Ivabradine 5 Mg Tab  PO   2.5 mg





  BID [facility]   Administration


 


Melatonin  3 mg  02/10/21 08:07  02/19/21 20:57





  Melatonin 3 Mg Tab  PO   3 mg





  HS PRN   Administration





  Insomnia  


 


Midodrine  10 mg  02/17/21 09:00  02/21/21 08:58





  Midodrine Hcl 5 Mg Tab  PO   10 mg





  TID [facility]   Administration


 


Mometasone Furoate/Formoterol Fumar  2 puff  02/09/21 18:30  02/21/21 08:57





  Mometasone 200 Mcg/Formoterol 5 Mcg 120 Puff Inhaler  INH   2 puff





  BID-RT [facility]   Administration


 


Pantoprazole Sodium  40 mg  02/11/21 09:00  02/21/21 08:56





  Pantoprazole 40 Mg Tab  PO   40 mg





  Q12HR [facility]   Administration


 


Phenol  0 ml  02/08/21 15:46  02/08/21 16:22





  Chloraseptic Spray 180 Ml Bottle  PO   2 spr





  Q1H PRN   Administration





  SORE THROAT  


 


Potassium Chloride  20 meq  02/20/21 08:00  02/21/21 08:56





  Potassium Chloride 20 Meq Tab  PO   20 meq





  QAM-WM [facility]   Administration


 


Saccharomyces Boulardii  250 mg  02/11/21 09:00  02/21/21 08:57





  Saccharomyces Boulardii 250 Mg Cap  PO   250 mg





  DAILY [facility]   Administration


 


Sodium Chloride  10 ml  02/01/21 21:00  02/21/21 09:00





  Flush - Normal Saline 10 Ml Syringe  IVF   10 ml





  Q12HR [facility]   Administration


 


Sodium Chloride  10 ml  02/01/21 13:30  02/06/21 02:11





  Flush - Normal Saline 10 Ml Syringe  IVF   10 ml





  PRN PRN   Administration





  Saline Flush  


 


Spironolactone  25 mg  02/19/21 08:00  02/21/21 09:10





  Spironolactone 25 Mg Tab  PO   25 mg





  QAM-WM [facility]   Administration


 


Zinc Sulfate  220 mg  01/29/21 09:00  02/21/21 08:56





  Zinc Sulfate 220 Mg Cap  PO   220 mg





  DAILY [facility]   Administration














Hospitalist Exam


Vitals: 


                             Vital Signs (12 hours)











  Temp Pulse Resp BP Pulse Ox


 


 02/21/21 08:25  98.6 F  106 H  18  98/56 L  100


 


 02/21/21 04:22      100


 


 02/21/21 03:10  98.7 F  92  26 H  122/67  100








                                     Weight











Admit Weight                   201 lb


 


Weight                         186 lb 4.65 oz














General Appearance: NAD


ENT - other findings: left nare - irritation, small amount of bleeding


Heart: RRR, no murmur


Respiratory: no wheezes, no rales, no ronchi


Gastrointestinal: soft, non-tender, non-distended, normal bowel sounds


Extremities: no cyanosis, no clubbing, 2+ LE edema


Extremities - other findings: bilateral LE


Psychiatric: normal affect





Hosp A/P


(1) Hypokalemia


Code(s): E87.6 - HYPOKALEMIA   Status: Resolved   





(2) Acute respiratory failure due to COVID-19


Code(s): U07.1 - COVID-19; J96.00 - ACUTE RESPIRATORY FAILURE, UNSP W HYPOXIA OR

HYPERCAPNIA   Status: Resolved   





(3) Atrial fibrillation with rapid ventricular response


Code(s): I48.91 - UNSPECIFIED ATRIAL FIBRILLATION   Status: Resolved   





(4) DVT (deep venous thrombosis)


Code(s): I82.409 - ACUTE EMBOLISM AND THOMBOS UNSP DEEP VN UNSP LOWER EXTREMITY 

 Status: Acute   


Qualifiers: 


   DVT location: lower extremity   Chronicity: acute   Laterality: bilateral 





(5) GI bleed


Code(s): K92.2 - GASTROINTESTINAL HEMORRHAGE, UNSPECIFIED   Status: Resolved   





(6) Acute on chronic systolic (congestive) heart failure


Code(s): I50.23 - ACUTE ON CHRONIC SYSTOLIC (CONGESTIVE) HEART FAILURE   Status:

Acute   





- Plan





Pt has had a prolonged hospitalization and recovery with COVID pneumonia, new 

onset A fib with rvr, cardiomyopathy with ejection fraction 35-40%, new dx of 

bilateral dvt on eliquis.  He also has prostate cancer, a GI bleed during this 

hospitalization with evaluation by GI, low blood pressures and is on a diuretic 

and spironolactone - meds adjusted by Cardiology.





Nasal irritation - start mupirocin.  Area may be secondary to irritation from 

the oranges. Start mupirocin ointment





COVID pneumonia - now on room air, overall has done well





Cardiology - a fib with rvr and now rate controlled.  s/p diuresis for acute on 

chronic HF with reduced EF


- appreciate Cardiology consult and med adjustment


- brief arrhythmia - replace potassium





Hypokalemia - resolved with normal mag level


- continue electrolyte replacement protocol





Anemia - stable





bilateral DVT with lower extremity edema - on eliquis


- consider some light compression stockings to see if this helps with the 

swelling.  He is on daily oral lasix





dvt prophy - on eliquis


gi prophy - on BID protonix


code status full





pt awaiting placement - is ready when he is accepted.


reviewed plan of care with the patient, no questions or further needs at end of 

eval.

## 2021-02-22 LAB
ANION GAP SERPL CALC-SCNC: 13 MMOL/L (ref 10–20)
BUN SERPL-MCNC: 27 MG/DL (ref 8.4–25.7)
CALCIUM SERPL-MCNC: 7.2 MG/DL (ref 7.8–10.44)
CHLORIDE SERPL-SCNC: 111 MMOL/L (ref 98–107)
CO2 SERPL-SCNC: 22 MMOL/L (ref 23–31)
CREAT CL PREDICTED SERPL C-G-VRATE: 84 ML/MIN (ref 70–130)
GLUCOSE SERPL-MCNC: 75 MG/DL (ref 83–110)
POTASSIUM SERPL-SCNC: 3.7 MMOL/L (ref 3.5–5.1)
SODIUM SERPL-SCNC: 142 MMOL/L (ref 136–145)

## 2021-02-22 RX ADMIN — Medication SCH: at 19:16

## 2021-02-22 RX ADMIN — CYANOCOBALAMIN TAB 1000 MCG SCH MCG: 1000 TAB at 22:19

## 2021-02-22 RX ADMIN — MULTIPLE VITAMINS W/ MINERALS TAB SCH: TAB at 19:14

## 2021-02-22 RX ADMIN — Medication SCH: at 19:13

## 2021-02-22 RX ADMIN — ASPIRIN 81 MG CHEWABLE TABLET SCH MG: 81 TABLET CHEWABLE at 22:18

## 2021-02-22 RX ADMIN — Medication SCH ML: at 22:21

## 2021-02-22 RX ADMIN — MOMETASONE FUROATE AND FORMOTEROL FUMARATE DIHYDRATE SCH PUFF: 200; 5 AEROSOL RESPIRATORY (INHALATION) at 07:27

## 2021-02-22 RX ADMIN — MOMETASONE FUROATE AND FORMOTEROL FUMARATE DIHYDRATE SCH PUFF: 200; 5 AEROSOL RESPIRATORY (INHALATION) at 18:38

## 2021-02-22 NOTE — PDOC.HOSPP
- Subjective


Encounter Date: 02/22/21


Encounter Time: 10:18


Subjective: 


alert. no complaints. doing well





- Objective


Vital Signs & Weight: 


                             Vital Signs (12 hours)











  Temp Pulse Resp BP Pulse Ox


 


 02/22/21 07:34  98.1 F  85  20  93/64  100


 


 02/22/21 03:46  98.4 F  96  20  107/66  100


 


 02/22/21 03:01      100








                                     Weight











Admit Weight                   201 lb


 


Weight                         186 lb 4.65 oz














I&O: 


                                        











 02/21/21 02/22/21 02/23/21





 06:59 06:59 06:59


 


Intake Total 790  


 


Output Total 450 300 


 


Balance 340 -300 











Result Diagrams: 


                                 02/20/21 05:28





                                 02/22/21 04:17





Hospitalist ROS





- Medication


Medications: 


Active Medications











Generic Name Dose Route Start Last Admin





  Trade Name Freq  PRN Reason Stop Dose Admin


 


Acetaminophen  650 mg  01/29/21 03:46  02/19/21 01:15





  Acetaminophen 325 Mg Tab  PO   650 mg





  Q4H PRN   Administration





  Headache/Fever/Mild Pain (1-3)  


 


Apixaban  5 mg  02/09/21 21:00  02/21/21 20:43





  Apixaban 5 Mg Tab  PO   5 mg





  BID [facility]   Administration


 


Ascorbic Acid  1,000 mg  01/29/21 09:00  02/21/21 08:58





  Ascorbic Acid 500 Mg Chewable Tablet  PO   1,000 mg





  DAILY [facility]   Administration


 


Aspirin  81 mg  01/29/21 21:00  02/21/21 20:42





  Aspirin Chewable 81 Mg Tab  PO   81 mg





  HS [facility]   Administration


 


Colchicine  0.6 mg  01/29/21 09:00  02/21/21 20:43





  Colchicine 0.6 Mg Tab  PO   0.6 mg





  BID [facility]   Administration


 


Cyanocobalamin  1,000 mcg  02/10/21 21:00  02/21/21 20:43





  Cyanocobalamin (Vitamin B-12) 1,000 Mcg Tab  PO   1,000 mcg





  HS [facility]   Administration


 


Ferrous Sulfate  325 mg  02/11/21 08:00  02/21/21 08:57





  Ferrous Sulfate 325 Mg Tab  PO   325 mg





  QAM-WM [facility]   Administration


 


Folic Acid  1 mg  02/11/21 09:00  02/21/21 08:56





  Folic Acid 1 Mg Tab  PO   1 mg





  DAILY [facility]   Administration


 


Furosemide  20 mg  02/11/21 09:00  02/21/21 08:58





  Furosemide 20 Mg Tab  PO   20 mg





  DAILY [facility]   Administration


 


Guaifenesin/Dextromethorphan  15 ml  01/29/21 03:46  01/29/21 11:37





  Guaifenesin Dm 100-10/5 Ml Udcup  PO   15 ml





  Q4H PRN   Administration





  Cough  


 


Iron/Minerals/Multivitamins  1 tab  02/11/21 09:00  02/21/21 08:58





  Multivitamin W/ Minerals 1 Tab  PO   1 tab





  DAILY [facility]   Administration


 


Ivabradine  2.5 mg  02/11/21 21:00  02/21/21 20:42





  Ivabradine 5 Mg Tab  PO   2.5 mg





  BID [facility]   Administration


 


Melatonin  3 mg  02/10/21 08:07  02/19/21 20:57





  Melatonin 3 Mg Tab  PO   3 mg





  HS PRN   Administration





  Insomnia  


 


Midodrine  10 mg  02/17/21 09:00  02/21/21 20:43





  Midodrine Hcl 5 Mg Tab  PO   10 mg





  TID [facility]   Administration


 


Mometasone Furoate/Formoterol Fumar  2 puff  02/09/21 18:30  02/22/21 07:27





  Mometasone 200 Mcg/Formoterol 5 Mcg 120 Puff Inhaler  INH   2 puff





  BID-RT [facility]   Administration


 


Mupirocin  1 gm  02/21/21 15:00  02/21/21 20:58





  Mupirocin 2% Ointment 22 Gm Tube  TOP   Not Given





  TID [facility]  


 


Pantoprazole Sodium  40 mg  02/11/21 09:00  02/21/21 20:43





  Pantoprazole 40 Mg Tab  PO   40 mg





  Q12HR [facility]   Administration


 


Phenol  0 ml  02/08/21 15:46  02/08/21 16:22





  Chloraseptic Spray 180 Ml Bottle  PO   2 spr





  Q1H PRN   Administration





  SORE THROAT  


 


Potassium Chloride  20 meq  02/20/21 08:00  02/21/21 08:56





  Potassium Chloride 20 Meq Tab  PO   20 meq





  QAM-WM [facility]   Administration


 


Saccharomyces Boulardii  250 mg  02/11/21 09:00  02/21/21 08:57





  Saccharomyces Boulardii 250 Mg Cap  PO   250 mg





  DAILY [facility]   Administration


 


Sodium Chloride  10 ml  02/01/21 21:00  02/21/21 20:57





  Flush - Normal Saline 10 Ml Syringe  IVF   Not Given





  Q12HR [facility]  


 


Sodium Chloride  10 ml  02/01/21 13:30  02/06/21 02:11





  Flush - Normal Saline 10 Ml Syringe  IVF   10 ml





  PRN PRN   Administration





  Saline Flush  


 


Spironolactone  25 mg  02/19/21 08:00  02/21/21 09:10





  Spironolactone 25 Mg Tab  PO   25 mg





  QAM-WM [facility]   Administration


 


Zinc Sulfate  220 mg  01/29/21 09:00  02/21/21 08:56





  Zinc Sulfate 220 Mg Cap  PO   220 mg





  DAILY [facility]DA   Administration














Hospitalist Exam


Vitals: 


                             Vital Signs (12 hours)











  Temp Pulse Resp BP Pulse Ox


 


 02/22/21 07:34  98.1 F  85  20  93/64  100


 


 02/22/21 03:46  98.4 F  96  20  107/66  100


 


 02/22/21 03:01      100








                                     Weight











Admit Weight                   201 lb


 


Weight                         186 lb 4.65 oz














General Appearance: awake alert


Neck: no JVD


Heart: no murmur, irregular


Respiratory: CTAB


Gastrointestinal: soft, normal bowel sounds


Extremities: 2+ LE edema





Hosp A/P


(1) DVT (deep venous thrombosis)


Code(s): I82.409 - ACUTE EMBOLISM AND THOMBOS UNSP DEEP VN UNSP LOWER EXTREMITY 

 Status: Acute   


Qualifiers: 


   DVT location: lower extremity   Chronicity: acute   Laterality: bilateral 





(2) Pneumonia due to 2019 novel coronavirus


Code(s): U07.1 - COVID-19; J12.82 - PNEUMONIA DUE TO CORONAVIRUS DISEASE 2019   

Status: Chronic   





(3) Acute respiratory failure due to COVID-19


Code(s): U07.1 - COVID-19; J96.00 - ACUTE RESPIRATORY FAILURE, UNSP W HYPOXIA OR

HYPERCAPNIA   Status: Resolved   





(4) Atrial fibrillation with rapid ventricular response


Code(s): I48.91 - UNSPECIFIED ATRIAL FIBRILLATION   Status: Resolved   





(5) Acute on chronic systolic (congestive) heart failure


Code(s): I50.23 - ACUTE ON CHRONIC SYSTOLIC (CONGESTIVE) HEART FAILURE   Status:

Acute   





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


Code(s): C79.51 - SECONDARY MALIGNANT NEOPLASM OF BONE   Status: Acute   





(7) CKD (chronic kidney disease), stage III


Code(s): N18.3 - CHRONIC KIDNEY DISEASE, STAGE 3 (MODERATE) * DO NOT USE *   

Status: Chronic   


Qualifiers: 


   Chronic kidney disease stage 3 subtype: stage 3a (GFR 45-59)   Qualified 

Code(s): N18.31 - Chronic kidney disease, stage 3a   





(8) HTN (hypertension)


Code(s): I10 - ESSENTIAL (PRIMARY) HYPERTENSION   Status: Chronic   


Qualifiers: 


   Hypertension type: essential hypertension   Qualified Code(s): I10 - 

Essential (primary) hypertension   





(9) Prostate cancer metastatic to multiple sites


Code(s): C61 - MALIGNANT NEOPLASM OF PROSTATE   Status: Chronic   





- Plan





cont current meds


placement pending

## 2021-02-22 NOTE — PDOC.CPN
- Subjective


Date: 02/22/21


Time: 13:05


Interval history: 





No overnight events, patient was resting in bed with eyes closed, he is on room 

air. He denies any chest pain or shortness of breath. He states he feels very 

good today. 





- Review of Systems


General: denies: fever/chills, weight/appetite/sleep changes, night sweats, 

fatigue


Respiratory: denies: cough, congestion, shortness of breath, exercise 

intolerance


Cardiovascular: denies: chest pain, palpitation, edema, paroxysmal nocturnal 

dyspnea, orthopnea


Gastrointestinal: denies: nausea, vomiting, diarrhea, constipation, abd pain, GI

bleeding


Musculoskeletal: denies: pain, tenderness, stiffness, swelling, 

arthritis/arthralgias


Neurological: denies: numbness, syncope, seizure, weakness





- Objective


Allergies/Adverse Reactions: 


                                    Allergies











Allergy/AdvReac Type Severity Reaction Status Date / Time


 


amitriptyline Allergy  Confusion Verified 11/05/20 06:25


 


hydrochlorothiazide Allergy  Hives Verified 11/04/20 22:58


 


valsartan Allergy  Hives Verified 11/04/20 22:58


 


ACE Inhibitors AdvReac  Hives Verified 11/04/20 22:58











Visit Medications: 


                               Current Medications





Acetaminophen (Acetaminophen 325 Mg Tab)  650 mg PO Q4H PRN


   PRN Reason: Headache/Fever/Mild Pain (1-3)


   Last Admin: 02/19/21 01:15 Dose:  650 mg


   Documented by: 


Albuterol/Ipratropium (Ipratropium/Albuterol Sulfate 3 Ml Neb)  3 ml NEB [id number]

PRN


   PRN Reason: SOB &/or Wheezing


Apixaban (Apixaban 5 Mg Tab)  5 mg PO BID [facility]


   Last Admin: 02/21/21 20:43 Dose:  5 mg


   Documented by: 


Ascorbic Acid (Ascorbic Acid 500 Mg Chewable Tablet)  1,000 mg PO DAILY [facility]


   Last Admin: 02/21/21 08:58 Dose:  1,000 mg


   Documented by: 


Aspirin (Aspirin Chewable 81 Mg Tab)  81 mg PO HS [facility]


   Last Admin: 02/21/21 20:42 Dose:  81 mg


   Documented by: 


Benzonatate (Benzonatate 100 Mg Cap)  100 mg PO Q6H PRN


   PRN Reason: Cough


Bisacodyl (Bisacodyl 5 Mg Tab)  5 mg PO DAILY PRN


   PRN Reason: Constipation


Bisacodyl (Bisacodyl 10 Mg Supp)  10 mg NE DAILYPRN PRN


   PRN Reason: Constipation


Calcium Carbonate (Calcium Carbonate 500 Mg Chewtab)  1,000 mg PO Q4H PRN


   PRN Reason: Heartburn  or Indigestion


Colchicine (Colchicine 0.6 Mg Tab)  0.6 mg PO BID [facility]


   Last Admin: 02/21/21 20:43 Dose:  0.6 mg


   Documented by: 


Cyanocobalamin (Cyanocobalamin (Vitamin B-12) 1,000 Mcg Tab)  1,000 mcg PO HS 

[facility]


   Last Admin: 02/21/21 20:43 Dose:  1,000 mcg


   Documented by: 


Docusate Sodium (Docusate 100 Mg Cap)  100 mg PO DAILY PRN


   PRN Reason: Constipation


Ferrous Sulfate (Ferrous Sulfate 325 Mg Tab)  325 mg PO QA-[facility]


   Last Admin: 02/21/21 08:57 Dose:  325 mg


   Documented by: 


Folic Acid (Folic Acid 1 Mg Tab)  1 mg PO DAILY [facility]


   Last Admin: 02/21/21 08:56 Dose:  1 mg


   Documented by: 


Furosemide (Furosemide 20 Mg Tab)  20 mg PO DAILY [facility]


   Last Admin: 02/21/21 08:58 Dose:  20 mg


   Documented by: 


Guaifenesin (Guaifenesin Sf Soln 200 Mg/10 Ml Udcup)  200 mg PO Q4H PRN


   PRN Reason: Cough


Guaifenesin/Dextromethorphan (Guaifenesin Dm 100-10/5 Ml Udcup)  15 ml PO Q4H P

RN


   PRN Reason: Cough


   Last Admin: 01/29/21 11:37 Dose:  15 ml


   Documented by: 


Hydralazine HCl (Hydralazine 20 Mg/Ml Vial)  10 mg SLOW IVP Q4H PRN


   PRN Reason: SBP > 180 and HR < 70


Promethazine HCl 12.5 mg/ (Sodium Chloride)  50.5 mls @ 202 mls/hr IVPB Q6H PRN


   PRN Reason: Nausea/vomiting use second


Iron/Minerals/Multivitamins (Multivitamin W/ Minerals 1 Tab)  1 tab PO DAILY [facility]


   Last Admin: 02/21/21 08:58 Dose:  1 tab


   Documented by: 


Ivabradine (Ivabradine 5 Mg Tab)  2.5 mg PO BID [facility]


   Last Admin: 02/21/21 20:42 Dose:  2.5 mg


   Documented by: 


Loperamide HCl (Loperamide Hcl 2 Mg Cap)  2 mg PO PRN PRN


   PRN Reason: Diarrhea/Loose Stools


Loratadine (Loratadine 10 Mg Tab)  10 mg PO DAILYPRN PRN


   PRN Reason: Sinus Symptoms


Melatonin (Melatonin 3 Mg Tab)  3 mg PO HS PRN


   PRN Reason: Insomnia


   Last Admin: 02/19/21 20:57 Dose:  3 mg


   Documented by: 


Midodrine (Midodrine Hcl 5 Mg Tab)  10 mg PO TID [facility]


   Last Admin: 02/21/21 20:43 Dose:  10 mg


   Documented by: 


Miscellaneous Medication (Electrolyte Replacement Protocol)  1 each FS ASDIR [facility]


Mometasone Furoate/Formoterol Fumar (Mometasone 200 Mcg/Formoterol 5 Mcg 120 

Puff Inhaler)  2 puff INH BID-RT [facility]


   Last Admin: 02/22/21 07:27 Dose:  2 puff


   Documented by: 


Mupirocin (Mupirocin 2% Ointment 22 Gm Tube)  1 gm TOP TID [facility]


   Last Admin: 02/21/21 20:58 Dose:  Not Given


   Documented by: 


Ondansetron HCl (Ondansetron Pf 4 Mg/2 Ml Vial)  4 mg IVP Q6H PRN


   PRN Reason: Nausea/Vomiting use 1st


Ondansetron HCl (Ondansetron Odt 4 Mg Tab)  4 mg PO Q6H PRN


   PRN Reason: Nausea/Vomiting


Pantoprazole Sodium (Pantoprazole 40 Mg Tab)  40 mg PO Q12HR [facility]


   Last Admin: 02/21/21 20:43 Dose:  40 mg


   Documented by: 


Phenol (Chloraseptic Spray 180 Ml Bottle)  0 ml PO Q1H PRN


   PRN Reason: SORE THROAT


   Last Admin: 02/08/21 16:22 Dose:  2 spr


   Documented by: 


Potassium Chloride (Potassium Chloride 20 Meq Tab)  20 meq PO QAM-WM [facility]


   Last Admin: 02/21/21 08:56 Dose:  20 meq


   Documented by: 


Saccharomyces Boulardii (Saccharomyces Boulardii 250 Mg Cap)  250 mg PO DAILY 

[facility]


   Last Admin: 02/21/21 08:57 Dose:  250 mg


   Documented by: 


Senna/Docusate Sodium (Senokot S 8.6-50 Mg Tab)  2 tab PO BID PRN


   PRN Reason: Constipation


Sodium Chloride (Flush - Normal Saline 10 Ml Syringe)  10 ml IVF Q12HR [facility]


   Last Admin: 02/21/21 20:57 Dose:  Not Given


   Documented by: 


Sodium Chloride (Flush - Normal Saline 10 Ml Syringe)  10 ml IVF PRN PRN


   PRN Reason: Saline Flush


   Last Admin: 02/06/21 02:11 Dose:  10 ml


   Documented by: 


Sodium Chloride (Sodium Chloride 0.65% Nasal 44 Ml Bot)  0 ml EA NARE QIDPRN PRN


   PRN Reason: Nasal Congestion


Spironolactone (Spironolactone 25 Mg Tab)  25 mg PO QAM-WM [facility]


   Last Admin: 02/21/21 09:10 Dose:  25 mg


   Documented by: 


Throat Lozenges (Cepastat Lozenges 1 Sylvia)  1 sylvia PO Q2H PRN


   PRN Reason: Sore Throat


Zinc Sulfate (Zinc Sulfate 220 Mg Cap)  220 mg PO DAILY [facility]


   Last Admin: 02/21/21 08:56 Dose:  220 mg


   Documented by: 








Vital Signs & Weight: 


                                   Vital Signs











  Temp Pulse Pulse Pulse Resp BP BP


 


 02/22/21 11:35  97.5 F L  93    18  


 


 02/22/21 09:35    78  87   99/56 L  93/64


 


 02/22/21 07:34  98.1 F  85    20  


 


 02/22/21 03:46  98.4 F  96    20  


 


 02/22/21 03:01       














  BP Pulse Ox Pulse Ox Pulse Ox


 


 02/22/21 11:35  88/59 L  100  


 


 02/22/21 09:35    94 L  100


 


 02/22/21 07:34  93/64  100  


 


 02/22/21 03:46  107/66  100  


 


 02/22/21 03:01   100  








                                        











Admit Weight                   201 lb


 


Weight                         186 lb 4.65 oz

















- Quality Measures


Condition: Atrial Fibrillation/Flutter (hx or current), Heart Failure


CV meds: Beta Blocker: No (on hold d/t hypotension ), ACE/ARB: No (Hypotension 

), ASA: Yes, Anticoagulant: Yes (Eliquis)





- Medication Contraindications


No Beta Blocker reason: Medical contraindication (Hypotension)


No ACE/ARB reason: Medical contraindication


No Anticoagulant reason: Medical contraindication (r/o GI bleed)





- Physical Exam


General: alert & oriented x3, appears well, no apparent distress


HEENT: mucus membranes moist


Neck: no JVD/HJR, no bruit


Cardiac: no murmur, irregularly regular, S1/S2


Lungs: clear to auscultation, normal breath sounds, no wheeze, rales, rhonchi, 

other (now on room air)


Neuro: grossly intact, numbness, other (BLE numbness, remains unchanged from 

previous assessments)


Abdomen: active bowel sounds, soft, non-tender


Extremities: 1+ LE edema, other: (BLE wrapped in ACE bandages for edema)


Skin: clear, other (thin, fragile skin, scattered bruising throughout bilateral 

upper arms)


Musculoskeletal: no pain





- Labs


Result Diagrams: 


                                 02/20/21 05:28





                                 02/22/21 04:17


                                  Troponin/CKMB











Troponin I  0.051 ng/mL (< 0.028)  H  02/07/21  05:15    














- EKG Interpretation


EKG Method: Telemetry (Wandering atrial pacemaker rhythm  HR 60-80's)





- Assessment/Plan


Assessment/Plan: 





1. New-Onset AF: continues to be in wandering atrial pacemaker rhythm, HR has 

maintained in the 60-80's.


2. COVID-19 PNA improving, on room air, lungs CTA bilaterally


3. Cardiomyopathy: EF 35-40% 


4. Metastatic Prostate Cancer


5. UTI 


6. Bilateral DVT: Venous duplex revealed bilateral non-occlusive thrombus in

volving the deep venous system of each lower extremity, he has refused an IVC 

filter, he has been placed on PO Eliquis. 


7. GIB - resolved and seen by GI


8. Decubitis Ulcers


9. NSVT: no episodes of NSVT overnight


10. Bradycardia HR maintaining in the 60-80's per telemetry records.


11. Hypokalemia





Continue current treatment plan, waiting for placement. 





Pt. seen ans eval. by me. I agree with the A/P by the NP. From a cardiac 

standpoint he is stable. continue present meds. Awaiting placement. donell

## 2021-02-23 RX ADMIN — MOMETASONE FUROATE AND FORMOTEROL FUMARATE DIHYDRATE SCH PUFF: 200; 5 AEROSOL RESPIRATORY (INHALATION) at 06:42

## 2021-02-23 RX ADMIN — MULTIPLE VITAMINS W/ MINERALS TAB SCH TAB: TAB at 09:18

## 2021-02-23 RX ADMIN — Medication SCH ML: at 21:00

## 2021-02-23 RX ADMIN — Medication SCH MG: at 09:17

## 2021-02-23 RX ADMIN — ASPIRIN 81 MG CHEWABLE TABLET SCH MG: 81 TABLET CHEWABLE at 21:00

## 2021-02-23 RX ADMIN — Medication SCH ML: at 09:18

## 2021-02-23 RX ADMIN — CYANOCOBALAMIN TAB 1000 MCG SCH MCG: 1000 TAB at 20:59

## 2021-02-23 RX ADMIN — MOMETASONE FUROATE AND FORMOTEROL FUMARATE DIHYDRATE SCH PUFF: 200; 5 AEROSOL RESPIRATORY (INHALATION) at 18:23

## 2021-02-23 NOTE — PDOC.HOSPP
- Subjective


Encounter Date: 02/23/21


Encounter Time: 11:28


Subjective: 


no distress, sleeps a lot. easily aroused





- Objective


Vital Signs & Weight: 


                             Vital Signs (12 hours)











  Temp Pulse Resp BP Pulse Ox


 


 02/23/21 08:00      100


 


 02/23/21 07:45  98.9 F  89  18  108/55 L  100


 


 02/23/21 06:45      100


 


 02/23/21 06:42   83  13   100


 


 02/23/21 03:55  97.4 F L  87  20  124/88  100








                                     Weight











Admit Weight                   201 lb


 


Weight                         182 lb 12.8 oz














I&O: 


                                        











 02/22/21 02/23/21 02/24/21





 06:59 06:59 06:59


 


Intake Total  25 


 


Output Total 300 450 


 


Balance -300 -425 











Result Diagrams: 


                                 02/20/21 05:28





                                 02/22/21 04:17





Hospitalist ROS





- Medication


Medications: 


Active Medications











Generic Name Dose Route Start Last Admin





  Trade Name Freq  PRN Reason Stop Dose Admin


 


Acetaminophen  650 mg  01/29/21 03:46  02/19/21 01:15





  Acetaminophen 325 Mg Tab  PO   650 mg





  Q4H PRN   Administration





  Headache/Fever/Mild Pain (1-3)  


 


Apixaban  5 mg  02/09/21 21:00  02/23/21 09:11





  Apixaban 5 Mg Tab  PO   5 mg





  BID AMANDA   Administration


 


Ascorbic Acid  1,000 mg  01/29/21 09:00  02/23/21 09:17





  Ascorbic Acid 500 Mg Chewable Tablet  PO   1,000 mg





  DAILY AMANDA   Administration


 


Aspirin  81 mg  01/29/21 21:00  02/22/21 22:18





  Aspirin Chewable 81 Mg Tab  PO   81 mg





  HS AMANDA   Administration


 


Colchicine  0.6 mg  01/29/21 09:00  02/23/21 09:05





  Colchicine 0.6 Mg Tab  PO   0.6 mg





  BID AMANDA   Administration


 


Cyanocobalamin  1,000 mcg  02/10/21 21:00  02/22/21 22:19





  Cyanocobalamin (Vitamin B-12) 1,000 Mcg Tab  PO   1,000 mcg





  HS AMANDA   Administration


 


Ferrous Sulfate  325 mg  02/11/21 08:00  02/23/21 09:17





  Ferrous Sulfate 325 Mg Tab  PO   325 mg





  QAM-WM AMANDA   Administration


 


Folic Acid  1 mg  02/11/21 09:00  02/23/21 09:06





  Folic Acid 1 Mg Tab  PO   1 mg





  DAILY AMANDA   Administration


 


Furosemide  20 mg  02/11/21 09:00  02/23/21 09:05





  Furosemide 20 Mg Tab  PO   20 mg





  DAILY AMANDA   Administration


 


Guaifenesin/Dextromethorphan  15 ml  01/29/21 03:46  01/29/21 11:37





  Guaifenesin Dm 100-10/5 Ml Udcup  PO   15 ml





  Q4H PRN   Administration





  Cough  


 


Iron/Minerals/Multivitamins  1 tab  02/11/21 09:00  02/23/21 09:18





  Multivitamin W/ Minerals 1 Tab  PO   1 tab





  DAILY AMANDA   Administration


 


Ivabradine  2.5 mg  02/11/21 21:00  02/23/21 09:04





  Ivabradine 5 Mg Tab  PO   2.5 mg





  BID AMANDA   Administration


 


Melatonin  3 mg  02/10/21 08:07  02/19/21 20:57





  Melatonin 3 Mg Tab  PO   3 mg





  HS PRN   Administration





  Insomnia  


 


Midodrine  10 mg  02/17/21 09:00  02/23/21 09:02





  Midodrine Hcl 5 Mg Tab  PO   10 mg





  TID AMANDA   Administration


 


Mometasone Furoate/Formoterol Fumar  2 puff  02/09/21 18:30  02/23/21 06:42





  Mometasone 200 Mcg/Formoterol 5 Mcg 120 Puff Inhaler  INH   2 puff





  BID-RT AMANDA   Administration


 


Mupirocin  1 gm  02/21/21 15:00  02/23/21 09:18





  Mupirocin 2% Ointment 22 Gm Tube  TOP   1 applic





  TID AMANDA   Administration


 


Pantoprazole Sodium  40 mg  02/11/21 09:00  02/23/21 09:06





  Pantoprazole 40 Mg Tab  PO   40 mg





  Q12HR AMANDA   Administration


 


Phenol  0 ml  02/08/21 15:46  02/08/21 16:22





  Chloraseptic Spray 180 Ml Bottle  PO   2 spr





  Q1H PRN   Administration





  SORE THROAT  


 


Potassium Chloride  20 meq  02/20/21 08:00  02/23/21 09:09





  Potassium Chloride 20 Meq Tab  PO   20 meq





  QAM-WM AMANDA   Administration


 


Saccharomyces Boulardii  250 mg  02/11/21 09:00  02/23/21 09:19





  Saccharomyces Boulardii 250 Mg Cap  PO   250 mg





  DAILY AMANDA   Administration


 


Sodium Chloride  10 ml  02/01/21 21:00  02/23/21 09:18





  Flush - Normal Saline 10 Ml Syringe  IVF   10 ml





  Q12HR AMANDA   Administration


 


Sodium Chloride  10 ml  02/01/21 13:30  02/06/21 02:11





  Flush - Normal Saline 10 Ml Syringe  IVF   10 ml





  PRN PRN   Administration





  Saline Flush  


 


Spironolactone  25 mg  02/22/21 17:00  02/23/21 09:08





  Spironolactone 25 Mg Tab  PO   25 mg





  BID-WM AMANDA   Administration


 


Zinc Sulfate  220 mg  01/29/21 09:00  02/23/21 09:18





  Zinc Sulfate 220 Mg Cap  PO   220 mg





  DAILY AMANDA   Administration














Hospitalist Exam


Vitals: 


                             Vital Signs (12 hours)











  Temp Pulse Resp BP Pulse Ox


 


 02/23/21 08:00      100


 


 02/23/21 07:45  98.9 F  89  18  108/55 L  100


 


 02/23/21 06:45      100


 


 02/23/21 06:42   83  13   100


 


 02/23/21 03:55  97.4 F L  87  20  124/88  100








                                     Weight











Admit Weight                   201 lb


 


Weight                         182 lb 12.8 oz














Neck: no JVD


Heart: RRR, no murmur


Respiratory - other findings: grossly clear, poor inspiratory effort


Gastrointestinal: soft, normal bowel sounds


Extremities: 1+ LE edema





Hosp A/P


(1) DVT (deep venous thrombosis)


Code(s): I82.409 - ACUTE EMBOLISM AND THOMBOS UNSP DEEP VN UNSP LOWER EXTREMITY 

 Status: Acute   


Qualifiers: 


   DVT location: lower extremity   Chronicity: acute   Laterality: bilateral 





(2) Pneumonia due to 2019 novel coronavirus


Code(s): U07.1 - COVID-19; J12.82 - PNEUMONIA DUE TO CORONAVIRUS DISEASE 2019   

Status: Chronic   





(3) Acute respiratory failure due to COVID-19


Code(s): U07.1 - COVID-19; J96.00 - ACUTE RESPIRATORY FAILURE, UNSP W HYPOXIA OR

HYPERCAPNIA   Status: Resolved   





(4) Atrial fibrillation with rapid ventricular response


Code(s): I48.91 - UNSPECIFIED ATRIAL FIBRILLATION   Status: Resolved   





(5) Acute on chronic systolic (congestive) heart failure


Code(s): I50.23 - ACUTE ON CHRONIC SYSTOLIC (CONGESTIVE) HEART FAILURE   Status:

Acute   





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


Code(s): C79.51 - SECONDARY MALIGNANT NEOPLASM OF BONE   Status: Acute   





(7) CKD (chronic kidney disease), stage III


Code(s): N18.3 - CHRONIC KIDNEY DISEASE, STAGE 3 (MODERATE) * DO NOT USE *   

Status: Chronic   


Qualifiers: 


   Chronic kidney disease stage 3 subtype: stage 3a (GFR 45-59)   Qualified 

Code(s): N18.31 - Chronic kidney disease, stage 3a   





(8) HTN (hypertension)


Code(s): I10 - ESSENTIAL (PRIMARY) HYPERTENSION   Status: Chronic   


Qualifiers: 


   Hypertension type: essential hypertension   Qualified Code(s): I10 - 

Essential (primary) hypertension   





(9) Prostate cancer metastatic to multiple sites


Code(s): C61 - MALIGNANT NEOPLASM OF PROSTATE   Status: Chronic   





- Plan





cont current meds


placement pending

## 2021-02-23 NOTE — PDOC.CPN
- Subjective


Date: 02/23/21


Time: 13:05


Interval history: 





patient resting in bed, easily awakened, he states he feels good today. He 

denies any complaints today. He states he has no pain.





- Review of Systems


General: denies: fever/chills, weight/appetite/sleep changes, night sweats, 

fatigue


Respiratory: denies: cough, congestion, shortness of breath, exercise 

intolerance


Cardiovascular: reports: edema.  denies: chest pain, palpitation, paroxysmal 

nocturnal dyspnea, orthopnea


Gastrointestinal: denies: nausea, vomiting, diarrhea, constipation, abd pain, GI

bleeding


Musculoskeletal: denies: pain, tenderness, stiffness, swelling, 

arthritis/arthralgias


Neurological: denies: numbness, syncope, seizure, weakness





- Objective


Allergies/Adverse Reactions: 


                                    Allergies











Allergy/AdvReac Type Severity Reaction Status Date / Time


 


amitriptyline Allergy  Confusion Verified 11/05/20 06:25


 


hydrochlorothiazide Allergy  Hives Verified 11/04/20 22:58


 


valsartan Allergy  Hives Verified 11/04/20 22:58


 


ACE Inhibitors AdvReac  Hives Verified 11/04/20 22:58











Visit Medications: 


                               Current Medications





Acetaminophen (Acetaminophen 325 Mg Tab)  650 mg PO Q4H PRN


   PRN Reason: Headache/Fever/Mild Pain (1-3)


   Last Admin: 02/19/21 01:15 Dose:  650 mg


   Documented by: 


Albuterol/Ipratropium (Ipratropium/Albuterol Sulfate 3 Ml Neb)  3 ml NEB [id number]

PRN


   PRN Reason: SOB &/or Wheezing


Apixaban (Apixaban 5 Mg Tab)  5 mg PO BID [facility]


   Last Admin: 02/23/21 09:11 Dose:  5 mg


   Documented by: 


Ascorbic Acid (Ascorbic Acid 500 Mg Chewable Tablet)  1,000 mg PO DAILY [facility]


   Last Admin: 02/23/21 09:17 Dose:  1,000 mg


   Documented by: 


Aspirin (Aspirin Chewable 81 Mg Tab)  81 mg PO HS [facility]


   Last Admin: 02/22/21 22:18 Dose:  81 mg


   Documented by: 


Benzonatate (Benzonatate 100 Mg Cap)  100 mg PO Q6H PRN


   PRN Reason: Cough


Bisacodyl (Bisacodyl 5 Mg Tab)  5 mg PO DAILY PRN


   PRN Reason: Constipation


Bisacodyl (Bisacodyl 10 Mg Supp)  10 mg NM DAILYPRN PRN


   PRN Reason: Constipation


Calcium Carbonate (Calcium Carbonate 500 Mg Chewtab)  1,000 mg PO Q4H PRN


   PRN Reason: Heartburn  or Indigestion


Colchicine (Colchicine 0.6 Mg Tab)  0.6 mg PO BID [facility]


   Last Admin: 02/23/21 09:05 Dose:  0.6 mg


   Documented by: 


Cyanocobalamin (Cyanocobalamin (Vitamin B-12) 1,000 Mcg Tab)  1,000 mcg PO [facility]


   Last Admin: 02/22/21 22:19 Dose:  1,000 mcg


   Documented by: 


Docusate Sodium (Docusate 100 Mg Cap)  100 mg PO DAILY PRN


   PRN Reason: Constipation


Ferrous Sulfate (Ferrous Sulfate 325 Mg Tab)  325 mg PO QA-[facility]


   Last Admin: 02/23/21 09:17 Dose:  325 mg


   Documented by: 


Folic Acid (Folic Acid 1 Mg Tab)  1 mg PO DAILY [facility]


   Last Admin: 02/23/21 09:06 Dose:  1 mg


   Documented by: 


Furosemide (Furosemide 20 Mg Tab)  20 mg PO DAILY [facility]


   Last Admin: 02/23/21 09:05 Dose:  20 mg


   Documented by: 


Guaifenesin (Guaifenesin Sf Soln 200 Mg/10 Ml Udcup)  200 mg PO Q4H PRN


   PRN Reason: Cough


Guaifenesin/Dextromethorphan (Guaifenesin Dm 100-10/5 Ml Udcup)  15 ml PO Q4H 

PRN


   PRN Reason: Cough


   Last Admin: 01/29/21 11:37 Dose:  15 ml


   Documented by: 


Hydralazine HCl (Hydralazine 20 Mg/Ml Vial)  10 mg SLOW IVP Q4H PRN


   PRN Reason: SBP > 180 and HR < 70


Promethazine HCl 12.5 mg/ (Sodium Chloride)  50.5 mls @ 202 mls/hr IVPB Q6H PRN


   PRN Reason: Nausea/vomiting use second


Iron/Minerals/Multivitamins (Multivitamin W/ Minerals 1 Tab)  1 tab PO DAILY [facility]


   Last Admin: 02/23/21 09:18 Dose:  1 tab


   Documented by: 


Ivabradine (Ivabradine 5 Mg Tab)  2.5 mg PO BID [facility]


   Last Admin: 02/23/21 09:04 Dose:  2.5 mg


   Documented by: 


Loperamide HCl (Loperamide Hcl 2 Mg Cap)  2 mg PO PRN PRN


   PRN Reason: Diarrhea/Loose Stools


Loratadine (Loratadine 10 Mg Tab)  10 mg PO DAILYPRN PRN


   PRN Reason: Sinus Symptoms


Melatonin (Melatonin 3 Mg Tab)  3 mg PO HS PRN


   PRN Reason: Insomnia


   Last Admin: 02/19/21 20:57 Dose:  3 mg


   Documented by: 


Midodrine (Midodrine Hcl 5 Mg Tab)  10 mg PO TID [facility]


   Last Admin: 02/23/21 09:02 Dose:  10 mg


   Documented by: 


Miscellaneous Medication (Electrolyte Replacement Protocol)  1 each FS ASDIR [facility]


Mometasone Furoate/Formoterol Fumar (Mometasone 200 Mcg/Formoterol 5 Mcg 120 

Puff Inhaler)  2 puff INH BID-RT [facility]


   Last Admin: 02/23/21 06:42 Dose:  2 puff


   Documented by: 


Mupirocin (Mupirocin 2% Ointment 22 Gm Tube)  1 gm TOP TID [facility]


   Last Admin: 02/23/21 09:18 Dose:  1 applic


   Documented by: 


Ondansetron HCl (Ondansetron Pf 4 Mg/2 Ml Vial)  4 mg IVP Q6H PRN


   PRN Reason: Nausea/Vomiting use 1st


Ondansetron HCl (Ondansetron Odt 4 Mg Tab)  4 mg PO Q6H PRN


   PRN Reason: Nausea/Vomiting


Pantoprazole Sodium (Pantoprazole 40 Mg Tab)  40 mg PO Q12HR [facility]


   Last Admin: 02/23/21 09:06 Dose:  40 mg


   Documented by: 


Phenol (Chloraseptic Spray 180 Ml Bottle)  0 ml PO Q1H PRN


   PRN Reason: SORE THROAT


   Last Admin: 02/08/21 16:22 Dose:  2 spr


   Documented by: 


Potassium Chloride (Potassium Chloride 20 Meq Tab)  20 meq PO QAM-WM [facility]


   Last Admin: 02/23/21 09:09 Dose:  20 meq


   Documented by: 


Saccharomyces Boulardii (Saccharomyces Boulardii 250 Mg Cap)  250 mg PO DAILY 

[facility]


   Last Admin: 02/23/21 09:19 Dose:  250 mg


   Documented by: 


Senna/Docusate Sodium (Senokot S 8.6-50 Mg Tab)  2 tab PO BID PRN


   PRN Reason: Constipation


Sodium Chloride (Flush - Normal Saline 10 Ml Syringe)  10 ml IVF Q12HR [facility]


   Last Admin: 02/23/21 09:18 Dose:  10 ml


   Documented by: 


Sodium Chloride (Flush - Normal Saline 10 Ml Syringe)  10 ml IVF PRN PRN


   PRN Reason: Saline Flush


   Last Admin: 02/06/21 02:11 Dose:  10 ml


   Documented by: 


Sodium Chloride (Sodium Chloride 0.65% Nasal 44 Ml Bot)  0 ml EA NARE QIDPRN PRN


   PRN Reason: Nasal Congestion


Spironolactone (Spironolactone 25 Mg Tab)  25 mg PO BID-[facility]


   Last Admin: 02/23/21 09:08 Dose:  25 mg


   Documented by: 


Throat Lozenges (Cepastat Lozenges 1 Sylvia)  1 sylvia PO Q2H PRN


   PRN Reason: Sore Throat


Zinc Sulfate (Zinc Sulfate 220 Mg Cap)  220 mg PO DAILY [facility]


   Last Admin: 02/23/21 09:18 Dose:  220 mg


   Documented by: 








Vital Signs & Weight: 


                                   Vital Signs











  Temp Pulse Resp BP Pulse Ox


 


 02/23/21 11:40  98.3 F  96  20  106/68  100


 


 02/23/21 08:00      100


 


 02/23/21 07:45  98.9 F  89  18  108/55 L  100


 


 02/23/21 06:45      100


 


 02/23/21 06:42   83  13   100


 


 02/23/21 03:55  97.4 F L  87  20  124/88  100








                                        











Admit Weight                   201 lb


 


Weight                         182 lb 12.8 oz

















- Quality Measures


Condition: Atrial Fibrillation/Flutter (hx or current), Heart Failure


CV meds: Beta Blocker: No (on hold d/t hypotension ), ACE/ARB: No (Hypotension 

), ASA: Yes, Anticoagulant: Yes (Eliquis)





- Medication Contraindications


No Beta Blocker reason: Medical contraindication (Hypotension)


No ACE/ARB reason: Medical contraindication


No Anticoagulant reason: Medical contraindication (r/o GI bleed)





- Physical Exam


General: no apparent distress


HEENT: normocephaly


Neck: no JVD/HJR, no bruit


Cardiac: regular rate and rhythm, S1/S2


Lungs: normal breath sounds, no wheeze, rales, rhonchi


Neuro: grossly intact, numbness


Abdomen: active bowel sounds, soft


Extremities: 1+ LE edema, other: (BLE wrapped in ACE bandages)


Skin: clear


Musculoskeletal: no pain





- Labs


Result Diagrams: 


                                 02/20/21 05:28





                                 02/22/21 04:17


                                  Troponin/CKMB











Troponin I  0.051 ng/mL (< 0.028)  H  02/07/21  05:15    














- EKG Interpretation


EKG Method: Telemetry


EKG: sinus rhythm (SR with PACs, HR 60-80's)





- Assessment/Plan


Assessment/Plan: 





1. New-Onset AF: patient is in sinus rhythm today, he is having PAC's his HR is 

maintaining in the  80s 


2. COVID-19 PNA improving, on room air, lungs CTA bilaterally


3. Cardiomyopathy: EF 35-40% 


4. Metastatic Prostate Cancer


5. UTI 


6. Bilateral DVT: Venous duplex revealed bilateral non-occlusive thrombus 

involving the deep venous system of each lower extremity, he has refused an IVC 

filter, he has been placed on PO Eliquis. 


7. GIB - resolved and seen by GI


8. Decubitis Ulcers


9. NSVT: no more episodes of NSVT overnight


10. Bradycardia HR maintaining in the 60-80's per telemetry records.


11. Hypokalemia





Continue current treatment plan supportive care, waiting for placement. 





Pt. seen and eval. by me. I agree wth the A/P b the NP. He continues to say he 

feels fine. I do not believe that he has been out of the bed since being admi

tted. Chest clear. RRR, 1+ LLE. gjm

## 2021-02-24 VITALS — TEMPERATURE: 98.2 F | SYSTOLIC BLOOD PRESSURE: 123 MMHG | DIASTOLIC BLOOD PRESSURE: 72 MMHG

## 2021-02-24 RX ADMIN — Medication SCH MG: at 09:48

## 2021-02-24 RX ADMIN — MOMETASONE FUROATE AND FORMOTEROL FUMARATE DIHYDRATE SCH PUFF: 200; 5 AEROSOL RESPIRATORY (INHALATION) at 06:04

## 2021-02-24 RX ADMIN — MULTIPLE VITAMINS W/ MINERALS TAB SCH TAB: TAB at 09:49

## 2021-02-24 RX ADMIN — Medication SCH ML: at 09:47

## 2021-02-24 NOTE — PDOC.HOSPP
- Subjective


Encounter Date: 02/24/21


Encounter Time: 11:24


Subjective: 


feels fine





- Objective


Vital Signs & Weight: 


                             Vital Signs (12 hours)











  Temp Pulse Resp BP Pulse Ox


 


 02/24/21 09:52  98.1 F  106 H  17  115/70  100


 


 02/24/21 03:40  97.9 F  90  20  115/56 L  100


 


 02/23/21 23:45  98.1 F  84  24 H  114/61  100








                                     Weight











Admit Weight                   201 lb


 


Weight                         182 lb 9.6 oz














I&O: 


                                        











 02/23/21 02/24/21 02/25/21





 06:59 06:59 06:59


 


Intake Total 25 1200 


 


Output Total 450 450 


 


Balance -425 750 











Result Diagrams: 


                                 02/20/21 05:28





                                 02/22/21 04:17





Hospitalist ROS





- Medication


Medications: 


Active Medications











Generic Name Dose Route Start Last Admin





  Trade Name Freq  PRN Reason Stop Dose Admin


 


Acetaminophen  650 mg  01/29/21 03:46  02/19/21 01:15





  Acetaminophen 325 Mg Tab  PO   650 mg





  Q4H PRN   Administration





  Headache/Fever/Mild Pain (1-3)  


 


Apixaban  5 mg  02/09/21 21:00  02/24/21 09:48





  Apixaban 5 Mg Tab  PO   5 mg





  BID AMANDA   Administration


 


Ascorbic Acid  1,000 mg  01/29/21 09:00  02/24/21 09:48





  Ascorbic Acid 500 Mg Chewable Tablet  PO   1,000 mg





  DAILY AMANDA   Administration


 


Aspirin  81 mg  01/29/21 21:00  02/23/21 21:00





  Aspirin Chewable 81 Mg Tab  PO   81 mg





  HS AMANDA   Administration


 


Colchicine  0.6 mg  01/29/21 09:00  02/24/21 09:48





  Colchicine 0.6 Mg Tab  PO   0.6 mg





  BID AMANDA   Administration


 


Cyanocobalamin  1,000 mcg  02/10/21 21:00  02/23/21 20:59





  Cyanocobalamin (Vitamin B-12) 1,000 Mcg Tab  PO   1,000 mcg





  HS AMANDA   Administration


 


Ferrous Sulfate  325 mg  02/11/21 08:00  02/24/21 09:47





  Ferrous Sulfate 325 Mg Tab  PO   325 mg





  QAM-WM AMANDA   Administration


 


Folic Acid  1 mg  02/11/21 09:00  02/24/21 09:48





  Folic Acid 1 Mg Tab  PO   1 mg





  DAILY AMANDA   Administration


 


Furosemide  20 mg  02/11/21 09:00  02/24/21 09:50





  Furosemide 20 Mg Tab  PO   20 mg





  DAILY AMANDA   Administration


 


Guaifenesin/Dextromethorphan  15 ml  01/29/21 03:46  01/29/21 11:37





  Guaifenesin Dm 100-10/5 Ml Udcup  PO   15 ml





  Q4H PRN   Administration





  Cough  


 


Iron/Minerals/Multivitamins  1 tab  02/11/21 09:00  02/24/21 09:49





  Multivitamin W/ Minerals 1 Tab  PO   1 tab





  DAILY AMANDA   Administration


 


Ivabradine  2.5 mg  02/11/21 21:00  02/24/21 09:48





  Ivabradine 5 Mg Tab  PO   2.5 mg





  BID AMANDA   Administration


 


Melatonin  3 mg  02/10/21 08:07  02/19/21 20:57





  Melatonin 3 Mg Tab  PO   3 mg





  HS PRN   Administration





  Insomnia  


 


Midodrine  10 mg  02/17/21 09:00  02/24/21 09:49





  Midodrine Hcl 5 Mg Tab  PO   10 mg





  TID AMANDA   Administration


 


Mometasone Furoate/Formoterol Fumar  2 puff  02/09/21 18:30  02/24/21 06:04





  Mometasone 200 Mcg/Formoterol 5 Mcg 120 Puff Inhaler  INH   2 puff





  BID-RT AMANDA   Administration


 


Mupirocin  1 gm  02/21/21 15:00  02/24/21 09:59





  Mupirocin 2% Ointment 22 Gm Tube  TOP   1 applic





  TID AMANDA   Administration


 


Pantoprazole Sodium  40 mg  02/11/21 09:00  02/24/21 09:50





  Pantoprazole 40 Mg Tab  PO   40 mg





  Q12HR AMANDA   Administration


 


Phenol  0 ml  02/08/21 15:46  02/08/21 16:22





  Chloraseptic Spray 180 Ml Bottle  PO   2 spr





  Q1H PRN   Administration





  SORE THROAT  


 


Potassium Chloride  20 meq  02/20/21 08:00  02/24/21 09:47





  Potassium Chloride 20 Meq Tab  PO   20 meq





  QAM-WM AMANDA   Administration


 


Saccharomyces Boulardii  250 mg  02/11/21 09:00  02/24/21 09:50





  Saccharomyces Boulardii 250 Mg Cap  PO   250 mg





  DAILY AMANDA   Administration


 


Sodium Chloride  10 ml  02/01/21 21:00  02/24/21 09:47





  Flush - Normal Saline 10 Ml Syringe  IVF   10 ml





  Q12HR AMANDA   Administration


 


Sodium Chloride  10 ml  02/01/21 13:30  02/06/21 02:11





  Flush - Normal Saline 10 Ml Syringe  IVF   10 ml





  PRN PRN   Administration





  Saline Flush  


 


Spironolactone  25 mg  02/22/21 17:00  02/24/21 09:47





  Spironolactone 25 Mg Tab  PO   25 mg





  BID-WM AMANDA   Administration


 


Zinc Sulfate  220 mg  01/29/21 09:00  02/24/21 09:50





  Zinc Sulfate 220 Mg Cap  PO   220 mg





  DAILY AMANDA   Administration














Hospitalist Exam


Vitals: 


                             Vital Signs (12 hours)











  Temp Pulse Resp BP Pulse Ox


 


 02/24/21 09:52  98.1 F  106 H  17  115/70  100


 


 02/24/21 03:40  97.9 F  90  20  115/56 L  100


 


 02/23/21 23:45  98.1 F  84  24 H  114/61  100








                                     Weight











Admit Weight                   201 lb


 


Weight                         182 lb 9.6 oz














General Appearance: awake alert


Neck: no JVD


Heart: no murmur, irregular


Respiratory: CTAB


Gastrointestinal: soft, normal bowel sounds


Extremities: no edema





Hosp A/P


(1) DVT (deep venous thrombosis)


Code(s): I82.409 - ACUTE EMBOLISM AND THOMBOS UNSP DEEP VN UNSP LOWER EXTREMITY 

 Status: Acute   


Qualifiers: 


   DVT location: lower extremity   Chronicity: acute   Laterality: bilateral 





(2) Pneumonia due to 2019 novel coronavirus


Code(s): U07.1 - COVID-19; J12.82 - PNEUMONIA DUE TO CORONAVIRUS DISEASE 2019   

Status: Chronic   





(3) Acute respiratory failure due to COVID-19


Code(s): U07.1 - COVID-19; J96.00 - ACUTE RESPIRATORY FAILURE, UNSP W HYPOXIA OR

HYPERCAPNIA   Status: Resolved   





(4) Atrial fibrillation with rapid ventricular response


Code(s): I48.91 - UNSPECIFIED ATRIAL FIBRILLATION   Status: Resolved   





(5) Acute on chronic systolic (congestive) heart failure


Code(s): I50.23 - ACUTE ON CHRONIC SYSTOLIC (CONGESTIVE) HEART FAILURE   Status:

Acute   





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


Code(s): C79.51 - SECONDARY MALIGNANT NEOPLASM OF BONE   Status: Acute   





(7) CKD (chronic kidney disease), stage III


Code(s): N18.3 - CHRONIC KIDNEY DISEASE, STAGE 3 (MODERATE) * DO NOT USE *   

Status: Chronic   


Qualifiers: 


   Chronic kidney disease stage 3 subtype: stage 3a (GFR 45-59)   Qualified 

Code(s): N18.31 - Chronic kidney disease, stage 3a   





(8) HTN (hypertension)


Code(s): I10 - ESSENTIAL (PRIMARY) HYPERTENSION   Status: Chronic   


Qualifiers: 


   Hypertension type: essential hypertension   Qualified Code(s): I10 - 

Essential (primary) hypertension   





(9) Prostate cancer metastatic to multiple sites


Code(s): C61 - MALIGNANT NEOPLASM OF PROSTATE   Status: Chronic   





- Plan


covid :on room air, off meds


atrial fib: rate controoled on current meds


HTN controlled


CAROLE- cpap


awaiting placement

## 2021-02-25 NOTE — DIS
DATE OF ADMISSION:  01/29/2021



DATE OF DISCHARGE:  02/24/2021



DISCHARGED TO:  [facility] under the care of Dr. Gilbert Martínez.



FINAL DIAGNOSES:  COVID pneumonia, acute respiratory failure with hypoxemia, urinary

tract infection, deep vein thrombosis, atrial fibrillation with rapid ventricular

response, gastrointestinal bleed, hypokalemia, acute on chronic systolic heart

failure, acute kidney injury, chronic kidney disease stage 3, hypertension, prostate

cancer, metastatic, non-ST-elevation myocardial infarction/myocardial infarction

type 2. 



DISCHARGE MEDICATIONS:  

1. Flomax 0.4 mg a day.

2. Aspirin 81 mg a day.

3. Prednisone 20 mg a day.

4. Protonix 40 mg q.12 hours, change to once a day after 2 more weeks.

5. Dulera 200/5 two puffs b.i.d. 

6. Corlanor 2.5 mg twice a day.

7. Lasix 20 mg a day.

8. Ferrous sulfate 325 mg a day.

9. Ascorbic acid 1000 mg a day.

10. Eliquis 5 mg twice a day.



CODE STATUS:  Full resuscitation.



DIET:  Heart healthy.



PENDING AT TIME OF DISCHARGE:  Nothing.



HOSPITAL COURSE:  The patient admitted to the hospital through the emergency

department with respiratory distress, positive COVID, hypoxemia.  He had a rapid

atrial fib rate in the 120s.  The admitting diagnoses were COVID-19 infection with

acute respiratory failure and hypoxemia, atrial fibrillation requiring IV

medications for rate control, hypertension, dyslipidemia, metastatic prostate

cancer. 



CONSULTATIONS:  01/29/2021, Dr. Steward, Cardiology; Dr. Calvin Corrigan, Gastroenterology;

Dr. Rambo Muñoz, Pulmonology. 



PROCEDURES:  None. 



The patient was seen in consultation with Dr. Willard Steward, agreed with diltiazem for

rate control.  He was given low-dose Lovenox due to risk of heart failure.  Dr. Calvin Corrigan first saw him on 02/04/21.  The patient has been diagnosed with a DVT and

was on full-dose Lovenox.  Had 3 bloody bowel movements.  He has had no bleeding

before.  His lower extremity ultrasound had been done, revealed a nonocclusive

thrombus in the deep vein system of both lower legs on 02/04. 



Initial laboratory, in addition to the SARS positive was his CBC, 4.4 white count,

9.0 hemoglobin, 213,000 platelet count.  Initial D-dimer which was done on 02/11 was

2.7.  Initial comprehensive metabolic profile revealed only a glucose of 129,

calcium 7.3, and CO2 of 22 as abnormalities.  Cardiac enzymes were troponin 0.034,

0.022. 



On 02/02/21, the patient had a drop in his saturations, had a code green called,

became lethargic.  The patient was placed on BiPAP.  The hospitalist progress note

of that date revealed he was alert, oriented, saturating well on high-flow O2.  At

that time, his significant medications were dexamethasone, he was on dopamine for

blood pressure control, meropenem, colchicine. On 02/07/21, hospitalist progress

note noted that he was still on high-flow O2, dexamethasone.  He had completed a

course of remdesivir.  He is on dopamine per Cardiology, remained in regular sinus

rhythm at that time.  He was noted to have a Pseudomonas UTI, sensitive to meropenem

on 02/08/21.  On 02/10/21, still on high-flow O2.  Chest x-ray consistent with CHF.

BNP was elevated compared to previous.  On 02/17, he was doing well, atrial

fibrillation was controlled.  He had finished recent therapy for his COVID.  He was

on Eliquis for his deep vein thrombosis and atrial fibrillation.  On 02/15, his

white count was 6.7, hemoglobin 9.1, and platelet count 130,000.  His potassium was

2.9, which received replacement therapy.  Sodium 141, BUN 40, creatinine 0.88.  When

I first saw the patient on 02/22, he had no complaints, doing well.  Placement was

pending at that time.  He continued on Eliquis for deep vein thrombosis.  He had

finished treatment of COVID pneumonia, was on room air.  His renal function has

stabilized, chronic kidney disease stage 3.  Echocardiogram revealed an EF of about

40%.  He did not receive ACE or ARBS due to impaired renal function.  During his

hospital stay, he experienced significant bradycardia, did not receive beta-blockers

due to recurrent bradycardia, in fact received medicines to stimulate his heart

beat.  At this time, the patient is in no distress, responsive.  He states he is

feeling fine.  Blood pressure is controlled.  Heart rate is adequate.  Chest is

clear.  He is being sent to [facility] for continuing care under Gilbert Martínez.  He needs to be followed in 3 to 7 days. 







Job ID:  [id number]

## 2021-02-26 NOTE — PQF
CLINICAL DOCUMENTATION CLARIFICATION FORM:    



Dear : Papo Ureña   Date / Time: 02/26/21

Please exercise your independent, professional judgment in responding to the 
clarification form. 

Clinical indicators are provided on the bottom of this form for your review











Please check appropriate box(es):

[  ] Sepsis due to Covid 19 infection  

[  ] Severe Sepsis due to Covid 19 infection        

[  ] Localized infection without sepsis

[  ] Other diagnosis, please specify ___________

[  ] Unable to determine

In addition, please specify:

Present on Admission (POA):  [  ] Yes             [  ] No             [  ] 
Unable to determine



Physician Signature:                         Date/Time:



For continuity of documentation, please document condition throughout progress 
notes and discharge summary.  Thank You.





To be completed by CDI/Coding staff for physician review: 







 Present Clinical Indicators - Signs / Symptoms / Labs Results and Location in 

Medical Record

 

[x] Covid PNA DS 02/24

 

[x] Acute respiratory failure DS 02/24

 

[x] UTI DS 02/24

 

[x] Chest Xray: bilateral nodular infiltrates Chest Xray 01/30

 

[x] Temp=97.9 Respi=24 BP=89/58 VS 01/29

 

[x] Pulse=102 VS 02/03

 

[x] WBC: 02/02=12.6 02/03=11.6 Laboratory 02/02

 

Present Risk Factors                                                            
              Results and Location in Medical Record

 

[x] Covid PNA DS 02/24

 

[x] UTI DS 02/24

 

[x] CHF DS 02/24

 

[x] CKD stage 3 DS 02/24

 

[x] 80 years old male HP 01/29

 

[x] Prostate cancer HP 01/29

 

[x] Decubitus ulcer PN 02/23

 

Present Treatments                                                              
                Results and Location in Medical Record

 

[x] BIPAP DS 02/24

 

[x] Rocephin 1gm IV MAR 

 

[x] Vancomycin 1gm IV MAR 

 

[x] Azithromycin 500mg IV MAR

 

[x] Remdesivir 200mg IV MAR

 

[x] Dopamine 400mg IV MAR

 

[x] IVF MAR

 

[x] Doxycycline 100mg IV MAR





CDS/ Signature: Pete Rojas      Phone #: [phone]    Date/Time: 02/26/21







                 This is a permanent part of the Medical Record

[facility]

## 2021-03-04 ENCOUNTER — HOSPITAL ENCOUNTER (OUTPATIENT)
Dept: HOSPITAL 57 - BURMANOR | Age: 81
Discharge: HOME | End: 2021-03-04
Attending: FAMILY MEDICINE
Payer: MEDICARE

## 2021-03-04 DIAGNOSIS — K92.2: Primary | ICD-10-CM

## 2021-03-04 LAB
ALBUMIN SERPL BCG-MCNC: 2.2 G/DL (ref 3.4–4.8)
ALP SERPL-CCNC: 135 U/L (ref 40–110)
ALT SERPL W P-5'-P-CCNC: 19 U/L (ref 8–55)
ANION GAP SERPL CALC-SCNC: 12 MMOL/L (ref 10–20)
ANISOCYTOSIS BLD QL SMEAR: (no result) (100X)
AST SERPL-CCNC: 11 U/L (ref 5–34)
BASOPHILS # BLD AUTO: 0 THOU/UL (ref 0–0.2)
BASOPHILS NFR BLD AUTO: 0.3 % (ref 0–1)
BILIRUB SERPL-MCNC: 0.2 MG/DL (ref 0.2–1.2)
BUN SERPL-MCNC: 27 MG/DL (ref 8.4–25.7)
CALCIUM SERPL-MCNC: 7.3 MG/DL (ref 7.8–10.44)
CHLORIDE SERPL-SCNC: 110 MMOL/L (ref 98–107)
CO2 SERPL-SCNC: 23 MMOL/L (ref 23–31)
CREAT CL PREDICTED SERPL C-G-VRATE: 0 ML/MIN (ref 70–130)
EOSINOPHIL # BLD AUTO: 0 THOU/UL (ref 0–0.7)
EOSINOPHIL NFR BLD AUTO: 0.1 % (ref 0–10)
GLOBULIN SER CALC-MCNC: 1.9 G/DL (ref 2.4–3.5)
GLUCOSE SERPL-MCNC: 197 MG/DL (ref 83–110)
HGB BLD-MCNC: 9 G/DL (ref 14–18)
LYMPHOCYTES # BLD AUTO: 0.5 THOU/UL (ref 1.2–3.4)
LYMPHOCYTES NFR BLD AUTO: 12.2 % (ref 21–51)
MACROCYTES BLD QL SMEAR: (no result) (100X)
MCH RBC QN AUTO: 33 PG (ref 27–31)
MCV RBC AUTO: 107 FL (ref 78–98)
MDIFF COMPLETE?: YES
MONOCYTES # BLD AUTO: 0.2 THOU/UL (ref 0.11–0.59)
MONOCYTES NFR BLD AUTO: 3.9 % (ref 0–10)
NEUTROPHILS # BLD AUTO: 3.5 THOU/UL (ref 1.4–6.5)
NEUTROPHILS NFR BLD AUTO: 83.6 % (ref 42–75)
OVALOCYTES BLD QL SMEAR: (no result) (100X)
PLATELET # BLD AUTO: 158 THOU/UL (ref 130–400)
POTASSIUM SERPL-SCNC: 4.4 MMOL/L (ref 3.5–5.1)
RBC # BLD AUTO: 2.74 MILL/UL (ref 4.7–6.1)
SODIUM SERPL-SCNC: 141 MMOL/L (ref 136–145)
WBC # BLD AUTO: 4.2 THOU/UL (ref 4.8–10.8)

## 2021-03-04 PROCEDURE — 85025 COMPLETE CBC W/AUTO DIFF WBC: CPT

## 2021-03-04 PROCEDURE — 80053 COMPREHEN METABOLIC PANEL: CPT

## 2021-03-25 ENCOUNTER — HOSPITAL ENCOUNTER (OUTPATIENT)
Dept: HOSPITAL 92 - CSHWCC | Age: 81
Discharge: HOME | End: 2021-03-25
Attending: NURSE PRACTITIONER
Payer: MEDICARE

## 2021-03-25 ENCOUNTER — HOSPITAL ENCOUNTER (INPATIENT)
Dept: HOSPITAL 92 - ERS | Age: 81
LOS: 11 days | Discharge: HOSPICE | DRG: 853 | End: 2021-04-05
Attending: INTERNAL MEDICINE | Admitting: INTERNAL MEDICINE
Payer: MEDICARE

## 2021-03-25 VITALS — BODY MASS INDEX: 3.7 KG/M2

## 2021-03-25 DIAGNOSIS — I50.32: ICD-10-CM

## 2021-03-25 DIAGNOSIS — E78.5: ICD-10-CM
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
## 2021-03-25 DIAGNOSIS — D62: ICD-10-CM

## 2021-03-25 DIAGNOSIS — Z66: ICD-10-CM

## 2021-03-25 DIAGNOSIS — R19.7: ICD-10-CM

## 2021-03-25 DIAGNOSIS — T83.518A: ICD-10-CM

## 2021-03-25 DIAGNOSIS — L89.320: Primary | ICD-10-CM

## 2021-03-25 DIAGNOSIS — Z86.718: ICD-10-CM

## 2021-03-25 DIAGNOSIS — I48.20: ICD-10-CM

## 2021-03-25 DIAGNOSIS — N39.0: ICD-10-CM

## 2021-03-25 DIAGNOSIS — U07.1: ICD-10-CM

## 2021-03-25 DIAGNOSIS — B96.5: ICD-10-CM

## 2021-03-25 DIAGNOSIS — I50.23: ICD-10-CM

## 2021-03-25 DIAGNOSIS — L89.156: ICD-10-CM

## 2021-03-25 DIAGNOSIS — Z90.49: ICD-10-CM

## 2021-03-25 DIAGNOSIS — K92.2: ICD-10-CM

## 2021-03-25 DIAGNOSIS — C61: ICD-10-CM

## 2021-03-25 DIAGNOSIS — L89.154: ICD-10-CM

## 2021-03-25 DIAGNOSIS — A41.81: Primary | ICD-10-CM

## 2021-03-25 DIAGNOSIS — F31.9: ICD-10-CM

## 2021-03-25 DIAGNOSIS — I13.0: ICD-10-CM

## 2021-03-25 DIAGNOSIS — B95.2: ICD-10-CM

## 2021-03-25 DIAGNOSIS — Z51.5: ICD-10-CM

## 2021-03-25 DIAGNOSIS — I25.2: ICD-10-CM

## 2021-03-25 DIAGNOSIS — Z87.442: ICD-10-CM

## 2021-03-25 DIAGNOSIS — C79.51: ICD-10-CM

## 2021-03-25 DIAGNOSIS — I82.4Z3: ICD-10-CM

## 2021-03-25 DIAGNOSIS — Z86.16: ICD-10-CM

## 2021-03-25 DIAGNOSIS — N17.9: ICD-10-CM

## 2021-03-25 DIAGNOSIS — I11.0: ICD-10-CM

## 2021-03-25 DIAGNOSIS — M62.50: ICD-10-CM

## 2021-03-25 DIAGNOSIS — F03.90: ICD-10-CM

## 2021-03-25 DIAGNOSIS — L89.219: ICD-10-CM

## 2021-03-25 DIAGNOSIS — Z74.01: ICD-10-CM

## 2021-03-25 DIAGNOSIS — N40.0: ICD-10-CM

## 2021-03-25 DIAGNOSIS — N18.30: ICD-10-CM

## 2021-03-25 LAB
ALBUMIN SERPL BCG-MCNC: 2 G/DL (ref 3.4–4.8)
ALP SERPL-CCNC: 153 U/L (ref 40–110)
ALT SERPL W P-5'-P-CCNC: 11 U/L (ref 8–55)
ANION GAP SERPL CALC-SCNC: 12 MMOL/L (ref 10–20)
AST SERPL-CCNC: 7 U/L (ref 5–34)
BASOPHILS # BLD AUTO: 0 THOU/UL (ref 0–0.2)
BASOPHILS NFR BLD AUTO: 0.1 % (ref 0–1)
BILIRUB SERPL-MCNC: 0.3 MG/DL (ref 0.2–1.2)
BUN SERPL-MCNC: 34 MG/DL (ref 8.4–25.7)
CALCIUM SERPL-MCNC: 7 MG/DL (ref 7.8–10.44)
CHLORIDE SERPL-SCNC: 110 MMOL/L (ref 98–107)
CO2 SERPL-SCNC: 21 MMOL/L (ref 23–31)
CREAT CL PREDICTED SERPL C-G-VRATE: 0 ML/MIN (ref 70–130)
EOSINOPHIL # BLD AUTO: 0 THOU/UL (ref 0–0.7)
EOSINOPHIL NFR BLD AUTO: 0.1 % (ref 0–10)
GLOBULIN SER CALC-MCNC: 2.3 G/DL (ref 2.4–3.5)
GLUCOSE SERPL-MCNC: 102 MG/DL (ref 83–110)
HGB BLD-MCNC: 6.3 G/DL (ref 14–18)
LEUKOCYTE ESTERASE UR QL STRIP.AUTO: 500 LEU/UL
LYMPHOCYTES # BLD: 0.9 THOU/UL (ref 1.2–3.4)
LYMPHOCYTES NFR BLD AUTO: 10.7 % (ref 21–51)
MCH RBC QN AUTO: 31.5 PG (ref 27–31)
MCV RBC AUTO: 102 FL (ref 78–98)
MONOCYTES # BLD AUTO: 0.3 THOU/UL (ref 0.11–0.59)
MONOCYTES NFR BLD AUTO: 3.4 % (ref 0–10)
NEUTROPHILS # BLD AUTO: 7.3 THOU/UL (ref 1.4–6.5)
NEUTROPHILS NFR BLD AUTO: 85.6 % (ref 42–75)
PLATELET # BLD AUTO: 311 THOU/UL (ref 130–400)
POTASSIUM SERPL-SCNC: 4.3 MMOL/L (ref 3.5–5.1)
PROT UR STRIP.AUTO-MCNC: 20 MG/DL
RBC # BLD AUTO: 2 MILL/UL (ref 4.7–6.1)
RBC UR QL AUTO: (no result) HPF (ref 0–3)
SODIUM SERPL-SCNC: 139 MMOL/L (ref 136–145)
SP GR UR STRIP: 1.02 (ref 1–1.04)
WBC # BLD AUTO: 8.5 THOU/UL (ref 4.8–10.8)

## 2021-03-25 PROCEDURE — 87070 CULTURE OTHR SPECIMN AEROBIC: CPT

## 2021-03-25 PROCEDURE — C9113 INJ PANTOPRAZOLE SODIUM, VIA: HCPCS

## 2021-03-25 PROCEDURE — 81001 URINALYSIS AUTO W/SCOPE: CPT

## 2021-03-25 PROCEDURE — C9132 KCENTRA, PER I.U.: HCPCS

## 2021-03-25 PROCEDURE — 87635 SARS-COV-2 COVID-19 AMP PRB: CPT

## 2021-03-25 PROCEDURE — 93970 EXTREMITY STUDY: CPT

## 2021-03-25 PROCEDURE — 85025 COMPLETE CBC W/AUTO DIFF WBC: CPT

## 2021-03-25 PROCEDURE — 87324 CLOSTRIDIUM AG IA: CPT

## 2021-03-25 PROCEDURE — P9016 RBC LEUKOCYTES REDUCED: HCPCS

## 2021-03-25 PROCEDURE — 87040 BLOOD CULTURE FOR BACTERIA: CPT

## 2021-03-25 PROCEDURE — 86140 C-REACTIVE PROTEIN: CPT

## 2021-03-25 PROCEDURE — 86900 BLOOD TYPING SEROLOGIC ABO: CPT

## 2021-03-25 PROCEDURE — 96367 TX/PROPH/DG ADDL SEQ IV INF: CPT

## 2021-03-25 PROCEDURE — 82274 ASSAY TEST FOR BLOOD FECAL: CPT

## 2021-03-25 PROCEDURE — 80053 COMPREHEN METABOLIC PANEL: CPT

## 2021-03-25 PROCEDURE — 87149 DNA/RNA DIRECT PROBE: CPT

## 2021-03-25 PROCEDURE — 87077 CULTURE AEROBIC IDENTIFY: CPT

## 2021-03-25 PROCEDURE — 83735 ASSAY OF MAGNESIUM: CPT

## 2021-03-25 PROCEDURE — 86901 BLOOD TYPING SEROLOGIC RH(D): CPT

## 2021-03-25 PROCEDURE — 80202 ASSAY OF VANCOMYCIN: CPT

## 2021-03-25 PROCEDURE — C9803 HOPD COVID-19 SPEC COLLECT: HCPCS

## 2021-03-25 PROCEDURE — 80048 BASIC METABOLIC PNL TOTAL CA: CPT

## 2021-03-25 PROCEDURE — 87205 SMEAR GRAM STAIN: CPT

## 2021-03-25 PROCEDURE — 93005 ELECTROCARDIOGRAM TRACING: CPT

## 2021-03-25 PROCEDURE — 81015 MICROSCOPIC EXAM OF URINE: CPT

## 2021-03-25 PROCEDURE — U0005 INFEC AGEN DETEC AMPLI PROBE: HCPCS

## 2021-03-25 PROCEDURE — 36416 COLLJ CAPILLARY BLOOD SPEC: CPT

## 2021-03-25 PROCEDURE — 87086 URINE CULTURE/COLONY COUNT: CPT

## 2021-03-25 PROCEDURE — 87449 NOS EACH ORGANISM AG IA: CPT

## 2021-03-25 PROCEDURE — U0003 INFECTIOUS AGENT DETECTION BY NUCLEIC ACID (DNA OR RNA); SEVERE ACUTE RESPIRATORY SYNDROME CORONAVIRUS 2 (SARS-COV-2) (CORONAVIRUS DISEASE [COVID-19]), AMPLIFIED PROBE TECHNIQUE, MAKING USE OF HIGH THROUGHPUT TECHNOLOGIES AS DESCRIBED BY CMS-2020-01-R: HCPCS

## 2021-03-25 PROCEDURE — 96365 THER/PROPH/DIAG IV INF INIT: CPT

## 2021-03-25 PROCEDURE — 87186 SC STD MICRODIL/AGAR DIL: CPT

## 2021-03-25 PROCEDURE — 83605 ASSAY OF LACTIC ACID: CPT

## 2021-03-25 PROCEDURE — 94664 DEMO&/EVAL PT USE INHALER: CPT

## 2021-03-25 PROCEDURE — 36415 COLL VENOUS BLD VENIPUNCTURE: CPT

## 2021-03-25 PROCEDURE — S0028 INJECTION, FAMOTIDINE, 20 MG: HCPCS

## 2021-03-25 PROCEDURE — 30233N1 TRANSFUSION OF NONAUTOLOGOUS RED BLOOD CELLS INTO PERIPHERAL VEIN, PERCUTANEOUS APPROACH: ICD-10-PCS | Performed by: INTERNAL MEDICINE

## 2021-03-25 PROCEDURE — 36430 TRANSFUSION BLD/BLD COMPNT: CPT

## 2021-03-25 PROCEDURE — 81003 URINALYSIS AUTO W/O SCOPE: CPT

## 2021-03-25 PROCEDURE — 86850 RBC ANTIBODY SCREEN: CPT

## 2021-03-26 LAB
ALBUMIN SERPL BCG-MCNC: 1.8 G/DL (ref 3.4–4.8)
ALP SERPL-CCNC: 147 U/L (ref 40–110)
ALT SERPL W P-5'-P-CCNC: 11 U/L (ref 8–55)
ANION GAP SERPL CALC-SCNC: 14 MMOL/L (ref 10–20)
ANISOCYTOSIS BLD QL SMEAR: (no result) (100X)
AST SERPL-CCNC: 7 U/L (ref 5–34)
BILIRUB SERPL-MCNC: 0.4 MG/DL (ref 0.2–1.2)
BUN SERPL-MCNC: 34 MG/DL (ref 8.4–25.7)
CALCIUM SERPL-MCNC: 6.9 MG/DL (ref 7.8–10.44)
CHLORIDE SERPL-SCNC: 112 MMOL/L (ref 98–107)
CO2 SERPL-SCNC: 16 MMOL/L (ref 23–31)
CREAT CL PREDICTED SERPL C-G-VRATE: 98 ML/MIN (ref 70–130)
GLOBULIN SER CALC-MCNC: 2.3 G/DL (ref 2.4–3.5)
GLUCOSE SERPL-MCNC: 84 MG/DL (ref 83–110)
HGB BLD-MCNC: 7.8 G/DL (ref 14–18)
MCH RBC QN AUTO: 30.8 PG (ref 27–31)
MCV RBC AUTO: 97 FL (ref 78–98)
MDIFF COMPLETE?: YES
PLATELET # BLD AUTO: 181 THOU/UL (ref 130–400)
POLYCHROMASIA BLD QL SMEAR: (no result) (100X)
POTASSIUM SERPL-SCNC: 4.2 MMOL/L (ref 3.5–5.1)
RBC # BLD AUTO: 2.52 MILL/UL (ref 4.7–6.1)
SODIUM SERPL-SCNC: 138 MMOL/L (ref 136–145)
WBC # BLD AUTO: 9.6 THOU/UL (ref 4.8–10.8)

## 2021-03-26 RX ADMIN — CYANOCOBALAMIN TAB 1000 MCG SCH MCG: 1000 TAB at 20:46

## 2021-03-26 RX ADMIN — MOMETASONE FUROATE AND FORMOTEROL FUMARATE DIHYDRATE SCH PUFF: 200; 5 AEROSOL RESPIRATORY (INHALATION) at 19:12

## 2021-03-27 LAB
ALBUMIN SERPL BCG-MCNC: 1.7 G/DL (ref 3.4–4.8)
ALP SERPL-CCNC: 136 U/L (ref 40–110)
ALT SERPL W P-5'-P-CCNC: 10 U/L (ref 8–55)
ANION GAP SERPL CALC-SCNC: 13 MMOL/L (ref 10–20)
ANISOCYTOSIS BLD QL SMEAR: (no result) (100X)
AST SERPL-CCNC: 8 U/L (ref 5–34)
BASOPHILS # BLD AUTO: 0 THOU/UL (ref 0–0.2)
BASOPHILS NFR BLD AUTO: 0.3 % (ref 0–1)
BILIRUB SERPL-MCNC: 0.2 MG/DL (ref 0.2–1.2)
BUN SERPL-MCNC: 32 MG/DL (ref 8.4–25.7)
CALCIUM SERPL-MCNC: 6.7 MG/DL (ref 7.8–10.44)
CHLORIDE SERPL-SCNC: 111 MMOL/L (ref 98–107)
CO2 SERPL-SCNC: 16 MMOL/L (ref 23–31)
CREAT CL PREDICTED SERPL C-G-VRATE: 101 ML/MIN (ref 70–130)
EOSINOPHIL # BLD AUTO: 0.1 THOU/UL (ref 0–0.7)
EOSINOPHIL NFR BLD AUTO: 0.7 % (ref 0–10)
GLOBULIN SER CALC-MCNC: 2.1 G/DL (ref 2.4–3.5)
GLUCOSE SERPL-MCNC: 97 MG/DL (ref 83–110)
HGB BLD-MCNC: 7.2 G/DL (ref 14–18)
LYMPHOCYTES # BLD: 1.2 THOU/UL (ref 1.2–3.4)
LYMPHOCYTES NFR BLD AUTO: 14.7 % (ref 21–51)
MCH RBC QN AUTO: 32 PG (ref 27–31)
MCV RBC AUTO: 97 FL (ref 78–98)
MDIFF COMPLETE?: YES
MONOCYTES # BLD AUTO: 0.3 THOU/UL (ref 0.11–0.59)
MONOCYTES NFR BLD AUTO: 4 % (ref 0–10)
NEUTROPHILS # BLD AUTO: 6.5 THOU/UL (ref 1.4–6.5)
NEUTROPHILS NFR BLD AUTO: 80.4 % (ref 42–75)
PLATELET # BLD AUTO: 240 THOU/UL (ref 130–400)
POLYCHROMASIA BLD QL SMEAR: (no result) (100X)
POTASSIUM SERPL-SCNC: 3.7 MMOL/L (ref 3.5–5.1)
RBC # BLD AUTO: 2.25 MILL/UL (ref 4.7–6.1)
SODIUM SERPL-SCNC: 136 MMOL/L (ref 136–145)
WBC # BLD AUTO: 8.1 THOU/UL (ref 4.8–10.8)

## 2021-03-27 RX ADMIN — MOMETASONE FUROATE AND FORMOTEROL FUMARATE DIHYDRATE SCH PUFF: 200; 5 AEROSOL RESPIRATORY (INHALATION) at 06:39

## 2021-03-27 RX ADMIN — MOMETASONE FUROATE AND FORMOTEROL FUMARATE DIHYDRATE SCH PUFF: 200; 5 AEROSOL RESPIRATORY (INHALATION) at 18:17

## 2021-03-27 RX ADMIN — CYANOCOBALAMIN TAB 1000 MCG SCH MCG: 1000 TAB at 22:08

## 2021-03-27 RX ADMIN — Medication SCH MG: at 10:26

## 2021-03-27 RX ADMIN — VANCOMYCIN SCH MLS: 1.25 INJECTION, SOLUTION INTRAVENOUS at 15:46

## 2021-03-28 LAB
ALBUMIN SERPL BCG-MCNC: 1.8 G/DL (ref 3.4–4.8)
ALP SERPL-CCNC: 148 U/L (ref 40–110)
ALT SERPL W P-5'-P-CCNC: 11 U/L (ref 8–55)
ANION GAP SERPL CALC-SCNC: 14 MMOL/L (ref 10–20)
ANISOCYTOSIS BLD QL SMEAR: (no result) (100X)
AST SERPL-CCNC: 8 U/L (ref 5–34)
BILIRUB SERPL-MCNC: 0.3 MG/DL (ref 0.2–1.2)
BUN SERPL-MCNC: 27 MG/DL (ref 8.4–25.7)
CALCIUM SERPL-MCNC: 6.8 MG/DL (ref 7.8–10.44)
CHLORIDE SERPL-SCNC: 108 MMOL/L (ref 98–107)
CO2 SERPL-SCNC: 16 MMOL/L (ref 23–31)
CREAT CL PREDICTED SERPL C-G-VRATE: 102 ML/MIN (ref 70–130)
GLOBULIN SER CALC-MCNC: 2.3 G/DL (ref 2.4–3.5)
GLUCOSE SERPL-MCNC: 100 MG/DL (ref 83–110)
HGB BLD-MCNC: 9 G/DL (ref 14–18)
MCH RBC QN AUTO: 31.6 PG (ref 27–31)
MCV RBC AUTO: 94.5 FL (ref 78–98)
MDIFF COMPLETE?: YES
PLATELET # BLD AUTO: 269 THOU/UL (ref 130–400)
POLYCHROMASIA BLD QL SMEAR: (no result) (100X)
POTASSIUM SERPL-SCNC: 3.6 MMOL/L (ref 3.5–5.1)
RBC # BLD AUTO: 2.86 MILL/UL (ref 4.7–6.1)
SODIUM SERPL-SCNC: 134 MMOL/L (ref 136–145)
VANCOMYCIN TROUGH SERPL-MCNC: 29.6 UG/ML
WBC # BLD AUTO: 7.5 THOU/UL (ref 4.8–10.8)

## 2021-03-28 PROCEDURE — 0QB10ZZ EXCISION OF SACRUM, OPEN APPROACH: ICD-10-PCS | Performed by: SURGERY

## 2021-03-28 PROCEDURE — 3E033XZ INTRODUCTION OF VASOPRESSOR INTO PERIPHERAL VEIN, PERCUTANEOUS APPROACH: ICD-10-PCS | Performed by: INTERNAL MEDICINE

## 2021-03-28 PROCEDURE — 0JBL0ZZ EXCISION OF RIGHT UPPER LEG SUBCUTANEOUS TISSUE AND FASCIA, OPEN APPROACH: ICD-10-PCS | Performed by: SURGERY

## 2021-03-28 RX ADMIN — VANCOMYCIN SCH MLS: 1.25 INJECTION, SOLUTION INTRAVENOUS at 15:04

## 2021-03-28 RX ADMIN — CYANOCOBALAMIN TAB 1000 MCG SCH MCG: 1000 TAB at 20:50

## 2021-03-28 RX ADMIN — MOMETASONE FUROATE AND FORMOTEROL FUMARATE DIHYDRATE SCH PUFF: 200; 5 AEROSOL RESPIRATORY (INHALATION) at 18:51

## 2021-03-28 RX ADMIN — Medication SCH: at 11:25

## 2021-03-28 RX ADMIN — MOMETASONE FUROATE AND FORMOTEROL FUMARATE DIHYDRATE SCH PUFF: 200; 5 AEROSOL RESPIRATORY (INHALATION) at 07:30

## 2021-03-29 LAB
ALBUMIN SERPL BCG-MCNC: 1.7 G/DL (ref 3.4–4.8)
ALP SERPL-CCNC: 225 U/L (ref 40–110)
ALT SERPL W P-5'-P-CCNC: 11 U/L (ref 8–55)
ANION GAP SERPL CALC-SCNC: 12 MMOL/L (ref 10–20)
AST SERPL-CCNC: 21 U/L (ref 5–34)
BASOPHILS # BLD AUTO: 0 THOU/UL (ref 0–0.2)
BASOPHILS NFR BLD AUTO: 0.4 % (ref 0–1)
BILIRUB SERPL-MCNC: 0.2 MG/DL (ref 0.2–1.2)
BUN SERPL-MCNC: 28 MG/DL (ref 8.4–25.7)
CALCIUM SERPL-MCNC: 6.7 MG/DL (ref 7.8–10.44)
CHLORIDE SERPL-SCNC: 110 MMOL/L (ref 98–107)
CO2 SERPL-SCNC: 17 MMOL/L (ref 23–31)
CREAT CL PREDICTED SERPL C-G-VRATE: 96 ML/MIN (ref 70–130)
EOSINOPHIL # BLD AUTO: 0.1 THOU/UL (ref 0–0.7)
EOSINOPHIL NFR BLD AUTO: 0.8 % (ref 0–10)
GLOBULIN SER CALC-MCNC: 2.2 G/DL (ref 2.4–3.5)
GLUCOSE SERPL-MCNC: 110 MG/DL (ref 83–110)
HGB BLD-MCNC: 9.1 G/DL (ref 14–18)
LYMPHOCYTES # BLD: 1.2 THOU/UL (ref 1.2–3.4)
LYMPHOCYTES NFR BLD AUTO: 12.5 % (ref 21–51)
MCH RBC QN AUTO: 30.4 PG (ref 27–31)
MCV RBC AUTO: 95.3 FL (ref 78–98)
MONOCYTES # BLD AUTO: 0.4 THOU/UL (ref 0.11–0.59)
MONOCYTES NFR BLD AUTO: 4 % (ref 0–10)
NEUTROPHILS # BLD AUTO: 8 THOU/UL (ref 1.4–6.5)
NEUTROPHILS NFR BLD AUTO: 82.3 % (ref 42–75)
PLATELET # BLD AUTO: 296 THOU/UL (ref 130–400)
POTASSIUM SERPL-SCNC: 3.7 MMOL/L (ref 3.5–5.1)
RBC # BLD AUTO: 2.98 MILL/UL (ref 4.7–6.1)
SODIUM SERPL-SCNC: 135 MMOL/L (ref 136–145)
WBC # BLD AUTO: 9.7 THOU/UL (ref 4.8–10.8)

## 2021-03-29 RX ADMIN — MOMETASONE FUROATE AND FORMOTEROL FUMARATE DIHYDRATE SCH PUFF: 200; 5 AEROSOL RESPIRATORY (INHALATION) at 18:49

## 2021-03-29 RX ADMIN — MOMETASONE FUROATE AND FORMOTEROL FUMARATE DIHYDRATE SCH PUFF: 200; 5 AEROSOL RESPIRATORY (INHALATION) at 07:49

## 2021-03-29 RX ADMIN — Medication SCH MG: at 08:40

## 2021-03-29 RX ADMIN — CYANOCOBALAMIN TAB 1000 MCG SCH MCG: 1000 TAB at 20:37

## 2021-03-30 LAB
BACTERIA UR QL AUTO: (no result) HPF
LEUKOCYTE ESTERASE UR QL STRIP.AUTO: 250 LEU/UL
PROT UR STRIP.AUTO-MCNC: 50 MG/DL
RBC UR QL AUTO: (no result) HPF (ref 0–3)
SP GR UR STRIP: 1.02 (ref 1–1.04)
VANCOMYCIN SERPL-MCNC: 31.5 UG/ML
WBC UR QL AUTO: (no result) HPF (ref 0–3)

## 2021-03-30 RX ADMIN — Medication SCH MG: at 09:49

## 2021-03-30 RX ADMIN — MOMETASONE FUROATE AND FORMOTEROL FUMARATE DIHYDRATE SCH PUFF: 200; 5 AEROSOL RESPIRATORY (INHALATION) at 07:38

## 2021-03-30 RX ADMIN — MOMETASONE FUROATE AND FORMOTEROL FUMARATE DIHYDRATE SCH PUFF: 200; 5 AEROSOL RESPIRATORY (INHALATION) at 19:05

## 2021-03-30 RX ADMIN — CYANOCOBALAMIN TAB 1000 MCG SCH MCG: 1000 TAB at 21:23

## 2021-03-31 LAB
ANION GAP SERPL CALC-SCNC: 11 MMOL/L (ref 10–20)
BASOPHILS # BLD AUTO: 0.1 THOU/UL (ref 0–0.2)
BASOPHILS NFR BLD AUTO: 0.6 % (ref 0–1)
BUN SERPL-MCNC: 27 MG/DL (ref 8.4–25.7)
CALCIUM SERPL-MCNC: 6.7 MG/DL (ref 7.8–10.44)
CHLORIDE SERPL-SCNC: 113 MMOL/L (ref 98–107)
CO2 SERPL-SCNC: 17 MMOL/L (ref 23–31)
CREAT CL PREDICTED SERPL C-G-VRATE: 114 ML/MIN (ref 70–130)
CRP SERPL-MCNC: 2.71 MG/DL
EOSINOPHIL # BLD AUTO: 0 THOU/UL (ref 0–0.7)
EOSINOPHIL NFR BLD AUTO: 0.4 % (ref 0–10)
GLUCOSE SERPL-MCNC: 72 MG/DL (ref 83–110)
HGB BLD-MCNC: 8.9 G/DL (ref 14–18)
LYMPHOCYTES # BLD: 1.6 THOU/UL (ref 1.2–3.4)
LYMPHOCYTES NFR BLD AUTO: 15.9 % (ref 21–51)
MAGNESIUM SERPL-MCNC: 2.1 MG/DL (ref 1.6–2.6)
MCH RBC QN AUTO: 29.8 PG (ref 27–31)
MCV RBC AUTO: 95.5 FL (ref 78–98)
MONOCYTES # BLD AUTO: 0.6 THOU/UL (ref 0.11–0.59)
MONOCYTES NFR BLD AUTO: 6 % (ref 0–10)
NEUTROPHILS # BLD AUTO: 7.7 THOU/UL (ref 1.4–6.5)
NEUTROPHILS NFR BLD AUTO: 77.1 % (ref 42–75)
PLATELET # BLD AUTO: 216 THOU/UL (ref 130–400)
POTASSIUM SERPL-SCNC: 3.6 MMOL/L (ref 3.5–5.1)
RBC # BLD AUTO: 2.99 MILL/UL (ref 4.7–6.1)
SODIUM SERPL-SCNC: 137 MMOL/L (ref 136–145)
VANCOMYCIN SERPL-MCNC: 23.7 UG/ML
WBC # BLD AUTO: 10 THOU/UL (ref 4.8–10.8)

## 2021-03-31 RX ADMIN — Medication SCH MG: at 08:23

## 2021-03-31 RX ADMIN — MOMETASONE FUROATE AND FORMOTEROL FUMARATE DIHYDRATE SCH PUFF: 200; 5 AEROSOL RESPIRATORY (INHALATION) at 18:40

## 2021-03-31 RX ADMIN — MOMETASONE FUROATE AND FORMOTEROL FUMARATE DIHYDRATE SCH PUFF: 200; 5 AEROSOL RESPIRATORY (INHALATION) at 07:57

## 2021-03-31 RX ADMIN — CYANOCOBALAMIN TAB 1000 MCG SCH MCG: 1000 TAB at 21:37

## 2021-04-01 LAB
ANION GAP SERPL CALC-SCNC: 11 MMOL/L (ref 10–20)
BUN SERPL-MCNC: 29 MG/DL (ref 8.4–25.7)
CALCIUM SERPL-MCNC: 7 MG/DL (ref 7.8–10.44)
CHLORIDE SERPL-SCNC: 114 MMOL/L (ref 98–107)
CO2 SERPL-SCNC: 18 MMOL/L (ref 23–31)
CREAT CL PREDICTED SERPL C-G-VRATE: 101 ML/MIN (ref 70–130)
GLUCOSE SERPL-MCNC: 83 MG/DL (ref 83–110)
POTASSIUM SERPL-SCNC: 4.1 MMOL/L (ref 3.5–5.1)
SODIUM SERPL-SCNC: 139 MMOL/L (ref 136–145)
VANCOMYCIN SERPL-MCNC: 21 UG/ML

## 2021-04-01 RX ADMIN — MOMETASONE FUROATE AND FORMOTEROL FUMARATE DIHYDRATE SCH PUFF: 200; 5 AEROSOL RESPIRATORY (INHALATION) at 06:55

## 2021-04-01 RX ADMIN — Medication SCH MG: at 09:31

## 2021-04-01 RX ADMIN — CYANOCOBALAMIN TAB 1000 MCG SCH MCG: 1000 TAB at 21:21

## 2021-04-01 RX ADMIN — MOMETASONE FUROATE AND FORMOTEROL FUMARATE DIHYDRATE SCH PUFF: 200; 5 AEROSOL RESPIRATORY (INHALATION) at 19:00

## 2021-04-02 LAB
ANION GAP SERPL CALC-SCNC: 10 MMOL/L (ref 10–20)
BASOPHILS # BLD AUTO: 0.1 THOU/UL (ref 0–0.2)
BASOPHILS NFR BLD AUTO: 0.5 % (ref 0–1)
BUN SERPL-MCNC: 30 MG/DL (ref 8.4–25.7)
CALCIUM SERPL-MCNC: 7 MG/DL (ref 7.8–10.44)
CHLORIDE SERPL-SCNC: 114 MMOL/L (ref 98–107)
CO2 SERPL-SCNC: 17 MMOL/L (ref 23–31)
CREAT CL PREDICTED SERPL C-G-VRATE: 96 ML/MIN (ref 70–130)
EOSINOPHIL # BLD AUTO: 0 THOU/UL (ref 0–0.7)
EOSINOPHIL NFR BLD AUTO: 0.3 % (ref 0–10)
GLUCOSE SERPL-MCNC: 68 MG/DL (ref 83–110)
HGB BLD-MCNC: 10.3 G/DL (ref 14–18)
LYMPHOCYTES # BLD: 1.8 THOU/UL (ref 1.2–3.4)
LYMPHOCYTES NFR BLD AUTO: 15.5 % (ref 21–51)
MCH RBC QN AUTO: 31.1 PG (ref 27–31)
MCV RBC AUTO: 96.9 FL (ref 78–98)
MONOCYTES # BLD AUTO: 0.6 THOU/UL (ref 0.11–0.59)
MONOCYTES NFR BLD AUTO: 5.4 % (ref 0–10)
NEUTROPHILS # BLD AUTO: 9 THOU/UL (ref 1.4–6.5)
NEUTROPHILS NFR BLD AUTO: 78.3 % (ref 42–75)
PLATELET # BLD AUTO: 246 THOU/UL (ref 130–400)
POTASSIUM SERPL-SCNC: 3.9 MMOL/L (ref 3.5–5.1)
RBC # BLD AUTO: 3.32 MILL/UL (ref 4.7–6.1)
SODIUM SERPL-SCNC: 137 MMOL/L (ref 136–145)
WBC # BLD AUTO: 11.5 THOU/UL (ref 4.8–10.8)

## 2021-04-02 RX ADMIN — VANCOMYCIN HYDROCHLORIDE SCH: 1 INJECTION, SOLUTION INTRAVENOUS at 23:39

## 2021-04-02 RX ADMIN — VANCOMYCIN HYDROCHLORIDE SCH: 1 INJECTION, SOLUTION INTRAVENOUS at 08:05

## 2021-04-02 RX ADMIN — Medication SCH MG: at 08:38

## 2021-04-02 RX ADMIN — CYANOCOBALAMIN TAB 1000 MCG SCH MCG: 1000 TAB at 21:10

## 2021-04-02 RX ADMIN — VANCOMYCIN HYDROCHLORIDE SCH MLS: 1 INJECTION, SOLUTION INTRAVENOUS at 00:58

## 2021-04-02 RX ADMIN — MOMETASONE FUROATE AND FORMOTEROL FUMARATE DIHYDRATE SCH PUFF: 200; 5 AEROSOL RESPIRATORY (INHALATION) at 19:39

## 2021-04-02 RX ADMIN — MOMETASONE FUROATE AND FORMOTEROL FUMARATE DIHYDRATE SCH PUFF: 200; 5 AEROSOL RESPIRATORY (INHALATION) at 07:09

## 2021-04-02 RX ADMIN — VANCOMYCIN HYDROCHLORIDE SCH: 1 INJECTION, SOLUTION INTRAVENOUS at 23:27

## 2021-04-02 RX ADMIN — Medication SCH: at 10:05

## 2021-04-03 RX ADMIN — Medication SCH MG: at 07:51

## 2021-04-03 RX ADMIN — MOMETASONE FUROATE AND FORMOTEROL FUMARATE DIHYDRATE SCH: 200; 5 AEROSOL RESPIRATORY (INHALATION) at 08:01

## 2021-04-03 RX ADMIN — MOMETASONE FUROATE AND FORMOTEROL FUMARATE DIHYDRATE SCH PUFF: 200; 5 AEROSOL RESPIRATORY (INHALATION) at 19:01

## 2021-04-03 RX ADMIN — CYANOCOBALAMIN TAB 1000 MCG SCH MCG: 1000 TAB at 21:36

## 2021-04-03 RX ADMIN — VANCOMYCIN HYDROCHLORIDE SCH: 1 INJECTION, SOLUTION INTRAVENOUS at 01:26

## 2021-04-04 RX ADMIN — CYANOCOBALAMIN TAB 1000 MCG SCH: 1000 TAB at 21:58

## 2021-04-04 RX ADMIN — Medication SCH MG: at 09:32

## 2021-04-04 RX ADMIN — Medication SCH: at 11:54

## 2021-04-04 RX ADMIN — MOMETASONE FUROATE AND FORMOTEROL FUMARATE DIHYDRATE SCH: 200; 5 AEROSOL RESPIRATORY (INHALATION) at 07:06

## 2021-04-04 RX ADMIN — MOMETASONE FUROATE AND FORMOTEROL FUMARATE DIHYDRATE SCH PUFF: 200; 5 AEROSOL RESPIRATORY (INHALATION) at 19:05

## 2021-04-04 RX ADMIN — VANCOMYCIN HYDROCHLORIDE SCH MLS: 1 INJECTION, SOLUTION INTRAVENOUS at 00:58

## 2021-04-05 ENCOUNTER — HOSPITAL ENCOUNTER (INPATIENT)
Dept: HOSPITAL 92 - ONC | Age: 81
LOS: 3 days | DRG: 951 | End: 2021-04-08
Payer: COMMERCIAL

## 2021-04-05 VITALS — SYSTOLIC BLOOD PRESSURE: 91 MMHG | TEMPERATURE: 96.1 F | DIASTOLIC BLOOD PRESSURE: 55 MMHG

## 2021-04-05 VITALS — BODY MASS INDEX: 25 KG/M2

## 2021-04-05 DIAGNOSIS — E78.5: ICD-10-CM

## 2021-04-05 DIAGNOSIS — Z86.718: ICD-10-CM

## 2021-04-05 DIAGNOSIS — D64.9: ICD-10-CM

## 2021-04-05 DIAGNOSIS — Z51.5: Primary | ICD-10-CM

## 2021-04-05 DIAGNOSIS — I10: ICD-10-CM

## 2021-04-05 DIAGNOSIS — I25.10: ICD-10-CM

## 2021-04-05 DIAGNOSIS — C61: ICD-10-CM

## 2021-04-05 DIAGNOSIS — C79.51: ICD-10-CM

## 2021-04-05 DIAGNOSIS — I25.2: ICD-10-CM

## 2021-04-05 DIAGNOSIS — L89.154: ICD-10-CM

## 2021-04-05 DIAGNOSIS — I48.20: ICD-10-CM

## 2021-04-05 RX ADMIN — Medication SCH: at 09:01

## 2021-04-05 RX ADMIN — VANCOMYCIN HYDROCHLORIDE SCH MLS: 1 INJECTION, SOLUTION INTRAVENOUS at 00:22

## 2021-04-05 RX ADMIN — MOMETASONE FUROATE AND FORMOTEROL FUMARATE DIHYDRATE SCH: 200; 5 AEROSOL RESPIRATORY (INHALATION) at 07:27

## 2021-04-06 VITALS — SYSTOLIC BLOOD PRESSURE: 87 MMHG | TEMPERATURE: 95.6 F | DIASTOLIC BLOOD PRESSURE: 49 MMHG
